# Patient Record
Sex: MALE | Race: BLACK OR AFRICAN AMERICAN | NOT HISPANIC OR LATINO | Employment: OTHER | URBAN - METROPOLITAN AREA
[De-identification: names, ages, dates, MRNs, and addresses within clinical notes are randomized per-mention and may not be internally consistent; named-entity substitution may affect disease eponyms.]

---

## 2017-02-03 ENCOUNTER — OFFICE VISIT (OUTPATIENT)
Dept: OPTOMETRY | Facility: CLINIC | Age: 82
End: 2017-02-03
Payer: MEDICARE

## 2017-02-03 DIAGNOSIS — H26.9 CORTICAL CATARACT OF BOTH EYES: ICD-10-CM

## 2017-02-03 DIAGNOSIS — H25.13 NS (NUCLEAR SCLEROSIS), BILATERAL: ICD-10-CM

## 2017-02-03 DIAGNOSIS — H52.4 PRESBYOPIA: ICD-10-CM

## 2017-02-03 DIAGNOSIS — H52.203 ASTIGMATISM, BILATERAL: ICD-10-CM

## 2017-02-03 DIAGNOSIS — I10 HTN (HYPERTENSION), BENIGN: Primary | ICD-10-CM

## 2017-02-03 DIAGNOSIS — H35.363 DRUSEN OF MACULA, BILATERAL: ICD-10-CM

## 2017-02-03 PROCEDURE — 99999 PR PBB SHADOW E&M-EST. PATIENT-LVL II: CPT | Mod: PBBFAC,,, | Performed by: OPTOMETRIST

## 2017-02-03 PROCEDURE — 92014 COMPRE OPH EXAM EST PT 1/>: CPT | Mod: S$GLB,,, | Performed by: OPTOMETRIST

## 2017-02-03 PROCEDURE — 99499 UNLISTED E&M SERVICE: CPT | Mod: S$GLB,,, | Performed by: OPTOMETRIST

## 2017-02-03 RX ORDER — SIMVASTATIN 40 MG/1
1 TABLET, FILM COATED ORAL DAILY
COMMUNITY
Start: 2016-12-01 | End: 2020-07-09 | Stop reason: SDUPTHER

## 2017-02-03 NOTE — PROGRESS NOTES
HPI     82 yr old here for a full eye exam; history of HTN.  Mr. Saravia reports   that his BP has been stable and controled with medication.  Glasses wear   full time, no noticed vision changes noticed.  RX is from last exam.    No headache  +diplopia every now and then, states that the images are up and down,   lasting a few seconds.    No itch tear or burn  No eye drops.        Last edited by Doris Maher on 2/3/2017 10:37 AM.         Assessment /Plan     For exam results, see Encounter Report.      HTN (hypertension), benign  No retinopathy, monitor      NS (nuclear sclerosis), bilateral  Cortical cataract of both eyes  Mild, monitor     Presbyopia  Astigmatism, bilateral  Rx specs     Drusen of macula, bilateral  Monitor     RTC 1 year, sooner PRN

## 2019-05-01 ENCOUNTER — OFFICE VISIT (OUTPATIENT)
Dept: OPTOMETRY | Facility: CLINIC | Age: 84
End: 2019-05-01
Payer: MEDICARE

## 2019-05-01 DIAGNOSIS — H25.13 NS (NUCLEAR SCLEROSIS), BILATERAL: Primary | ICD-10-CM

## 2019-05-01 DIAGNOSIS — H52.4 PRESBYOPIA: ICD-10-CM

## 2019-05-01 DIAGNOSIS — I10 HYPERTENSION, UNSPECIFIED TYPE: ICD-10-CM

## 2019-05-01 DIAGNOSIS — H35.363 DRUSEN (DEGENERATIVE) OF MACULA, BILATERAL: ICD-10-CM

## 2019-05-01 PROCEDURE — 92014 COMPRE OPH EXAM EST PT 1/>: CPT | Mod: HCWC,S$GLB,, | Performed by: OPTOMETRIST

## 2019-05-01 PROCEDURE — 92015 DETERMINE REFRACTIVE STATE: CPT | Mod: HCWC,S$GLB,, | Performed by: OPTOMETRIST

## 2019-05-01 PROCEDURE — 92015 PR REFRACTION: ICD-10-PCS | Mod: HCWC,S$GLB,, | Performed by: OPTOMETRIST

## 2019-05-01 PROCEDURE — 99999 PR PBB SHADOW E&M-EST. PATIENT-LVL II: CPT | Mod: PBBFAC,HCWC,, | Performed by: OPTOMETRIST

## 2019-05-01 PROCEDURE — 99999 PR PBB SHADOW E&M-EST. PATIENT-LVL II: ICD-10-PCS | Mod: PBBFAC,HCWC,, | Performed by: OPTOMETRIST

## 2019-05-01 PROCEDURE — 92014 PR EYE EXAM, EST PATIENT,COMPREHESV: ICD-10-PCS | Mod: HCWC,S$GLB,, | Performed by: OPTOMETRIST

## 2019-05-01 RX ORDER — CHOLECALCIFEROL (VITAMIN D3) 25 MCG
1000 TABLET ORAL
COMMUNITY

## 2019-05-01 RX ORDER — OMEGA-3-ACID ETHYL ESTERS 1 G/1
2 CAPSULE, LIQUID FILLED ORAL
COMMUNITY

## 2019-05-02 NOTE — PROGRESS NOTES
HPI     Last eye exam was 2/3/17 with Dr Alvarez.    Patient denies diplopia, headaches, flashes/floaters, pain, and   itching/burning/tearing.    No changes in vision. Says glasses work well.     Using any eye gtts? no        Last edited by Марина Hurt on 5/1/2019 10:54 AM. (History)            Assessment /Plan     For exam results, see Encounter Report.        HTN (hypertension), benign  No retinopathy, monitor      NS (nuclear sclerosis), bilateral  Cortical cataract of both eyes  Mild, monitor     Presbyopia  Astigmatism, bilateral  Rx specs     Drusen of macula, bilateral  Monitor     RTC 1 year, sooner PRN

## 2019-08-02 ENCOUNTER — PES CALL (OUTPATIENT)
Dept: ADMINISTRATIVE | Facility: CLINIC | Age: 84
End: 2019-08-02

## 2019-11-01 DIAGNOSIS — Z86.73 PERSONAL HISTORY OF TRANSIENT CEREBRAL ISCHEMIA: Primary | ICD-10-CM

## 2019-11-04 DIAGNOSIS — Z86.73 HISTORY OF STROKE: Primary | ICD-10-CM

## 2019-11-04 DIAGNOSIS — I63.9 CEREBELLAR INFARCTION: Primary | ICD-10-CM

## 2019-11-04 NOTE — PROGRESS NOTES
Outside order received for 2 D-echo w/ CFD.  Order placed into Epic.  Pt will be contacted to schedule appt.

## 2019-11-05 DIAGNOSIS — Z86.73 HISTORY OF CEREBROVASCULAR ACCIDENT: Primary | ICD-10-CM

## 2019-11-05 DIAGNOSIS — I77.6 VASCULITIS, CNS: ICD-10-CM

## 2019-11-21 ENCOUNTER — HOSPITAL ENCOUNTER (OUTPATIENT)
Dept: RADIOLOGY | Facility: HOSPITAL | Age: 84
Discharge: HOME | End: 2019-11-21
Attending: PSYCHIATRY & NEUROLOGY
Payer: MEDICARE

## 2019-11-21 DIAGNOSIS — I63.9 CEREBELLAR INFARCTION: ICD-10-CM

## 2019-11-21 PROCEDURE — 93886 US TRANSCRAN DOPPLER INTRACRAN ARTR COMP: ICD-10-PCS | Mod: 26,HCWC,, | Performed by: RADIOLOGY

## 2019-11-21 PROCEDURE — 93886 INTRACRANIAL COMPLETE STUDY: CPT | Mod: TC,HCWC

## 2019-11-21 PROCEDURE — 93886 INTRACRANIAL COMPLETE STUDY: CPT | Mod: 26,HCWC,, | Performed by: RADIOLOGY

## 2020-01-28 ENCOUNTER — OFFICE VISIT (OUTPATIENT)
Dept: INTERNAL MEDICINE | Facility: CLINIC | Age: 85
End: 2020-01-28
Payer: MEDICARE

## 2020-01-28 ENCOUNTER — CLINICAL SUPPORT (OUTPATIENT)
Dept: INTERNAL MEDICINE | Facility: CLINIC | Age: 85
End: 2020-01-28
Payer: MEDICARE

## 2020-01-28 VITALS
HEIGHT: 68 IN | HEART RATE: 65 BPM | SYSTOLIC BLOOD PRESSURE: 114 MMHG | BODY MASS INDEX: 23.76 KG/M2 | WEIGHT: 156.75 LBS | OXYGEN SATURATION: 90 % | DIASTOLIC BLOOD PRESSURE: 62 MMHG

## 2020-01-28 DIAGNOSIS — Z86.73 H/O: CVA (CEREBROVASCULAR ACCIDENT): ICD-10-CM

## 2020-01-28 DIAGNOSIS — I69.322 DYSARTHRIA FOLLOWING CEREBRAL INFARCTION: ICD-10-CM

## 2020-01-28 DIAGNOSIS — M81.0 AGE-RELATED OSTEOPOROSIS WITHOUT CURRENT PATHOLOGICAL FRACTURE: ICD-10-CM

## 2020-01-28 DIAGNOSIS — J43.8 OTHER EMPHYSEMA: ICD-10-CM

## 2020-01-28 DIAGNOSIS — N40.0 BENIGN PROSTATIC HYPERPLASIA WITHOUT LOWER URINARY TRACT SYMPTOMS: ICD-10-CM

## 2020-01-28 DIAGNOSIS — Z76.89 ENCOUNTER TO ESTABLISH CARE WITH NEW DOCTOR: Primary | ICD-10-CM

## 2020-01-28 DIAGNOSIS — Z87.891 FORMER TOBACCO USE: ICD-10-CM

## 2020-01-28 DIAGNOSIS — Z86.73 HISTORY OF CVA (CEREBROVASCULAR ACCIDENT): ICD-10-CM

## 2020-01-28 PROBLEM — N18.30 CHRONIC RENAL DISEASE, STAGE III: Status: ACTIVE | Noted: 2019-07-29

## 2020-01-28 PROBLEM — N28.1 ACQUIRED CYST OF KIDNEY: Status: ACTIVE | Noted: 2019-08-05

## 2020-01-28 PROBLEM — I69.391 DYSPHAGIA FOLLOWING CEREBROVASCULAR ACCIDENT: Status: RESOLVED | Noted: 2019-08-29 | Resolved: 2020-01-28

## 2020-01-28 PROBLEM — I12.9 RENAL HYPERTENSION: Status: ACTIVE | Noted: 2019-07-29

## 2020-01-28 PROBLEM — R26.89 GAIT, ANTALGIC: Status: ACTIVE | Noted: 2019-10-03

## 2020-01-28 PROBLEM — K21.9 GASTROESOPHAGEAL REFLUX DISEASE WITHOUT ESOPHAGITIS: Status: ACTIVE | Noted: 2019-08-05

## 2020-01-28 PROBLEM — R80.1 PERSISTENT PROTEINURIA: Status: ACTIVE | Noted: 2019-07-29

## 2020-01-28 PROBLEM — I69.391 DYSPHAGIA FOLLOWING CEREBROVASCULAR ACCIDENT: Status: ACTIVE | Noted: 2019-08-29

## 2020-01-28 PROCEDURE — 99499 UNLISTED E&M SERVICE: CPT | Mod: HCNC,S$GLB,, | Performed by: FAMILY MEDICINE

## 2020-01-28 PROCEDURE — 99387 INIT PM E/M NEW PAT 65+ YRS: CPT | Mod: 25,HCNC,S$GLB, | Performed by: FAMILY MEDICINE

## 2020-01-28 PROCEDURE — 99499 RISK ADDL DX/OHS AUDIT: ICD-10-PCS | Mod: HCNC,S$GLB,, | Performed by: FAMILY MEDICINE

## 2020-01-28 PROCEDURE — 3078F DIAST BP <80 MM HG: CPT | Mod: HCNC,CPTII,S$GLB, | Performed by: FAMILY MEDICINE

## 2020-01-28 PROCEDURE — G0008 FLU VACCINE - HIGH DOSE (65+) PRESERVATIVE FREE IM: ICD-10-PCS | Mod: HCNC,S$GLB,, | Performed by: FAMILY MEDICINE

## 2020-01-28 PROCEDURE — 99999 PR PBB SHADOW E&M-EST. PATIENT-LVL IV: CPT | Mod: PBBFAC,HCNC,, | Performed by: FAMILY MEDICINE

## 2020-01-28 PROCEDURE — 3074F SYST BP LT 130 MM HG: CPT | Mod: HCNC,CPTII,S$GLB, | Performed by: FAMILY MEDICINE

## 2020-01-28 PROCEDURE — G0008 ADMIN INFLUENZA VIRUS VAC: HCPCS | Mod: HCNC,S$GLB,, | Performed by: FAMILY MEDICINE

## 2020-01-28 PROCEDURE — 99387 PR PREVENTIVE VISIT,NEW,65 & OVER: ICD-10-PCS | Mod: 25,HCNC,S$GLB, | Performed by: FAMILY MEDICINE

## 2020-01-28 PROCEDURE — 3074F PR MOST RECENT SYSTOLIC BLOOD PRESSURE < 130 MM HG: ICD-10-PCS | Mod: HCNC,CPTII,S$GLB, | Performed by: FAMILY MEDICINE

## 2020-01-28 PROCEDURE — 99999 PR PBB SHADOW E&M-EST. PATIENT-LVL IV: ICD-10-PCS | Mod: PBBFAC,HCNC,, | Performed by: FAMILY MEDICINE

## 2020-01-28 PROCEDURE — 3078F PR MOST RECENT DIASTOLIC BLOOD PRESSURE < 80 MM HG: ICD-10-PCS | Mod: HCNC,CPTII,S$GLB, | Performed by: FAMILY MEDICINE

## 2020-01-28 PROCEDURE — 90662 IIV NO PRSV INCREASED AG IM: CPT | Mod: HCNC,S$GLB,, | Performed by: FAMILY MEDICINE

## 2020-01-28 PROCEDURE — 90662 FLU VACCINE - HIGH DOSE (65+) PRESERVATIVE FREE IM: ICD-10-PCS | Mod: HCNC,S$GLB,, | Performed by: FAMILY MEDICINE

## 2020-01-28 PROCEDURE — 99999 PR PBB SHADOW E&M-EST. PATIENT-LVL I: CPT | Mod: PBBFAC,HCNC,,

## 2020-01-28 PROCEDURE — 99999 PR PBB SHADOW E&M-EST. PATIENT-LVL I: ICD-10-PCS | Mod: PBBFAC,HCNC,,

## 2020-01-28 RX ORDER — ALENDRONATE SODIUM 70 MG/1
70 TABLET ORAL
COMMUNITY
Start: 2019-08-05 | End: 2021-05-10

## 2020-01-28 NOTE — PROGRESS NOTES
Subjective:       Patient ID: Dwayne Saravia is a 85 y.o. male.    Chief Complaint: Establish Care    HPI    Here with wife (wife is patient of Dr. Carrizales and wanted patient to establish with [facility] MDs)    85yoM with PMHx H/o CVA with residual left sided weakness, aphasia, HTN  to [facility] care. Prior PCP Dr. Go Guallpa    Today: Doing relatively well. No complaints. Quit smoking ~2yrs ago and states that breathing has improved significantly. Dealt with chronic bronchitis prior to quitting. Strength is decreased on left side ever since stroke as well as speech has been affected. Able to speak but just slower. No acute changes. No problems swallowing. Normal urine and bowel habits.    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 No issues with urine since. Requesting records.    #Social:  - Tobacco use--> quit smoking ~2018    Review of Systems   Constitutional: Negative for fatigue and fever.   HENT: Negative for ear pain, sinus pain and sore throat.    Respiratory: Negative for cough and shortness of breath.    Cardiovascular: Negative for chest pain and palpitations.   Gastrointestinal: Negative for abdominal pain, constipation, diarrhea, nausea and vomiting.   Genitourinary: Negative for dysuria and hematuria.   Musculoskeletal: Negative for back pain.   Skin: Negative for rash.   Neurological: Negative for weakness, numbness and headaches.         Past Medical History:   Diagnosis Date    BPH (benign prostatic hyperplasia)     CVA (cerebral vascular accident) 2007    Hypertension         Prior to Admission medications    Medication Sig Start  "Date End Date Taking? Authorizing Provider   ascorbic acid (VITAMIN C) 500 MG tablet Take 500 mg by mouth once daily.    Historical Provider, MD   aspirin 81 MG Chew Take 81 mg by mouth once daily.    Historical Provider, MD   benazepril (LOTENSIN) 20 MG tablet Take 20 mg by mouth once daily.    Historical Provider, MD   calcium-vitamin D 250-100 mg-unit per tablet Take 1 tablet by mouth.    Historical Provider, MD   losartan-hydrochlorothiazide 100-12.5 mg (HYZAAR) 100-12.5 mg Tab Take 1 tablet by mouth once daily.    Historical Provider, MD   metoprolol succinate (TOPROL-XL) 25 MG 24 hr tablet Take 25 mg by mouth once daily.    Historical Provider, MD   nifedipine (PROCARDIA-XL) 60 MG (OSM) 24 hr tablet Take 60 mg by mouth once daily.    Historical Provider, MD   omega-3 acid ethyl esters (LOVAZA) 1 gram capsule Take 2 g by mouth.    Historical Provider, MD   omeprazole (PRILOSEC) 20 MG capsule Take 20 mg by mouth once daily.    Historical Provider, MD   simvastatin (ZOCOR) 40 MG tablet Take 1 tablet by mouth once daily. 12/1/16   Historical Provider, MD   vitamin D (VITAMIN D3) 1000 units Tab Take 1,000 Units by mouth.    Historical Provider, MD        Past medical history, surgical history, and family medical history reviewed and updated as appropriate.    Medications and allergies reviewed.     Objective:          Vitals:    01/28/20 0929   BP: 114/62   BP Location: Right arm   Patient Position: Sitting   BP Method: Large (Manual)   Pulse: 65   SpO2: (!) 90%   Weight: 71.1 kg (156 lb 12 oz)   Height: 5' 8" (1.727 m)     Body mass index is 23.83 kg/m².  Physical Exam   Constitutional: He is oriented to person, place, and time. He appears well-developed and well-nourished. No distress.   Eyes: Pupils are equal, round, and reactive to light. EOM are normal.   Cardiovascular: Normal rate, regular rhythm and normal heart sounds.   No murmur heard.  Pulmonary/Chest: Effort normal and breath sounds normal. No " respiratory distress. He has no wheezes.   Abdominal: Soft. Bowel sounds are normal. He exhibits no distension. There is no tenderness.   Neurological: He is alert and oriented to person, place, and time.   Psychiatric: He has a normal mood and affect. His behavior is normal.       Lab Results   Component Value Date    WBC 5.61 02/02/2011    HGB 12.4 (L) 02/02/2011    HCT 37.0 (L) 02/02/2011     02/02/2011    ALT 10 02/02/2011    AST 14 02/02/2011     02/02/2011    K 4.5 02/02/2011     02/02/2011    CREATININE 1.0 02/02/2011    BUN 16 02/02/2011    CO2 23 02/02/2011    PSA 22 (H) 11/19/2008    INR 1.0 02/01/2011       Assessment:       1. Encounter to establish care with new doctor    2. History of CVA (cerebrovascular accident)    3. Other emphysema    4. Benign prostatic hyperplasia without lower urinary tract symptoms    5. Dysarthria following cerebral infarction    6. H/O: CVA (cerebrovascular accident)    7. Age-related osteoporosis without current pathological fracture        Plan:   Dwayne was seen today for establish care.    Diagnoses and all orders for this visit:    Requesting records today from previous PCP. meds reconciled. Neuro referral placed to get established with [facility] specialist.    Encounter to establish care with new doctor    History of CVA (cerebrovascular accident)  -     Ambulatory Referral to Neurology    Other emphysema  Breathing has improved significantly since quitting smoking ~2yrs ago. Encouraged to continue with cessation.     Benign prostatic hyperplasia without lower urinary tract symptoms  Stable today, no urine issues    Dysarthria following cerebral infarction  No acute changes    H/O: CVA (cerebrovascular accident)    Age-related osteoporosis without current pathological fracture  Cont current regimen.      Health maintenance reviewed with patient.     Follow up in about 4 months (around 5/28/2020).    William Mabry MD  Family Medicine  [facility]  1/28/2020

## 2020-04-27 ENCOUNTER — PATIENT OUTREACH (OUTPATIENT)
Dept: ADMINISTRATIVE | Facility: OTHER | Age: 85
End: 2020-04-27

## 2020-04-28 ENCOUNTER — TELEPHONE (OUTPATIENT)
Dept: NEUROLOGY | Facility: CLINIC | Age: 85
End: 2020-04-28

## 2020-04-28 NOTE — TELEPHONE ENCOUNTER
Moved appt to a later date, mailed appt slip.        ----- Message from Nasra Cummings sent at 4/27/2020 10:54 AM CDT -----  Contact: pt  Pt would like to be called back regarding  Getting his appt r/s    Pt can be reached at [phone]

## 2020-05-28 ENCOUNTER — OFFICE VISIT (OUTPATIENT)
Dept: INTERNAL MEDICINE | Facility: CLINIC | Age: 85
End: 2020-05-28
Payer: MEDICARE

## 2020-05-28 ENCOUNTER — IMMUNIZATION (OUTPATIENT)
Dept: PHARMACY | Facility: CLINIC | Age: 85
End: 2020-05-28
Payer: MEDICARE

## 2020-05-28 VITALS
BODY MASS INDEX: 23.76 KG/M2 | SYSTOLIC BLOOD PRESSURE: 126 MMHG | OXYGEN SATURATION: 94 % | HEART RATE: 72 BPM | WEIGHT: 156.75 LBS | HEIGHT: 68 IN | DIASTOLIC BLOOD PRESSURE: 64 MMHG

## 2020-05-28 DIAGNOSIS — Z86.73 H/O: CVA (CEREBROVASCULAR ACCIDENT): ICD-10-CM

## 2020-05-28 DIAGNOSIS — M81.0 AGE-RELATED OSTEOPOROSIS WITHOUT CURRENT PATHOLOGICAL FRACTURE: ICD-10-CM

## 2020-05-28 DIAGNOSIS — Z87.891 FORMER TOBACCO USE: ICD-10-CM

## 2020-05-28 DIAGNOSIS — J43.8 OTHER EMPHYSEMA: ICD-10-CM

## 2020-05-28 DIAGNOSIS — I12.9 RENAL HYPERTENSION: Primary | ICD-10-CM

## 2020-05-28 PROCEDURE — 3078F DIAST BP <80 MM HG: CPT | Mod: HCNC,CPTII,S$GLB, | Performed by: FAMILY MEDICINE

## 2020-05-28 PROCEDURE — 1159F PR MEDICATION LIST DOCUMENTED IN MEDICAL RECORD: ICD-10-PCS | Mod: HCNC,S$GLB,, | Performed by: FAMILY MEDICINE

## 2020-05-28 PROCEDURE — 3078F PR MOST RECENT DIASTOLIC BLOOD PRESSURE < 80 MM HG: ICD-10-PCS | Mod: HCNC,CPTII,S$GLB, | Performed by: FAMILY MEDICINE

## 2020-05-28 PROCEDURE — 99999 PR PBB SHADOW E&M-EST. PATIENT-LVL III: ICD-10-PCS | Mod: PBBFAC,HCNC,, | Performed by: FAMILY MEDICINE

## 2020-05-28 PROCEDURE — 99214 OFFICE O/P EST MOD 30 MIN: CPT | Mod: HCNC,S$GLB,, | Performed by: FAMILY MEDICINE

## 2020-05-28 PROCEDURE — 1159F MED LIST DOCD IN RCRD: CPT | Mod: HCNC,S$GLB,, | Performed by: FAMILY MEDICINE

## 2020-05-28 PROCEDURE — 1101F PR PT FALLS ASSESS DOC 0-1 FALLS W/OUT INJ PAST YR: ICD-10-PCS | Mod: HCNC,CPTII,S$GLB, | Performed by: FAMILY MEDICINE

## 2020-05-28 PROCEDURE — 1126F PR PAIN SEVERITY QUANTIFIED, NO PAIN PRESENT: ICD-10-PCS | Mod: HCNC,S$GLB,, | Performed by: FAMILY MEDICINE

## 2020-05-28 PROCEDURE — 99214 PR OFFICE/OUTPT VISIT, EST, LEVL IV, 30-39 MIN: ICD-10-PCS | Mod: HCNC,S$GLB,, | Performed by: FAMILY MEDICINE

## 2020-05-28 PROCEDURE — 99999 PR PBB SHADOW E&M-EST. PATIENT-LVL III: CPT | Mod: PBBFAC,HCNC,, | Performed by: FAMILY MEDICINE

## 2020-05-28 PROCEDURE — 1126F AMNT PAIN NOTED NONE PRSNT: CPT | Mod: HCNC,S$GLB,, | Performed by: FAMILY MEDICINE

## 2020-05-28 PROCEDURE — 1101F PT FALLS ASSESS-DOCD LE1/YR: CPT | Mod: HCNC,CPTII,S$GLB, | Performed by: FAMILY MEDICINE

## 2020-05-28 PROCEDURE — 3074F SYST BP LT 130 MM HG: CPT | Mod: HCNC,CPTII,S$GLB, | Performed by: FAMILY MEDICINE

## 2020-05-28 PROCEDURE — 99499 RISK ADDL DX/OHS AUDIT: ICD-10-PCS | Mod: HCNC,S$GLB,, | Performed by: FAMILY MEDICINE

## 2020-05-28 PROCEDURE — 3074F PR MOST RECENT SYSTOLIC BLOOD PRESSURE < 130 MM HG: ICD-10-PCS | Mod: HCNC,CPTII,S$GLB, | Performed by: FAMILY MEDICINE

## 2020-05-28 PROCEDURE — 99499 UNLISTED E&M SERVICE: CPT | Mod: HCNC,S$GLB,, | Performed by: FAMILY MEDICINE

## 2020-05-28 NOTE — PATIENT INSTRUCTIONS
I recommend getting the new shingles vaccine, Shingrix.  This is a 2-dose vaccine that is recommended even for patients who have had the Zostavax shingles vaccine in the past.

## 2020-05-28 NOTE — PROGRESS NOTES
Subjective:       Patient ID: Dwayne Saravia is a 85 y.o. male.    Chief Complaint: Follow-up    HPI    Here with wife (wife is patient of Dr. Carrizales and wanted patient to establish with [facility])     85yoM PMHx H/o CVA with residual left sided weakness, aphasia, HTN for follow up. LOV 1/2020.     Today: Doing relatively well. No complaints. Denies needing refills today. Strength is decreased on left side ever since stroke as well as speech has been affected. Able to speak but just slower. No acute changes. No problems swallowing. Normal urine and bowel habits.     #Cards: HTN, HLD  - current reg: Losartan-HCTZ 100/12.5 qd; Nifedipine 60mg bid; Simvastatin 40mg qd; ASA 81 qd     #Endo: Osteoporosis (dx ?2019)  - current reg: Alendronate 70mg qwk; Vitamin D and Calcium     #Neuro: H/o Mult prior strokes (2007, 2012) with residual lt sided wkness, aphasia  - appt w/ neuro 7/2020 to [facility].      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 No issues with urine since. Requesting records.     #Social:  - Tobacco use--> quit smoking ~2018  - States that breathing has improved significantly. Dealt with chronic bronchitis prior to quitting.     #HM:  - utd pnemococcal vacc per patient report.    Review of Systems   Constitutional: Negative for fatigue and fever.   HENT: Negative for ear pain, sinus pain and sore throat.    Respiratory: Negative for cough and shortness of breath.    Cardiovascular: Negative for chest pain and palpitations.   Gastrointestinal: Negative for abdominal pain, constipation, diarrhea, nausea and vomiting.   Genitourinary: Negative for dysuria and hematuria.   Musculoskeletal: Negative for back pain.   Skin: Negative for rash.   Neurological: Negative for weakness, numbness and headaches.         Past Medical History:   Diagnosis Date    BPH (benign prostatic hyperplasia)     CVA (cerebral vascular accident) 2007    Hypertension         Prior to Admission  "medications    Medication Sig Start Date End Date Taking? Authorizing Provider   alendronate (FOSAMAX) 70 MG tablet Take 70 mg by mouth. 8/5/19 8/4/20  Historical Provider, MD   ascorbic acid (VITAMIN C) 500 MG tablet Take 500 mg by mouth once daily.    Historical Provider, MD   aspirin 81 MG Chew Take 81 mg by mouth once daily.    Historical Provider, MD   calcium-vitamin D 250-100 mg-unit per tablet Take 1 tablet by mouth.    Historical Provider, MD   losartan-hydrochlorothiazide 100-12.5 mg (HYZAAR) 100-12.5 mg Tab Take 1 tablet by mouth once daily.    Historical Provider, MD   nifedipine (PROCARDIA-XL) 60 MG (OSM) 24 hr tablet Take 60 mg by mouth once daily.    Historical Provider, MD   omega-3 acid ethyl esters (LOVAZA) 1 gram capsule Take 2 g by mouth.    Historical Provider, MD   simvastatin (ZOCOR) 40 MG tablet Take 1 tablet by mouth once daily. 12/1/16   Historical Provider, MD   vitamin D (VITAMIN D3) 1000 units Tab Take 1,000 Units by mouth.    Historical Provider, MD        Past medical history, surgical history, and family medical history reviewed and updated as appropriate.    Medications and allergies reviewed.     Objective:          Vitals:    05/28/20 0928   BP: 126/64   BP Location: Right arm   Patient Position: Sitting   BP Method: Medium (Manual)   Pulse: 72   SpO2: (!) 94%   Weight: 71.1 kg (156 lb 12 oz)   Height: 5' 8" (1.727 m)     Body mass index is 23.83 kg/m².  Physical Exam   Constitutional: He is oriented to person, place, and time. He appears well-developed and well-nourished. No distress.   Eyes: EOM are normal.   Cardiovascular: Normal rate, regular rhythm and normal heart sounds.   No murmur heard.  Pulmonary/Chest: Effort normal and breath sounds normal. No respiratory distress. He has no wheezes.   Abdominal: Soft. Bowel sounds are normal. He exhibits no distension. There is no tenderness.   Neurological: He is alert and oriented to person, place, and time.   Psychiatric: He has a " normal mood and affect. His behavior is normal.       Lab Results   Component Value Date    WBC 5.61 02/02/2011    HGB 12.4 (L) 02/02/2011    HCT 37.0 (L) 02/02/2011     02/02/2011    ALT 10 02/02/2011    AST 14 02/02/2011     02/02/2011    K 4.5 02/02/2011     02/02/2011    CREATININE 1.0 02/02/2011    BUN 16 02/02/2011    CO2 23 02/02/2011    PSA 22 (H) 11/19/2008    INR 1.0 02/01/2011       Assessment:       1. Renal hypertension    2. Other emphysema    3. H/O: CVA (cerebrovascular accident)    4. Age-related osteoporosis without current pathological fracture    5. Former tobacco use        Plan:   Dwayne was seen today for follow-up.    Diagnoses and all orders for this visit:    Doing well today, still awaiting records from previous providers, will try requesting again. Will hold off on labs for now but plan to address with labwork next visit regardless of records coming in. Cont to abstain from smoking, cont medications as prescribed. F/u neuro to est care in 1 month    Renal hypertension  at goal.  Continue current medications.    Other emphysema  No acute issues, doing well  H/O: CVA (cerebrovascular accident)  Neuro next month  Age-related osteoporosis without current pathological fracture    Former tobacco use        Health maintenance reviewed with patient. shingrix today    Follow up in about 3 months (around 8/28/2020).    William Mabry MD  Family Medicine  [facility]  5/28/2020

## 2020-07-07 ENCOUNTER — HOSPITAL ENCOUNTER (OUTPATIENT)
Dept: RADIOLOGY | Facility: HOSPITAL | Age: 85
Discharge: HOME | End: 2020-07-07
Attending: NEUROLOGICAL SURGERY
Payer: MEDICARE

## 2020-07-07 ENCOUNTER — OFFICE VISIT (OUTPATIENT)
Dept: NEUROLOGY | Facility: CLINIC | Age: 85
End: 2020-07-07
Payer: MEDICARE

## 2020-07-07 VITALS
BODY MASS INDEX: 23.52 KG/M2 | WEIGHT: 155.19 LBS | DIASTOLIC BLOOD PRESSURE: 78 MMHG | HEART RATE: 73 BPM | SYSTOLIC BLOOD PRESSURE: 127 MMHG | HEIGHT: 68 IN

## 2020-07-07 DIAGNOSIS — Z86.73 H/O: CVA (CEREBROVASCULAR ACCIDENT): Primary | ICD-10-CM

## 2020-07-07 DIAGNOSIS — I69.351 HEMIPARESIS AFFECTING RIGHT SIDE AS LATE EFFECT OF STROKE: ICD-10-CM

## 2020-07-07 DIAGNOSIS — M79.89 FINGER SWELLING: ICD-10-CM

## 2020-07-07 DIAGNOSIS — E78.5 HYPERLIPIDEMIA, UNSPECIFIED HYPERLIPIDEMIA TYPE: ICD-10-CM

## 2020-07-07 DIAGNOSIS — I69.322 DYSARTHRIA FOLLOWING CEREBRAL INFARCTION: ICD-10-CM

## 2020-07-07 PROCEDURE — 3078F DIAST BP <80 MM HG: CPT | Mod: HCNC,CPTII,S$GLB, | Performed by: NEUROLOGICAL SURGERY

## 2020-07-07 PROCEDURE — 99999 PR PBB SHADOW E&M-EST. PATIENT-LVL III: CPT | Mod: PBBFAC,HCNC,, | Performed by: NEUROLOGICAL SURGERY

## 2020-07-07 PROCEDURE — 1125F AMNT PAIN NOTED PAIN PRSNT: CPT | Mod: HCNC,S$GLB,, | Performed by: NEUROLOGICAL SURGERY

## 2020-07-07 PROCEDURE — 1101F PR PT FALLS ASSESS DOC 0-1 FALLS W/OUT INJ PAST YR: ICD-10-PCS | Mod: HCNC,CPTII,S$GLB, | Performed by: NEUROLOGICAL SURGERY

## 2020-07-07 PROCEDURE — 73130 X-RAY EXAM OF HAND: CPT | Mod: TC,HCNC,FY,RT

## 2020-07-07 PROCEDURE — 99204 PR OFFICE/OUTPT VISIT, NEW, LEVL IV, 45-59 MIN: ICD-10-PCS | Mod: HCNC,S$GLB,, | Performed by: NEUROLOGICAL SURGERY

## 2020-07-07 PROCEDURE — 3078F PR MOST RECENT DIASTOLIC BLOOD PRESSURE < 80 MM HG: ICD-10-PCS | Mod: HCNC,CPTII,S$GLB, | Performed by: NEUROLOGICAL SURGERY

## 2020-07-07 PROCEDURE — 3074F SYST BP LT 130 MM HG: CPT | Mod: HCNC,CPTII,S$GLB, | Performed by: NEUROLOGICAL SURGERY

## 2020-07-07 PROCEDURE — 1159F PR MEDICATION LIST DOCUMENTED IN MEDICAL RECORD: ICD-10-PCS | Mod: HCNC,S$GLB,, | Performed by: NEUROLOGICAL SURGERY

## 2020-07-07 PROCEDURE — 3074F PR MOST RECENT SYSTOLIC BLOOD PRESSURE < 130 MM HG: ICD-10-PCS | Mod: HCNC,CPTII,S$GLB, | Performed by: NEUROLOGICAL SURGERY

## 2020-07-07 PROCEDURE — 1159F MED LIST DOCD IN RCRD: CPT | Mod: HCNC,S$GLB,, | Performed by: NEUROLOGICAL SURGERY

## 2020-07-07 PROCEDURE — 99499 RISK ADDL DX/OHS AUDIT: ICD-10-PCS | Mod: S$GLB,,, | Performed by: NEUROLOGICAL SURGERY

## 2020-07-07 PROCEDURE — 99204 OFFICE O/P NEW MOD 45 MIN: CPT | Mod: HCNC,S$GLB,, | Performed by: NEUROLOGICAL SURGERY

## 2020-07-07 PROCEDURE — 99999 PR PBB SHADOW E&M-EST. PATIENT-LVL III: ICD-10-PCS | Mod: PBBFAC,HCNC,, | Performed by: NEUROLOGICAL SURGERY

## 2020-07-07 PROCEDURE — 73130 X-RAY EXAM OF HAND: CPT | Mod: 26,HCNC,RT, | Performed by: RADIOLOGY

## 2020-07-07 PROCEDURE — 1101F PT FALLS ASSESS-DOCD LE1/YR: CPT | Mod: HCNC,CPTII,S$GLB, | Performed by: NEUROLOGICAL SURGERY

## 2020-07-07 PROCEDURE — 99499 UNLISTED E&M SERVICE: CPT | Mod: S$GLB,,, | Performed by: NEUROLOGICAL SURGERY

## 2020-07-07 PROCEDURE — 73130 XR HAND COMPLETE 3 VIEW RIGHT: ICD-10-PCS | Mod: 26,HCNC,RT, | Performed by: RADIOLOGY

## 2020-07-07 PROCEDURE — 1125F PR PAIN SEVERITY QUANTIFIED, PAIN PRESENT: ICD-10-PCS | Mod: HCNC,S$GLB,, | Performed by: NEUROLOGICAL SURGERY

## 2020-07-07 NOTE — LETTER
July 7, 2020      William Mabry MD  [address] [facility] [address]           [facility] [address]  [facility] [address]  Phone: [phone]  Fax: [phone]          Patient: Dwayne Saravia   MR Number: [id number]   YOB: 1934   Date of Visit: 7/7/2020       Dear Dr. William Mabry:    Thank you for referring Dwayne Saravia to me for evaluation. Attached you will find relevant portions of my assessment and plan of care.    If you have questions, please do not hesitate to call me. I look forward to following Dwayne Saravia along with you.    Sincerely,    Manuel Alvarez MD    Enclosure  CC:  No Recipients    If you would like to receive this communication electronically, please contact [email or web address] or [phone] to request more information on [employer] access.    For providers and/or their staff who would like to refer a patient to [facility], please contact us through our one-stop-shop provider referral line, [facility], at [phone].    If you feel you have received this communication in error or would no longer like to receive these types of communications, please e-mail [email or web address]

## 2020-07-07 NOTE — PROGRESS NOTES
Chief Complaint   Patient presents with    Stroke        Dwayne Saravia is a 85 y.o. male with a history of multiple medical diagnoses as listed below that presents for evaluation after having stroke in the past.  The patient is accompanied by his wife who helps to provide the history.  He had stroke in the early s and then had subsequent stroke a few years later.  Since that time he has been having residual left-sided hemiparesis.  He has been having difficulty with his walking in despite being provided assistive devices including a single-point cane, walker, and Rollator the patient says that he has not been using these devices as recommended.  He has had several falls in the last year.  His wife says that she has been encouraging him the use of devices to help with his walking but he has been reluctant to do so.  He has been taking the medication as directed.  Blood pressures been well controlled on his current regimen.  He has no complaints of side effects with his current medication regimen.    PAST MEDICAL HISTORY:  Past Medical History:   Diagnosis Date    BPH (benign prostatic hyperplasia)     CVA (cerebral vascular accident)     Hypertension        PAST SURGICAL HISTORY:  History reviewed. No pertinent surgical history.    SOCIAL HISTORY:  Social History     Socioeconomic History    Marital status:      Spouse name: Not on file    Number of children: Not on file    Years of education: Not on file    Highest education level: Not on file   Occupational History    Not on file   Social Needs    Financial resource strain: Not on file    Food insecurity     Worry: Not on file     Inability: Not on file    Transportation needs     Medical: Not on file     Non-medical: Not on file   Tobacco Use    Smoking status: Former Smoker     Packs/day: 0.25     Start date: 1950     Quit date: 2018     Years since quittin.4    Smokeless tobacco: Never Used   Substance and Sexual  Activity    Alcohol use: No    Drug use: No    Sexual activity: Not on file   Lifestyle    Physical activity     Days per week: Not on file     Minutes per session: Not on file    Stress: Not on file   Relationships    Social connections     Talks on phone: Not on file     Gets together: Not on file     Attends Taoism service: Not on file     Active member of club or organization: Not on file     Attends meetings of clubs or organizations: Not on file     Relationship status: Not on file   Other Topics Concern    Not on file   Social History Narrative    Not on file       FAMILY HISTORY:  Family History   Problem Relation Age of Onset    Hypertension Brother     Cancer Brother        ALLERGIES AND MEDICATIONS: updated and reviewed.  Review of patient's allergies indicates:   Allergen Reactions    Keflex [cephalexin]      Current Outpatient Medications   Medication Sig Dispense Refill    aspirin 81 MG Chew Take 81 mg by mouth once daily.      losartan-hydrochlorothiazide 100-12.5 mg (HYZAAR) 100-12.5 mg Tab Take 1 tablet by mouth once daily.      nifedipine (PROCARDIA-XL) 60 MG (OSM) 24 hr tablet Take 60 mg by mouth once daily.      simvastatin (ZOCOR) 40 MG tablet Take 1 tablet by mouth once daily.      vitamin D (VITAMIN D3) 1000 units Tab Take 1,000 Units by mouth.      alendronate (FOSAMAX) 70 MG tablet Take 70 mg by mouth.      ascorbic acid (VITAMIN C) 500 MG tablet Take 500 mg by mouth once daily.      calcium-vitamin D 250-100 mg-unit per tablet Take 1 tablet by mouth.      omega-3 acid ethyl esters (LOVAZA) 1 gram capsule Take 2 g by mouth.       No current facility-administered medications for this visit.        Review of Systems   Constitutional: Negative for activity change, fatigue and unexpected weight change.   HENT: Negative for trouble swallowing and voice change.    Eyes: Negative for photophobia, pain and visual disturbance.   Respiratory: Negative for apnea and shortness of  breath.    Cardiovascular: Negative for chest pain and palpitations.   Gastrointestinal: Negative for constipation, nausea and vomiting.   Genitourinary: Negative for difficulty urinating.   Musculoskeletal: Negative for arthralgias, back pain, gait problem, myalgias and neck pain.   Skin: Negative for color change and rash.   Neurological: Positive for weakness. Negative for dizziness, seizures, syncope, speech difficulty, light-headedness, numbness and headaches.   Psychiatric/Behavioral: Negative for agitation, behavioral problems and confusion.       Neurologic Exam     Mental Status   Oriented to person, place, and time.   Registration: recalls 3 of 3 objects.   Attention: normal. Concentration: normal.   Speech: speech is normal   Level of consciousness: alert  Knowledge: good.     Cranial Nerves     CN II   Visual fields full to confrontation.   Right visual field deficit: none  Left visual field deficit: none     CN III, IV, VI   Pupils are equal, round, and reactive to light.  Extraocular motions are normal.   Right pupil: Size: 3 mm. Shape: regular. Accommodation: intact.   Left pupil: Size: 3 mm. Shape: regular. Accommodation: intact.   CN III: no CN III palsy  CN VI: no CN VI palsy  Nystagmus: none   Diplopia: none  Ophthalmoparesis: none  Upgaze: normal  Downgaze: normal  Conjugate gaze: present    CN V   Facial sensation intact.   Right facial sensation deficit: none  Left facial sensation deficit: none    CN VII   Facial expression full, symmetric.   Right facial weakness: none  Left facial weakness: none    CN VIII   CN VIII normal.     CN IX, X   CN IX normal.   CN X normal.   Palate: symmetric    CN XI   CN XI normal.   Right sternocleidomastoid strength: normal  Left sternocleidomastoid strength: normal  Right trapezius strength: normal  Left trapezius strength: normal    CN XII   CN XII normal.   Tongue deviation: none    Motor Exam   Muscle bulk: normal  Overall muscle tone: normal  Right arm  tone: normal  Left arm tone: normal  Right leg tone: normal  Left leg tone: normal    Strength   Strength 5/5 except as noted.   Left deltoid: 4/5  Left biceps: 4/5  Left triceps: 4/5  Left quadriceps: 4/5    Sensory Exam   Right arm light touch: normal  Left arm light touch: normal  Right leg light touch: normal  Left leg light touch: normal  Right arm vibration: normal  Left arm vibration: normal  Right leg vibration: normal  Left leg vibration: normal  Right arm proprioception: normal  Left arm proprioception: normal  Right leg proprioception: normal  Left leg proprioception: normal  Right arm pinprick: normal  Left arm pinprick: normal  Right leg pinprick: normal  Left leg pinprick: normal    Gait, Coordination, and Reflexes     Gait  Gait: wide-based    Coordination   Finger to nose coordination: normal  Heel to shin coordination: normal  Tandem walking coordination: normal    Tremor   Resting tremor: absent    Reflexes   Right brachioradialis: 2+  Left brachioradialis: 3+  Right biceps: 2+  Left biceps: 3+  Right triceps: 2+  Left triceps: 3+  Right patellar: 2+  Left patellar: 3+  Right achilles: 2+  Left achilles: 2+  Right plantar: equivocal  Left plantar: equivocal      Physical Exam  Constitutional:       Appearance: He is well-developed.   HENT:      Head: Normocephalic and atraumatic.   Eyes:      Extraocular Movements: EOM normal.      Pupils: Pupils are equal, round, and reactive to light.   Pulmonary:      Effort: Pulmonary effort is normal. No respiratory distress.   Musculoskeletal: Normal range of motion.   Skin:     General: Skin is warm and dry.   Neurological:      Mental Status: He is alert and oriented to person, place, and time.      Coordination: Finger-Nose-Finger Test and Heel to Shin Test normal.      Gait: Tandem walk normal.      Deep Tendon Reflexes:      Reflex Scores:       Tricep reflexes are 2+ on the right side and 3+ on the left side.       Bicep reflexes are 2+ on the right  "side and 3+ on the left side.       Brachioradialis reflexes are 2+ on the right side and 3+ on the left side.       Patellar reflexes are 2+ on the right side and 3+ on the left side.       Achilles reflexes are 2+ on the right side and 2+ on the left side.  Psychiatric:         Speech: Speech normal.         Behavior: Behavior normal.         Vitals:    07/07/20 1041   BP: 127/78   BP Location: Right arm   Patient Position: Sitting   BP Method: Large (Automatic)   Pulse: 73   Weight: 70.4 kg (155 lb 3.3 oz)   Height: 5' 8" (1.727 m)       Assessment & Plan:    Problem List Items Addressed This Visit     H/O: CVA (cerebrovascular accident) - Primary    Overview     3/2008         Relevant Orders    Lipid Panel    Dysarthria following cerebral infarction      Other Visit Diagnoses     Finger swelling        Relevant Orders    X-Ray Hand 3 View Right (Completed)    Hyperlipidemia, unspecified hyperlipidemia type        Relevant Orders    Lipid Panel    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  Follow-up: Follow up in about 3 months (around 10/7/2020).    This note was done with the assistance of voice recognition software. Some errors may be present after proofreading.        "

## 2020-07-09 RX ORDER — SIMVASTATIN 40 MG/1
40 TABLET, FILM COATED ORAL DAILY
Qty: 90 TABLET | Refills: 1 | Status: SHIPPED | OUTPATIENT
Start: 2020-07-09 | End: 2020-11-16

## 2020-07-09 RX ORDER — LOSARTAN POTASSIUM AND HYDROCHLOROTHIAZIDE 12.5; 1 MG/1; MG/1
1 TABLET ORAL DAILY
Qty: 90 TABLET | Refills: 1 | Status: SHIPPED | OUTPATIENT
Start: 2020-07-09 | End: 2020-11-16

## 2020-08-20 ENCOUNTER — OFFICE VISIT (OUTPATIENT)
Dept: INTERNAL MEDICINE | Facility: CLINIC | Age: 85
End: 2020-08-20
Payer: MEDICARE

## 2020-08-20 VITALS
OXYGEN SATURATION: 96 % | HEIGHT: 68 IN | WEIGHT: 156.94 LBS | HEART RATE: 71 BPM | BODY MASS INDEX: 23.79 KG/M2 | SYSTOLIC BLOOD PRESSURE: 110 MMHG | DIASTOLIC BLOOD PRESSURE: 80 MMHG

## 2020-08-20 DIAGNOSIS — N40.0 BENIGN PROSTATIC HYPERPLASIA WITHOUT LOWER URINARY TRACT SYMPTOMS: ICD-10-CM

## 2020-08-20 DIAGNOSIS — Z00.00 ENCOUNTER FOR PREVENTIVE HEALTH EXAMINATION: Primary | ICD-10-CM

## 2020-08-20 DIAGNOSIS — I12.9 RENAL HYPERTENSION: ICD-10-CM

## 2020-08-20 DIAGNOSIS — J43.8 OTHER EMPHYSEMA: ICD-10-CM

## 2020-08-20 DIAGNOSIS — K21.9 GASTROESOPHAGEAL REFLUX DISEASE WITHOUT ESOPHAGITIS: ICD-10-CM

## 2020-08-20 DIAGNOSIS — Z23 NEED FOR SHINGLES VACCINE: ICD-10-CM

## 2020-08-20 DIAGNOSIS — Z23 NEED FOR VACCINATION WITH 13-POLYVALENT PNEUMOCOCCAL CONJUGATE VACCINE: ICD-10-CM

## 2020-08-20 DIAGNOSIS — N18.30 CHRONIC RENAL DISEASE, STAGE III: ICD-10-CM

## 2020-08-20 DIAGNOSIS — I69.322 DYSARTHRIA FOLLOWING CEREBRAL INFARCTION: ICD-10-CM

## 2020-08-20 DIAGNOSIS — Z86.73 H/O: CVA (CEREBROVASCULAR ACCIDENT): ICD-10-CM

## 2020-08-20 DIAGNOSIS — M81.0 AGE-RELATED OSTEOPOROSIS WITHOUT CURRENT PATHOLOGICAL FRACTURE: ICD-10-CM

## 2020-08-20 PROCEDURE — G0009 ADMIN PNEUMOCOCCAL VACCINE: HCPCS | Mod: S$GLB,,, | Performed by: NURSE PRACTITIONER

## 2020-08-20 PROCEDURE — 3074F PR MOST RECENT SYSTOLIC BLOOD PRESSURE < 130 MM HG: ICD-10-PCS | Mod: CPTII,S$GLB,, | Performed by: NURSE PRACTITIONER

## 2020-08-20 PROCEDURE — G0439 PPPS, SUBSEQ VISIT: HCPCS | Mod: S$GLB,,, | Performed by: NURSE PRACTITIONER

## 2020-08-20 PROCEDURE — G0439 PR MEDICARE ANNUAL WELLNESS SUBSEQUENT VISIT: ICD-10-PCS | Mod: S$GLB,,, | Performed by: NURSE PRACTITIONER

## 2020-08-20 PROCEDURE — 90670 PCV13 VACCINE IM: CPT | Mod: S$GLB,,, | Performed by: NURSE PRACTITIONER

## 2020-08-20 PROCEDURE — 3079F DIAST BP 80-89 MM HG: CPT | Mod: CPTII,S$GLB,, | Performed by: NURSE PRACTITIONER

## 2020-08-20 PROCEDURE — 3074F SYST BP LT 130 MM HG: CPT | Mod: CPTII,S$GLB,, | Performed by: NURSE PRACTITIONER

## 2020-08-20 PROCEDURE — G0009 PNEUMOCOCCAL CONJUGATE VACCINE 13-VALENT LESS THAN 5YO & GREATER THAN: ICD-10-PCS | Mod: S$GLB,,, | Performed by: NURSE PRACTITIONER

## 2020-08-20 PROCEDURE — 99999 PR PBB SHADOW E&M-EST. PATIENT-LVL III: ICD-10-PCS | Mod: PBBFAC,,, | Performed by: NURSE PRACTITIONER

## 2020-08-20 PROCEDURE — 90670 PNEUMOCOCCAL CONJUGATE VACCINE 13-VALENT LESS THAN 5YO & GREATER THAN: ICD-10-PCS | Mod: S$GLB,,, | Performed by: NURSE PRACTITIONER

## 2020-08-20 PROCEDURE — 99999 PR PBB SHADOW E&M-EST. PATIENT-LVL III: CPT | Mod: PBBFAC,,, | Performed by: NURSE PRACTITIONER

## 2020-08-20 PROCEDURE — 3079F PR MOST RECENT DIASTOLIC BLOOD PRESSURE 80-89 MM HG: ICD-10-PCS | Mod: CPTII,S$GLB,, | Performed by: NURSE PRACTITIONER

## 2020-08-20 NOTE — PROGRESS NOTES
"  Dwayne Saravia presented for a  Medicare AWV and comprehensive Health Risk Assessment today. The following components were reviewed and updated:    · Medical history  · Family History  · Social history  · Allergies and Current Medications  · Health Risk Assessment  · Health Maintenance  · Care Team     ** See Completed Assessments for Annual Wellness Visit within the encounter summary.**       The following assessments were completed:  · Living Situation  · CAGE  · Depression Screening  · Timed Get Up and Go--not done, walks with a cane  · Whisper Test  · Cognitive Function Screening        · Nutrition Screening  · ADL Screening  · PAQ Screening    Vitals:    08/20/20 0907   BP: 110/80   BP Location: Left arm   Patient Position: Sitting   BP Method: Medium (Manual)   Pulse: 71   SpO2: 96%   Weight: 71.2 kg (156 lb 15.5 oz)   Height: 5' 8" (1.727 m)     Body mass index is 23.87 kg/m².  Physical Exam  Vitals signs and nursing note reviewed.   Constitutional:       Appearance: Normal appearance. He is well-developed.   HENT:      Head: Normocephalic.      Nose: Nose normal.      Mouth/Throat:      Mouth: Mucous membranes are moist.      Pharynx: Oropharynx is clear.   Eyes:      General: Lids are normal. Lids are everted, no foreign bodies appreciated.      Extraocular Movements: Extraocular movements intact.      Conjunctiva/sclera: Conjunctivae normal.      Pupils: Pupils are equal, round, and reactive to light.   Neck:      Musculoskeletal: Full passive range of motion without pain, normal range of motion and neck supple.      Vascular: No carotid bruit or JVD.      Trachea: Trachea normal.   Cardiovascular:      Rate and Rhythm: Normal rate and regular rhythm.      Pulses: Normal pulses.      Heart sounds: Normal heart sounds.   Pulmonary:      Effort: Pulmonary effort is normal.      Breath sounds: Normal breath sounds.   Abdominal:      General: Abdomen is flat. Bowel sounds are normal.      Palpations: " Abdomen is soft.   Musculoskeletal:      Comments: Walks with a cane   Skin:     General: Skin is warm and dry.      Capillary Refill: Capillary refill takes less than 2 seconds.   Neurological:      General: No focal deficit present.      Mental Status: He is alert and oriented to person, place, and time.   Psychiatric:         Mood and Affect: Mood normal.         Speech: Speech normal.         Behavior: Behavior normal.         Thought Content: Thought content normal.         Judgment: Judgment normal.         Diagnoses and health risks identified today and associated recommendations/orders:    1. Encounter for preventive health examination  Exam done    Health Maintenance updated    Records reviewed    2. Other emphysema  Stable, followed by PCP    3. Dysarthria following cerebral infarction  Chronic, followed by PCP    4. H/O: CVA (cerebrovascular accident)  Resolved, stable    5. Renal hypertension  Chronic, followed by PCP    6. Chronic renal disease, stage III  Chronic, followed by PCP    Goal BP < 140/80, LDL goal < 100, low salt diet, avoid otc NSAIDs.    7. Benign prostatic hyperplasia without lower urinary tract symptoms  Stable, followed by PCP    8. Gastroesophageal reflux disease without esophagitis  Stable, followed by PCP    GERD stable on meds, education on dietary triggers done    9. Age-related osteoporosis without current pathological fracture  Chronic, followed by PCP    10. Need for shingles vaccine  - zoster vaccine live, PF, (ZOSTAVAX, PF,) 19,400 unit/0.65 mL injection; Inject 19,400 Units into the skin once. for 1 dose  Dispense: 1 vial; Refill: 0    11. Need for vaccination with 13-polyvalent pneumococcal conjugate vaccine  - (In Office Administered) Pneumococcal Conjugate Vaccine (13 Valent) (IM)    12. BMI 23.0-23.9, adult  BMI reviewed    Provided Dwayne with a 5-10 year written screening schedule and personal prevention plan. Recommendations were developed using the USPSTF age  appropriate recommendations. Education, counseling, and referrals were provided as needed. After Visit Summary printed and given to patient which includes a list of additional screenings\tests needed.    Follow up in about 8 days (around 8/28/2020) for with PCP Dr. Mabry as scheduled.    Janet Arellano, [place]  I offered to discuss end of life issues, including information on how to make advance directives that the patient could use to name someone who would make medical decisions on their behalf if they became too ill to make themselves.    ___Patient declined  _X_Patient is interested, I provided paper work and offered to discuss.

## 2020-08-20 NOTE — PATIENT INSTRUCTIONS
Counseling and Referral of Other Preventative  (Italic type indicates deductible and co-insurance are waived)    Patient Name: Dwayne Saravia  Today's Date: 8/20/2020    Health Maintenance       Date Due Completion Date    Pneumococcal Vaccine (65+ Low/Medium Risk) (2 of 2 - PCV13) 08/29/2015--ordered today 8/29/2014    Shingles Vaccine (3 of 3) 07/23/2020--sent to your pharmacy 5/28/2020    TETANUS VACCINE 08/20/2021 (Originally 11/22/1952) ---Insurance does not cover Tetanus vaccine if given through our clinic, however you may check with your local pharmacy to run vaccine and it may be covered if given there      Influenza Vaccine (1) 09/01/2020 1/28/2020    Aspirin/Antiplatelet Therapy 07/07/2021 7/7/2020    Lipid Panel 07/22/2024 7/22/2019        No orders of the defined types were placed in this encounter.    The following information is provided to all patients.  This information is to help you find resources for any of the problems found today that may be affecting your health:                Living healthy guide: www.[facility].[place].gov      Understanding Diabetes: www.diabetes.org      Eating healthy: www.cdc.gov/healthyweight      CDC home safety checklist: www.cdc.gov/steadi/patient.html      Agency on Aging: www.goea.[place].gov      Alcoholics anonymous (AA): www.aa.org      Physical Activity: www.melissa.nih.gov/fx2zsqx      Tobacco use: www.quitwithusla.org

## 2020-08-28 ENCOUNTER — LAB VISIT (OUTPATIENT)
Dept: LAB | Facility: HOSPITAL | Age: 85
End: 2020-08-28
Attending: FAMILY MEDICINE
Payer: MEDICARE

## 2020-08-28 ENCOUNTER — OFFICE VISIT (OUTPATIENT)
Dept: INTERNAL MEDICINE | Facility: CLINIC | Age: 85
End: 2020-08-28
Payer: MEDICARE

## 2020-08-28 ENCOUNTER — IMMUNIZATION (OUTPATIENT)
Dept: PHARMACY | Facility: CLINIC | Age: 85
End: 2020-08-28
Payer: MEDICARE

## 2020-08-28 VITALS
SYSTOLIC BLOOD PRESSURE: 130 MMHG | OXYGEN SATURATION: 98 % | HEIGHT: 68 IN | BODY MASS INDEX: 23.43 KG/M2 | DIASTOLIC BLOOD PRESSURE: 70 MMHG | HEART RATE: 63 BPM | WEIGHT: 154.56 LBS

## 2020-08-28 DIAGNOSIS — Z86.73 H/O: CVA (CEREBROVASCULAR ACCIDENT): ICD-10-CM

## 2020-08-28 DIAGNOSIS — I12.9 RENAL HYPERTENSION: ICD-10-CM

## 2020-08-28 DIAGNOSIS — N18.30 CHRONIC RENAL DISEASE, STAGE III: ICD-10-CM

## 2020-08-28 DIAGNOSIS — Z87.891 FORMER TOBACCO USE: ICD-10-CM

## 2020-08-28 DIAGNOSIS — M81.0 AGE-RELATED OSTEOPOROSIS WITHOUT CURRENT PATHOLOGICAL FRACTURE: ICD-10-CM

## 2020-08-28 DIAGNOSIS — Z79.899 ENCOUNTER FOR LONG-TERM (CURRENT) USE OF OTHER MEDICATIONS: ICD-10-CM

## 2020-08-28 DIAGNOSIS — G89.29 CHRONIC PAIN OF RIGHT KNEE: ICD-10-CM

## 2020-08-28 DIAGNOSIS — M25.561 CHRONIC PAIN OF RIGHT KNEE: ICD-10-CM

## 2020-08-28 DIAGNOSIS — Z79.899 ENCOUNTER FOR LONG-TERM (CURRENT) USE OF OTHER MEDICATIONS: Primary | ICD-10-CM

## 2020-08-28 LAB
ALBUMIN SERPL BCP-MCNC: 3.8 G/DL (ref 3.5–5.2)
ALP SERPL-CCNC: 90 U/L (ref 55–135)
ALT SERPL W/O P-5'-P-CCNC: 12 U/L (ref 10–44)
ANION GAP SERPL CALC-SCNC: 10 MMOL/L (ref 8–16)
AST SERPL-CCNC: 19 U/L (ref 10–40)
BASOPHILS # BLD AUTO: 0.02 K/UL (ref 0–0.2)
BASOPHILS NFR BLD: 0.4 % (ref 0–1.9)
BILIRUB SERPL-MCNC: 0.4 MG/DL (ref 0.1–1)
BUN SERPL-MCNC: 28 MG/DL (ref 8–23)
BURR CELLS BLD QL SMEAR: ABNORMAL
CALCIUM SERPL-MCNC: 9.4 MG/DL (ref 8.7–10.5)
CHLORIDE SERPL-SCNC: 108 MMOL/L (ref 95–110)
CHOLEST SERPL-MCNC: 116 MG/DL (ref 120–199)
CHOLEST/HDLC SERPL: 2.8 {RATIO} (ref 2–5)
CO2 SERPL-SCNC: 24 MMOL/L (ref 23–29)
CREAT SERPL-MCNC: 1.5 MG/DL (ref 0.5–1.4)
DIFFERENTIAL METHOD: ABNORMAL
EOSINOPHIL # BLD AUTO: 0 K/UL (ref 0–0.5)
EOSINOPHIL NFR BLD: 0.4 % (ref 0–8)
ERYTHROCYTE [DISTWIDTH] IN BLOOD BY AUTOMATED COUNT: 14.2 % (ref 11.5–14.5)
EST. GFR  (AFRICAN AMERICAN): 48.4 ML/MIN/1.73 M^2
EST. GFR  (NON AFRICAN AMERICAN): 41.8 ML/MIN/1.73 M^2
ESTIMATED AVG GLUCOSE: 128 MG/DL (ref 68–131)
GLUCOSE SERPL-MCNC: 86 MG/DL (ref 70–110)
HBA1C MFR BLD HPLC: 6.1 % (ref 4–5.6)
HCT VFR BLD AUTO: 42.8 % (ref 40–54)
HDLC SERPL-MCNC: 41 MG/DL (ref 40–75)
HDLC SERPL: 35.3 % (ref 20–50)
HGB BLD-MCNC: 14.1 G/DL (ref 14–18)
IMM GRANULOCYTES # BLD AUTO: 0.03 K/UL (ref 0–0.04)
IMM GRANULOCYTES NFR BLD AUTO: 0.6 % (ref 0–0.5)
LDLC SERPL CALC-MCNC: 63.2 MG/DL (ref 63–159)
LYMPHOCYTES # BLD AUTO: 1.5 K/UL (ref 1–4.8)
LYMPHOCYTES NFR BLD: 29.8 % (ref 18–48)
MCH RBC QN AUTO: 31.9 PG (ref 27–31)
MCHC RBC AUTO-ENTMCNC: 32.9 G/DL (ref 32–36)
MCV RBC AUTO: 97 FL (ref 82–98)
MONOCYTES # BLD AUTO: 0.6 K/UL (ref 0.3–1)
MONOCYTES NFR BLD: 12.8 % (ref 4–15)
NEUTROPHILS # BLD AUTO: 2.7 K/UL (ref 1.8–7.7)
NEUTROPHILS NFR BLD: 56 % (ref 38–73)
NONHDLC SERPL-MCNC: 75 MG/DL
NRBC BLD-RTO: 0 /100 WBC
OVALOCYTES BLD QL SMEAR: ABNORMAL
PLATELET # BLD AUTO: 264 K/UL (ref 150–350)
PLATELET BLD QL SMEAR: ABNORMAL
PMV BLD AUTO: 10.8 FL (ref 9.2–12.9)
POIKILOCYTOSIS BLD QL SMEAR: SLIGHT
POTASSIUM SERPL-SCNC: 4.4 MMOL/L (ref 3.5–5.1)
PROT SERPL-MCNC: 8 G/DL (ref 6–8.4)
RBC # BLD AUTO: 4.42 M/UL (ref 4.6–6.2)
SODIUM SERPL-SCNC: 142 MMOL/L (ref 136–145)
TRIGL SERPL-MCNC: 59 MG/DL (ref 30–150)
TSH SERPL DL<=0.005 MIU/L-ACNC: 1.35 UIU/ML (ref 0.4–4)
WBC # BLD AUTO: 4.86 K/UL (ref 3.9–12.7)

## 2020-08-28 PROCEDURE — 1159F PR MEDICATION LIST DOCUMENTED IN MEDICAL RECORD: ICD-10-PCS | Mod: S$GLB,,, | Performed by: FAMILY MEDICINE

## 2020-08-28 PROCEDURE — 99999 PR PBB SHADOW E&M-EST. PATIENT-LVL IV: ICD-10-PCS | Mod: PBBFAC,,, | Performed by: FAMILY MEDICINE

## 2020-08-28 PROCEDURE — 3075F PR MOST RECENT SYSTOLIC BLOOD PRESS GE 130-139MM HG: ICD-10-PCS | Mod: CPTII,S$GLB,, | Performed by: FAMILY MEDICINE

## 2020-08-28 PROCEDURE — 85025 COMPLETE CBC W/AUTO DIFF WBC: CPT

## 2020-08-28 PROCEDURE — 1125F PR PAIN SEVERITY QUANTIFIED, PAIN PRESENT: ICD-10-PCS | Mod: S$GLB,,, | Performed by: FAMILY MEDICINE

## 2020-08-28 PROCEDURE — 80053 COMPREHEN METABOLIC PANEL: CPT

## 2020-08-28 PROCEDURE — 83036 HEMOGLOBIN GLYCOSYLATED A1C: CPT

## 2020-08-28 PROCEDURE — 99499 RISK ADDL DX/OHS AUDIT: ICD-10-PCS | Mod: S$GLB,,, | Performed by: FAMILY MEDICINE

## 2020-08-28 PROCEDURE — 1101F PR PT FALLS ASSESS DOC 0-1 FALLS W/OUT INJ PAST YR: ICD-10-PCS | Mod: CPTII,S$GLB,, | Performed by: FAMILY MEDICINE

## 2020-08-28 PROCEDURE — 36415 COLL VENOUS BLD VENIPUNCTURE: CPT

## 2020-08-28 PROCEDURE — 99214 OFFICE O/P EST MOD 30 MIN: CPT | Mod: S$GLB,,, | Performed by: FAMILY MEDICINE

## 2020-08-28 PROCEDURE — 1101F PT FALLS ASSESS-DOCD LE1/YR: CPT | Mod: CPTII,S$GLB,, | Performed by: FAMILY MEDICINE

## 2020-08-28 PROCEDURE — 99999 PR PBB SHADOW E&M-EST. PATIENT-LVL IV: CPT | Mod: PBBFAC,,, | Performed by: FAMILY MEDICINE

## 2020-08-28 PROCEDURE — 3078F DIAST BP <80 MM HG: CPT | Mod: CPTII,S$GLB,, | Performed by: FAMILY MEDICINE

## 2020-08-28 PROCEDURE — 80061 LIPID PANEL: CPT

## 2020-08-28 PROCEDURE — 99499 UNLISTED E&M SERVICE: CPT | Mod: S$GLB,,, | Performed by: FAMILY MEDICINE

## 2020-08-28 PROCEDURE — 1125F AMNT PAIN NOTED PAIN PRSNT: CPT | Mod: S$GLB,,, | Performed by: FAMILY MEDICINE

## 2020-08-28 PROCEDURE — 99214 PR OFFICE/OUTPT VISIT, EST, LEVL IV, 30-39 MIN: ICD-10-PCS | Mod: S$GLB,,, | Performed by: FAMILY MEDICINE

## 2020-08-28 PROCEDURE — 3075F SYST BP GE 130 - 139MM HG: CPT | Mod: CPTII,S$GLB,, | Performed by: FAMILY MEDICINE

## 2020-08-28 PROCEDURE — 3078F PR MOST RECENT DIASTOLIC BLOOD PRESSURE < 80 MM HG: ICD-10-PCS | Mod: CPTII,S$GLB,, | Performed by: FAMILY MEDICINE

## 2020-08-28 PROCEDURE — 1159F MED LIST DOCD IN RCRD: CPT | Mod: S$GLB,,, | Performed by: FAMILY MEDICINE

## 2020-08-28 PROCEDURE — 84443 ASSAY THYROID STIM HORMONE: CPT

## 2020-08-28 RX ORDER — DICLOFENAC SODIUM 10 MG/G
2 GEL TOPICAL DAILY
Qty: 150 G | Refills: 2 | Status: ON HOLD | OUTPATIENT
Start: 2020-08-28 | End: 2023-10-29 | Stop reason: HOSPADM

## 2020-08-28 NOTE — PROGRESS NOTES
Subjective:       Patient ID: Dwayne Saravia is a 85 y.o. male.    Chief Complaint: Follow-up    HPI    Here with wife.     85yoM PMHx H/o CVA with residual left sided weakness, aphasia, HTN for follow up. LOV 5/2020.    HRA visit with QIANA Arellano on 8/20/20     Today: Right knee pain x 4-5 months. Pt reports no swelling. Will feel pain with walking in knee. Non-radiating.     #Cards: HTN  - current reg: Losartan-HCTZ 100/12.5 qd; Nifedipine 60 bid; Simvastatin 40 qd; ASA 81 qd  - well controlled bp today     #Endo: Osteoporosis (dx ?2019)  - current reg: Alendronate 70 qwk; Vitamin D and Calcium     #Neuro: H/o Mult prior strokes (2007, 2012) with residual lt sided wkness, aphasia  - est w/ Dr. Alvarze, lov 7/2020 --> f/u 3m     #Uro: BPH  - pt reports h/o prostate surgery related to BPH with obstruction. No issues with urine since. Requesting records.     #Former tobacco use: quit ~2018  - states that breathing has improved significantly. Dealt with chronic bronchitis prior to quitting.     Review of Systems   Constitutional: Negative for fatigue and fever.   HENT: Negative for ear pain, sinus pain and sore throat.    Respiratory: Negative for cough and shortness of breath.    Cardiovascular: Negative for chest pain and palpitations.   Gastrointestinal: Negative for abdominal pain, blood in stool, constipation, diarrhea, nausea and vomiting.   Genitourinary: Negative for difficulty urinating, dysuria and hematuria.   Musculoskeletal: Positive for arthralgias. Negative for back pain.   Skin: Negative for rash.   Neurological: Positive for weakness (left sided chronic). Negative for numbness and headaches.         Past Medical History:   Diagnosis Date    BPH (benign prostatic hyperplasia)     CVA (cerebral vascular accident) 2007    Hypertension         Prior to Admission medications    Medication Sig Start Date End Date Taking? Authorizing Provider   alendronate (FOSAMAX) 70 MG tablet Take 70 mg by mouth. 8/5/19  "8/4/20  Historical Provider, MD   ascorbic acid (VITAMIN C) 500 MG tablet Take 500 mg by mouth once daily.    Historical Provider, MD   aspirin 81 MG Chew Take 81 mg by mouth once daily.    Historical Provider, MD   calcium-vitamin D 250-100 mg-unit per tablet Take 1 tablet by mouth.    Historical Provider, MD   losartan-hydrochlorothiazide 100-12.5 mg (HYZAAR) 100-12.5 mg Tab Take 1 tablet by mouth once daily. 7/9/20   William Mabry MD   nifedipine (PROCARDIA-XL) 60 MG (OSM) 24 hr tablet Take 60 mg by mouth once daily.    Historical Provider, MD   omega-3 acid ethyl esters (LOVAZA) 1 gram capsule Take 2 g by mouth.    Historical Provider, MD   simvastatin (ZOCOR) 40 MG tablet Take 1 tablet (40 mg total) by mouth once daily. 7/9/20   Wililam Mabry MD   vitamin D (VITAMIN D3) 1000 units Tab Take 1,000 Units by mouth.    Historical Provider, MD        Past medical history, surgical history, and family medical history reviewed and updated as appropriate.    Medications and allergies reviewed.     Objective:          Vitals:    08/28/20 1038   BP: 130/70   Pulse: 63   SpO2: 98%   Weight: 70.1 kg (154 lb 8.7 oz)   Height: 5' 8" (1.727 m)     Body mass index is 23.5 kg/m².  Physical Exam  Vitals signs and nursing note reviewed.   Constitutional:       General: He is not in acute distress.     Appearance: Normal appearance. He is well-developed.      Comments: Walks with cane   Cardiovascular:      Rate and Rhythm: Normal rate and regular rhythm.      Pulses: Normal pulses.      Heart sounds: Normal heart sounds. No murmur.   Pulmonary:      Effort: Pulmonary effort is normal. No respiratory distress.      Breath sounds: Normal breath sounds. No wheezing.   Abdominal:      General: Bowel sounds are normal. There is no distension.      Palpations: Abdomen is soft.      Tenderness: There is no abdominal tenderness.   Musculoskeletal:      Right knee: He exhibits normal range of motion and no swelling.      Comments: " Crepitus right knee   Neurological:      Mental Status: He is alert and oriented to person, place, and time.   Psychiatric:         Mood and Affect: Mood normal.         Behavior: Behavior normal.         Lab Results   Component Value Date    WBC 5.61 02/02/2011    HGB 12.4 (L) 02/02/2011    HCT 37.0 (L) 02/02/2011     02/02/2011    ALT 10 02/02/2011    AST 14 02/02/2011     02/02/2011    K 4.5 02/02/2011     02/02/2011    CREATININE 1.0 02/02/2011    BUN 16 02/02/2011    CO2 23 02/02/2011    PSA 22 (H) 11/19/2008    INR 1.0 02/01/2011       Assessment:       1. Encounter for long-term (current) use of other medications    2. Former tobacco use    3. Age-related osteoporosis without current pathological fracture    4. H/O: CVA (cerebrovascular accident)    5. Renal hypertension    6. Chronic renal disease, stage III    7. Chronic pain of right knee        Plan:   Dwayne was seen today for follow-up.    Diagnoses and all orders for this visit:    Chronic stable conditions addressed today, reviewed prior records from HRA and Neuro appts. Labs today and f/u results.     Pt wants to refrain from xray at this time for knee. Will start topical antiinflammatory for knee.    Encounter for long-term (current) use of other medications  -     Comprehensive metabolic panel; Future  -     CBC auto differential; Future  -     Lipid Panel; Future  -     Hemoglobin A1C; Future  -     TSH; Future    Former tobacco use  Cont to abstain, pt doing well with cessation. Symptoms have improved  Age-related osteoporosis without current pathological fracture    H/O: CVA (cerebrovascular accident)  -     Lipid Panel; Future    Renal hypertension  -     Comprehensive metabolic panel; Future  -     Hemoglobin A1C; Future  -     TSH; Future    Chronic renal disease, stage III  -     Comprehensive metabolic panel; Future  -     CBC auto differential; Future  -     Urinalysis; Future    Chronic pain of right knee  -      diclofenac sodium (VOLTAREN) 1 % Gel; Apply 2 g topically once daily.        Health maintenance reviewed with patient. shingrix today.    Follow up in about 3 months (around 11/28/2020) for Checkup.    William Mabry MD  Family Medicine  [facility]  8/28/2020

## 2021-01-04 ENCOUNTER — TELEPHONE (OUTPATIENT)
Dept: INTERNAL MEDICINE | Facility: CLINIC | Age: 86
End: 2021-01-04

## 2021-01-04 ENCOUNTER — LAB VISIT (OUTPATIENT)
Dept: INTERNAL MEDICINE | Facility: CLINIC | Age: 86
End: 2021-01-04
Payer: MEDICARE

## 2021-01-04 DIAGNOSIS — Z03.818 ENCOUNTER FOR OBSERVATION FOR SUSPECTED EXPOSURE TO OTHER BIOLOGICAL AGENTS RULED OUT: ICD-10-CM

## 2021-01-04 PROCEDURE — U0003 INFECTIOUS AGENT DETECTION BY NUCLEIC ACID (DNA OR RNA); SEVERE ACUTE RESPIRATORY SYNDROME CORONAVIRUS 2 (SARS-COV-2) (CORONAVIRUS DISEASE [COVID-19]), AMPLIFIED PROBE TECHNIQUE, MAKING USE OF HIGH THROUGHPUT TECHNOLOGIES AS DESCRIBED BY CMS-2020-01-R: HCPCS | Mod: HCNC

## 2021-01-05 ENCOUNTER — TELEPHONE (OUTPATIENT)
Dept: INTERNAL MEDICINE | Facility: CLINIC | Age: 86
End: 2021-01-05

## 2021-01-05 LAB — SARS-COV-2 RNA RESP QL NAA+PROBE: NOT DETECTED

## 2021-05-03 ENCOUNTER — PES CALL (OUTPATIENT)
Dept: ADMINISTRATIVE | Facility: CLINIC | Age: 86
End: 2021-05-03

## 2021-05-10 ENCOUNTER — LAB VISIT (OUTPATIENT)
Dept: LAB | Facility: HOSPITAL | Age: 86
End: 2021-05-10
Attending: INTERNAL MEDICINE
Payer: MEDICARE

## 2021-05-10 ENCOUNTER — OFFICE VISIT (OUTPATIENT)
Dept: INTERNAL MEDICINE | Facility: CLINIC | Age: 86
End: 2021-05-10
Payer: MEDICARE

## 2021-05-10 VITALS
SYSTOLIC BLOOD PRESSURE: 138 MMHG | OXYGEN SATURATION: 99 % | DIASTOLIC BLOOD PRESSURE: 70 MMHG | HEART RATE: 50 BPM | WEIGHT: 147.94 LBS | BODY MASS INDEX: 22.42 KG/M2 | HEIGHT: 68 IN

## 2021-05-10 DIAGNOSIS — J43.8 OTHER EMPHYSEMA: ICD-10-CM

## 2021-05-10 DIAGNOSIS — R26.89 GAIT, ANTALGIC: ICD-10-CM

## 2021-05-10 DIAGNOSIS — N18.30 STAGE 3 CHRONIC KIDNEY DISEASE, UNSPECIFIED WHETHER STAGE 3A OR 3B CKD: ICD-10-CM

## 2021-05-10 DIAGNOSIS — Z87.891 FORMER TOBACCO USE: ICD-10-CM

## 2021-05-10 DIAGNOSIS — I10 ESSENTIAL HYPERTENSION: Primary | ICD-10-CM

## 2021-05-10 DIAGNOSIS — I69.351 HEMIPARESIS AFFECTING RIGHT SIDE AS LATE EFFECT OF STROKE: ICD-10-CM

## 2021-05-10 DIAGNOSIS — I10 ESSENTIAL HYPERTENSION: ICD-10-CM

## 2021-05-10 LAB
ALBUMIN SERPL BCP-MCNC: 3.4 G/DL (ref 3.5–5.2)
ALP SERPL-CCNC: 85 U/L (ref 55–135)
ALT SERPL W/O P-5'-P-CCNC: 10 U/L (ref 10–44)
ANION GAP SERPL CALC-SCNC: 6 MMOL/L (ref 8–16)
AST SERPL-CCNC: 19 U/L (ref 10–40)
BILIRUB SERPL-MCNC: 0.6 MG/DL (ref 0.1–1)
BUN SERPL-MCNC: 19 MG/DL (ref 8–23)
CALCIUM SERPL-MCNC: 9.2 MG/DL (ref 8.7–10.5)
CHLORIDE SERPL-SCNC: 105 MMOL/L (ref 95–110)
CHOLEST SERPL-MCNC: 122 MG/DL (ref 120–199)
CHOLEST/HDLC SERPL: 3 {RATIO} (ref 2–5)
CO2 SERPL-SCNC: 27 MMOL/L (ref 23–29)
CREAT SERPL-MCNC: 1.3 MG/DL (ref 0.5–1.4)
EST. GFR  (AFRICAN AMERICAN): 57.1 ML/MIN/1.73 M^2
EST. GFR  (NON AFRICAN AMERICAN): 49.4 ML/MIN/1.73 M^2
GLUCOSE SERPL-MCNC: 95 MG/DL (ref 70–110)
HDLC SERPL-MCNC: 41 MG/DL (ref 40–75)
HDLC SERPL: 33.6 % (ref 20–50)
LDLC SERPL CALC-MCNC: 61.6 MG/DL (ref 63–159)
NONHDLC SERPL-MCNC: 81 MG/DL
POTASSIUM SERPL-SCNC: 4.5 MMOL/L (ref 3.5–5.1)
PROT SERPL-MCNC: 7.4 G/DL (ref 6–8.4)
SODIUM SERPL-SCNC: 138 MMOL/L (ref 136–145)
TRIGL SERPL-MCNC: 97 MG/DL (ref 30–150)

## 2021-05-10 PROCEDURE — 99214 PR OFFICE/OUTPT VISIT, EST, LEVL IV, 30-39 MIN: ICD-10-PCS | Mod: S$GLB,,, | Performed by: INTERNAL MEDICINE

## 2021-05-10 PROCEDURE — 1125F PR PAIN SEVERITY QUANTIFIED, PAIN PRESENT: ICD-10-PCS | Mod: S$GLB,,, | Performed by: INTERNAL MEDICINE

## 2021-05-10 PROCEDURE — 1101F PR PT FALLS ASSESS DOC 0-1 FALLS W/OUT INJ PAST YR: ICD-10-PCS | Mod: CPTII,S$GLB,, | Performed by: INTERNAL MEDICINE

## 2021-05-10 PROCEDURE — 99214 OFFICE O/P EST MOD 30 MIN: CPT | Mod: S$GLB,,, | Performed by: INTERNAL MEDICINE

## 2021-05-10 PROCEDURE — 99999 PR PBB SHADOW E&M-EST. PATIENT-LVL III: CPT | Mod: PBBFAC,,, | Performed by: INTERNAL MEDICINE

## 2021-05-10 PROCEDURE — 36415 COLL VENOUS BLD VENIPUNCTURE: CPT | Performed by: INTERNAL MEDICINE

## 2021-05-10 PROCEDURE — 1159F PR MEDICATION LIST DOCUMENTED IN MEDICAL RECORD: ICD-10-PCS | Mod: S$GLB,,, | Performed by: INTERNAL MEDICINE

## 2021-05-10 PROCEDURE — 99499 RISK ADDL DX/OHS AUDIT: ICD-10-PCS | Mod: S$GLB,,, | Performed by: INTERNAL MEDICINE

## 2021-05-10 PROCEDURE — 80053 COMPREHEN METABOLIC PANEL: CPT | Performed by: INTERNAL MEDICINE

## 2021-05-10 PROCEDURE — 80061 LIPID PANEL: CPT | Performed by: INTERNAL MEDICINE

## 2021-05-10 PROCEDURE — 99499 UNLISTED E&M SERVICE: CPT | Mod: S$GLB,,, | Performed by: INTERNAL MEDICINE

## 2021-05-10 PROCEDURE — 1159F MED LIST DOCD IN RCRD: CPT | Mod: S$GLB,,, | Performed by: INTERNAL MEDICINE

## 2021-05-10 PROCEDURE — 1101F PT FALLS ASSESS-DOCD LE1/YR: CPT | Mod: CPTII,S$GLB,, | Performed by: INTERNAL MEDICINE

## 2021-05-10 PROCEDURE — 3288F PR FALLS RISK ASSESSMENT DOCUMENTED: ICD-10-PCS | Mod: CPTII,S$GLB,, | Performed by: INTERNAL MEDICINE

## 2021-05-10 PROCEDURE — 3288F FALL RISK ASSESSMENT DOCD: CPT | Mod: CPTII,S$GLB,, | Performed by: INTERNAL MEDICINE

## 2021-05-10 PROCEDURE — 99999 PR PBB SHADOW E&M-EST. PATIENT-LVL III: ICD-10-PCS | Mod: PBBFAC,,, | Performed by: INTERNAL MEDICINE

## 2021-05-10 PROCEDURE — 1125F AMNT PAIN NOTED PAIN PRSNT: CPT | Mod: S$GLB,,, | Performed by: INTERNAL MEDICINE

## 2021-05-27 ENCOUNTER — TELEPHONE (OUTPATIENT)
Dept: INTERNAL MEDICINE | Facility: CLINIC | Age: 86
End: 2021-05-27

## 2021-05-28 ENCOUNTER — TELEPHONE (OUTPATIENT)
Dept: INTERNAL MEDICINE | Facility: CLINIC | Age: 86
End: 2021-05-28

## 2021-05-28 ENCOUNTER — OFFICE VISIT (OUTPATIENT)
Dept: INTERNAL MEDICINE | Facility: CLINIC | Age: 86
End: 2021-05-28
Payer: MEDICARE

## 2021-05-28 VITALS
HEIGHT: 68 IN | WEIGHT: 154.56 LBS | BODY MASS INDEX: 23.43 KG/M2 | DIASTOLIC BLOOD PRESSURE: 78 MMHG | HEART RATE: 56 BPM | SYSTOLIC BLOOD PRESSURE: 124 MMHG | OXYGEN SATURATION: 95 %

## 2021-05-28 DIAGNOSIS — M81.0 AGE-RELATED OSTEOPOROSIS WITHOUT CURRENT PATHOLOGICAL FRACTURE: ICD-10-CM

## 2021-05-28 DIAGNOSIS — Z00.00 ENCOUNTER FOR PREVENTIVE HEALTH EXAMINATION: Primary | ICD-10-CM

## 2021-05-28 DIAGNOSIS — K21.9 GASTROESOPHAGEAL REFLUX DISEASE WITHOUT ESOPHAGITIS: ICD-10-CM

## 2021-05-28 DIAGNOSIS — Z87.891 FORMER TOBACCO USE: ICD-10-CM

## 2021-05-28 DIAGNOSIS — I69.322 DYSARTHRIA FOLLOWING CEREBRAL INFARCTION: ICD-10-CM

## 2021-05-28 DIAGNOSIS — Z86.73 H/O: CVA (CEREBROVASCULAR ACCIDENT): ICD-10-CM

## 2021-05-28 DIAGNOSIS — N18.30 STAGE 3 CHRONIC KIDNEY DISEASE, UNSPECIFIED WHETHER STAGE 3A OR 3B CKD: ICD-10-CM

## 2021-05-28 DIAGNOSIS — I69.351 HEMIPARESIS AFFECTING RIGHT SIDE AS LATE EFFECT OF STROKE: ICD-10-CM

## 2021-05-28 DIAGNOSIS — N40.0 BENIGN PROSTATIC HYPERPLASIA WITHOUT LOWER URINARY TRACT SYMPTOMS: ICD-10-CM

## 2021-05-28 DIAGNOSIS — R26.9 ABNORMALITY OF GAIT AND MOBILITY: ICD-10-CM

## 2021-05-28 DIAGNOSIS — Z99.89 DEPENDENCE ON OTHER ENABLING MACHINES AND DEVICES: ICD-10-CM

## 2021-05-28 DIAGNOSIS — R26.89 GAIT, ANTALGIC: ICD-10-CM

## 2021-05-28 PROCEDURE — 1101F PT FALLS ASSESS-DOCD LE1/YR: CPT | Mod: CPTII,S$GLB,, | Performed by: NURSE PRACTITIONER

## 2021-05-28 PROCEDURE — 3288F FALL RISK ASSESSMENT DOCD: CPT | Mod: CPTII,S$GLB,, | Performed by: NURSE PRACTITIONER

## 2021-05-28 PROCEDURE — 3288F PR FALLS RISK ASSESSMENT DOCUMENTED: ICD-10-PCS | Mod: CPTII,S$GLB,, | Performed by: NURSE PRACTITIONER

## 2021-05-28 PROCEDURE — 99999 PR PBB SHADOW E&M-EST. PATIENT-LVL IV: CPT | Mod: PBBFAC,,, | Performed by: NURSE PRACTITIONER

## 2021-05-28 PROCEDURE — 1158F PR ADVANCE CARE PLANNING DISCUSS DOCUMENTED IN MEDICAL RECORD: ICD-10-PCS | Mod: S$GLB,,, | Performed by: NURSE PRACTITIONER

## 2021-05-28 PROCEDURE — G0439 PR MEDICARE ANNUAL WELLNESS SUBSEQUENT VISIT: ICD-10-PCS | Mod: S$GLB,,, | Performed by: NURSE PRACTITIONER

## 2021-05-28 PROCEDURE — 99999 PR PBB SHADOW E&M-EST. PATIENT-LVL IV: ICD-10-PCS | Mod: PBBFAC,,, | Performed by: NURSE PRACTITIONER

## 2021-05-28 PROCEDURE — 1125F AMNT PAIN NOTED PAIN PRSNT: CPT | Mod: S$GLB,,, | Performed by: NURSE PRACTITIONER

## 2021-05-28 PROCEDURE — 1125F PR PAIN SEVERITY QUANTIFIED, PAIN PRESENT: ICD-10-PCS | Mod: S$GLB,,, | Performed by: NURSE PRACTITIONER

## 2021-05-28 PROCEDURE — G0439 PPPS, SUBSEQ VISIT: HCPCS | Mod: S$GLB,,, | Performed by: NURSE PRACTITIONER

## 2021-05-28 PROCEDURE — 1158F ADVNC CARE PLAN TLK DOCD: CPT | Mod: S$GLB,,, | Performed by: NURSE PRACTITIONER

## 2021-05-28 PROCEDURE — 1101F PR PT FALLS ASSESS DOC 0-1 FALLS W/OUT INJ PAST YR: ICD-10-PCS | Mod: CPTII,S$GLB,, | Performed by: NURSE PRACTITIONER

## 2021-06-02 ENCOUNTER — OUTPATIENT CASE MANAGEMENT (OUTPATIENT)
Dept: ADMINISTRATIVE | Facility: OTHER | Age: 86
End: 2021-06-02

## 2021-09-23 ENCOUNTER — LAB VISIT (OUTPATIENT)
Dept: PRIMARY CARE CLINIC | Facility: OTHER | Age: 86
End: 2021-09-23
Attending: INTERNAL MEDICINE
Payer: MEDICARE

## 2021-09-23 DIAGNOSIS — Z20.822 ENCOUNTER FOR LABORATORY TESTING FOR COVID-19 VIRUS: ICD-10-CM

## 2021-09-23 LAB
SARS-COV-2 RNA RESP QL NAA+PROBE: NOT DETECTED
SARS-COV-2- CYCLE NUMBER: NORMAL

## 2021-09-23 PROCEDURE — U0003 INFECTIOUS AGENT DETECTION BY NUCLEIC ACID (DNA OR RNA); SEVERE ACUTE RESPIRATORY SYNDROME CORONAVIRUS 2 (SARS-COV-2) (CORONAVIRUS DISEASE [COVID-19]), AMPLIFIED PROBE TECHNIQUE, MAKING USE OF HIGH THROUGHPUT TECHNOLOGIES AS DESCRIBED BY CMS-2020-01-R: HCPCS | Mod: HCNC | Performed by: INTERNAL MEDICINE

## 2022-09-30 ENCOUNTER — HOSPITAL ENCOUNTER (OUTPATIENT)
Dept: RADIOLOGY | Facility: HOSPITAL | Age: 87
Discharge: HOME | End: 2022-09-30
Attending: FAMILY MEDICINE
Payer: MEDICARE

## 2022-09-30 DIAGNOSIS — J44.9 COPD, MODERATE: ICD-10-CM

## 2022-09-30 PROCEDURE — 71046 X-RAY EXAM CHEST 2 VIEWS: CPT | Mod: 26,,, | Performed by: RADIOLOGY

## 2022-09-30 PROCEDURE — 71046 X-RAY EXAM CHEST 2 VIEWS: CPT | Mod: TC,PN

## 2022-09-30 PROCEDURE — 71046 XR CHEST PA AND LATERAL: ICD-10-PCS | Mod: 26,,, | Performed by: RADIOLOGY

## 2022-10-29 ENCOUNTER — OFFICE VISIT (OUTPATIENT)
Dept: URGENT CARE | Facility: CLINIC | Age: 87
End: 2022-10-29
Payer: MEDICARE

## 2022-10-29 VITALS
TEMPERATURE: 99 F | OXYGEN SATURATION: 95 % | HEART RATE: 65 BPM | SYSTOLIC BLOOD PRESSURE: 137 MMHG | HEIGHT: 68 IN | DIASTOLIC BLOOD PRESSURE: 86 MMHG | RESPIRATION RATE: 18 BRPM | WEIGHT: 156 LBS | BODY MASS INDEX: 23.64 KG/M2
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
## 2022-10-29 DIAGNOSIS — I10 PRIMARY HYPERTENSION: Primary | ICD-10-CM

## 2022-10-29 DIAGNOSIS — M25.559 HIP PAIN: ICD-10-CM

## 2022-10-29 DIAGNOSIS — M54.50 RIGHT-SIDED LOW BACK PAIN WITHOUT SCIATICA, UNSPECIFIED CHRONICITY: ICD-10-CM

## 2022-10-29 PROCEDURE — 72100 XR LUMBAR SPINE AP AND LATERAL: ICD-10-PCS | Mod: S$GLB,,, | Performed by: RADIOLOGY

## 2022-10-29 PROCEDURE — 72100 X-RAY EXAM L-S SPINE 2/3 VWS: CPT | Mod: S$GLB,,, | Performed by: RADIOLOGY

## 2022-10-29 PROCEDURE — 73502 X-RAY EXAM HIP UNI 2-3 VIEWS: CPT | Mod: RT,S$GLB,, | Performed by: RADIOLOGY

## 2022-10-29 PROCEDURE — 1125F PR PAIN SEVERITY QUANTIFIED, PAIN PRESENT: ICD-10-PCS | Mod: CPTII,S$GLB,, | Performed by: FAMILY MEDICINE

## 2022-10-29 PROCEDURE — 1159F PR MEDICATION LIST DOCUMENTED IN MEDICAL RECORD: ICD-10-PCS | Mod: CPTII,S$GLB,, | Performed by: FAMILY MEDICINE

## 2022-10-29 PROCEDURE — 73502 XR HIP WITH PELVIS WHEN PERFORMED, 2 OR 3  VIEWS RIGHT: ICD-10-PCS | Mod: RT,S$GLB,, | Performed by: RADIOLOGY

## 2022-10-29 PROCEDURE — 99214 OFFICE O/P EST MOD 30 MIN: CPT | Mod: S$GLB,,, | Performed by: FAMILY MEDICINE

## 2022-10-29 PROCEDURE — 1160F RVW MEDS BY RX/DR IN RCRD: CPT | Mod: CPTII,S$GLB,, | Performed by: FAMILY MEDICINE

## 2022-10-29 PROCEDURE — 99214 PR OFFICE/OUTPT VISIT, EST, LEVL IV, 30-39 MIN: ICD-10-PCS | Mod: S$GLB,,, | Performed by: FAMILY MEDICINE

## 2022-10-29 PROCEDURE — 1160F PR REVIEW ALL MEDS BY PRESCRIBER/CLIN PHARMACIST DOCUMENTED: ICD-10-PCS | Mod: CPTII,S$GLB,, | Performed by: FAMILY MEDICINE

## 2022-10-29 PROCEDURE — 1159F MED LIST DOCD IN RCRD: CPT | Mod: CPTII,S$GLB,, | Performed by: FAMILY MEDICINE

## 2022-10-29 PROCEDURE — 1125F AMNT PAIN NOTED PAIN PRSNT: CPT | Mod: CPTII,S$GLB,, | Performed by: FAMILY MEDICINE

## 2022-10-29 RX ORDER — NAPROXEN 375 MG/1
375 TABLET ORAL 2 TIMES DAILY WITH MEALS
Qty: 20 TABLET | Refills: 0 | Status: ON HOLD | OUTPATIENT
Start: 2022-10-29 | End: 2023-10-29 | Stop reason: HOSPADM

## 2022-10-29 RX ORDER — CLONIDINE HYDROCHLORIDE 0.1 MG/1
0.2 TABLET ORAL
Status: COMPLETED | OUTPATIENT
Start: 2022-10-29 | End: 2022-10-29

## 2022-10-29 RX ORDER — METHOCARBAMOL 500 MG/1
500 TABLET, FILM COATED ORAL 2 TIMES DAILY
Qty: 14 TABLET | Refills: 0 | Status: SHIPPED | OUTPATIENT
Start: 2022-10-29 | End: 2022-11-05

## 2022-10-29 RX ADMIN — CLONIDINE HYDROCHLORIDE 0.2 MG: 0.1 TABLET ORAL at 04:10

## 2022-10-29 NOTE — PROGRESS NOTES
"Subjective:       Patient ID: Dwayne Saravia is a 87 y.o. male.    Vitals:  height is 5' 8" (1.727 m) and weight is 70.8 kg (156 lb). His temperature is 98.6 °F (37 °C). His blood pressure is 137/86 and his pulse is 65. His respiration is 18 and oxygen saturation is 95%.     Chief Complaint: Back Pain    Pt states right sided lower back and hip pain after a fall 3 weeks ago.    Back Pain  This is a new problem. The current episode started 1 to 4 weeks ago. The problem occurs constantly. The problem is unchanged. The pain is present in the lumbar spine. The pain is at a severity of 8/10. Pertinent negatives include no abdominal pain, bladder incontinence, bowel incontinence, chest pain, dysuria, fever, headaches, leg pain, numbness, paresis, paresthesias, pelvic pain, perianal numbness, tingling, weakness or weight loss. Treatments tried: extra strength tylenol. The treatment provided mild relief.     Constitution: Negative for fever.   Cardiovascular:  Negative for chest pain.   Gastrointestinal:  Negative for abdominal pain and bowel incontinence.   Genitourinary:  Negative for dysuria, bladder incontinence and pelvic pain.   Musculoskeletal:  Positive for back pain.   Neurological:  Negative for headaches and numbness.     Objective:      Physical Exam   Constitutional: He is oriented to person, place, and time. He appears well-developed. He is cooperative.  Non-toxic appearance. He does not appear ill. No distress.   HENT:   Head: Normocephalic and atraumatic. Head is without abrasion, without contusion and without laceration.   Ears:   Right Ear: Hearing, tympanic membrane, external ear and ear canal normal. No hemotympanum. impacted cerumen  Left Ear: Hearing, tympanic membrane, external ear and ear canal normal. No hemotympanum. impacted cerumen  Nose: Nose normal. No mucosal edema, rhinorrhea or nasal deformity. No epistaxis. Right sinus exhibits no maxillary sinus tenderness and no frontal sinus tenderness. " Left sinus exhibits no maxillary sinus tenderness and no frontal sinus tenderness.   Mouth/Throat: Uvula is midline, oropharynx is clear and moist and mucous membranes are normal. No trismus in the jaw. Normal dentition. No uvula swelling. No posterior oropharyngeal erythema.   Eyes: Conjunctivae, EOM and lids are normal. Pupils are equal, round, and reactive to light. Right eye exhibits no discharge. Left eye exhibits no discharge. No scleral icterus.   Neck: Trachea normal and phonation normal. Neck supple. No tracheal deviation present. No neck rigidity present. No spinous process tenderness present. No muscular tenderness present.   Cardiovascular: Normal rate, regular rhythm, normal heart sounds and normal pulses.   No murmur heard.  Pulmonary/Chest: Effort normal and breath sounds normal. No stridor. No respiratory distress. He has no wheezes. He has no rhonchi. He has no rales.   Abdominal: Normal appearance and bowel sounds are normal. He exhibits no distension and no mass. Soft. There is no abdominal tenderness.   Musculoskeletal: Normal range of motion.         General: Signs of injury present. No deformity. Normal range of motion.      Comments:   Back exam:    No TTP   No redness, swelling, bruise   ROM:   Lateral flexion mildly limited secondary to muscle spasm,   Forward flexion mildly limited secondary to muscle spasm.  No sensory/motor deficit  SLR: WNL    Right Hip;  Painful and restricted ROM  No tenderness  No bruise, abrasion      Neurological: no focal deficit. He is alert, oriented to person, place, and time and at baseline. He has normal strength. He displays no weakness. No cranial nerve deficit or sensory deficit. He exhibits normal muscle tone. He displays no seizure activity. Coordination normal. GCS eye subscore is 4. GCS verbal subscore is 5. GCS motor subscore is 6.   Skin: Skin is warm, dry, intact, not diaphoretic and not pale. Capillary refill takes less than 2 seconds. No abrasion,  No burn, No bruising and No ecchymosis   Psychiatric: His speech is normal and behavior is normal. Mood, judgment and thought content normal.   Nursing note and vitals reviewed.      Assessment:       1. Primary hypertension    2. Fall, sequela    3. Right-sided low back pain without sciatica, unspecified chronicity    4. Hip pain          Plan:     Xrays are negative for acute injury. Discussed results/diagnosis/plan with patient in clinic. Advised to f/u with PCP within 2-5 days. ER precautions given if symptoms get any worse. All questions answered. Patient verbally understood and agreed with treatment plan.     Primary hypertension    Fall, sequela  -     X-Ray Lumbar Spine Ap And Lateral; Future; Expected date: 10/29/2022  -     X-Ray Hip 2 or 3 views Right (with Pelvis when performed); Future; Expected date: 10/29/2022    Right-sided low back pain without sciatica, unspecified chronicity  -     X-Ray Lumbar Spine Ap And Lateral; Future; Expected date: 10/29/2022  -     X-Ray Hip 2 or 3 views Right (with Pelvis when performed); Future; Expected date: 10/29/2022    Hip pain  -     X-Ray Hip 2 or 3 views Right (with Pelvis when performed); Future; Expected date: 10/29/2022    Other orders  -     cloNIDine tablet 0.2 mg  -     naproxen (EC-NAPROSYN) 375 MG TbEC EC tablet; Take 1 tablet (375 mg total) by mouth 2 (two) times daily with meals.  Dispense: 20 tablet; Refill: 0  -     methocarbamoL (ROBAXIN) 500 MG Tab; Take 1 tablet (500 mg total) by mouth 2 (two) times a day. As needed for muscle spasm. May cause drowsiness for 7 days  Dispense: 14 tablet; Refill: 0

## 2022-11-22 ENCOUNTER — PES CALL (OUTPATIENT)
Dept: ADMINISTRATIVE | Facility: CLINIC | Age: 87
End: 2022-11-22
Payer: MEDICARE

## 2022-11-28 ENCOUNTER — PES CALL (OUTPATIENT)
Dept: ADMINISTRATIVE | Facility: CLINIC | Age: 87
End: 2022-11-28
Payer: MEDICARE

## 2023-01-04 ENCOUNTER — PES CALL (OUTPATIENT)
Dept: ADMINISTRATIVE | Facility: OTHER | Age: 88
End: 2023-01-04
Payer: MEDICARE

## 2023-05-23 ENCOUNTER — TELEPHONE (OUTPATIENT)
Dept: PAIN MEDICINE | Facility: CLINIC | Age: 88
End: 2023-05-23
Payer: MEDICARE

## 2023-05-23 NOTE — TELEPHONE ENCOUNTER
Staff left pt a voicemail in regards to a sooner New Patient appointment (pt is originally schedule for 6/23) with Pain Provider Dr. Marina Hernandez MD.

## 2023-10-23 ENCOUNTER — HOSPITAL ENCOUNTER (INPATIENT)
Facility: OTHER | Age: 88
LOS: 6 days | Discharge: HOME HEALTH | DRG: 189 | End: 2023-10-29
Attending: EMERGENCY MEDICINE | Admitting: EMERGENCY MEDICINE
Payer: MEDICARE

## 2023-10-23 DIAGNOSIS — R94.31 ABNORMAL EKG: ICD-10-CM

## 2023-10-23 DIAGNOSIS — R06.82 TACHYPNEA: ICD-10-CM

## 2023-10-23 DIAGNOSIS — J43.8 OTHER EMPHYSEMA: ICD-10-CM

## 2023-10-23 DIAGNOSIS — R79.89 ELEVATED BRAIN NATRIURETIC PEPTIDE (BNP) LEVEL: Primary | ICD-10-CM

## 2023-10-23 DIAGNOSIS — R09.02 HYPOXIA: ICD-10-CM

## 2023-10-23 DIAGNOSIS — J96.01 ACUTE HYPOXIC RESPIRATORY FAILURE: ICD-10-CM

## 2023-10-23 DIAGNOSIS — I50.9 HEART FAILURE: ICD-10-CM

## 2023-10-23 LAB
ALBUMIN SERPL BCP-MCNC: 3.7 G/DL (ref 3.5–5.2)
ALP SERPL-CCNC: 107 U/L (ref 55–135)
ALT SERPL W/O P-5'-P-CCNC: 14 U/L (ref 10–44)
ANION GAP SERPL CALC-SCNC: 12 MMOL/L (ref 8–16)
APTT PPP: 27.5 SEC (ref 21–32)
AST SERPL-CCNC: 23 U/L (ref 10–40)
BACTERIA #/AREA URNS HPF: NORMAL /HPF
BASOPHILS # BLD AUTO: 0.01 K/UL (ref 0–0.2)
BASOPHILS NFR BLD: 0.2 % (ref 0–1.9)
BILIRUB SERPL-MCNC: 0.6 MG/DL (ref 0.1–1)
BILIRUB UR QL STRIP: NEGATIVE
BNP SERPL-MCNC: 198 PG/ML (ref 0–99)
BUN SERPL-MCNC: 19 MG/DL (ref 8–23)
CALCIUM SERPL-MCNC: 9.1 MG/DL (ref 8.7–10.5)
CHLORIDE SERPL-SCNC: 110 MMOL/L (ref 95–110)
CLARITY UR: CLEAR
CO2 SERPL-SCNC: 17 MMOL/L (ref 23–29)
COLOR UR: YELLOW
CREAT SERPL-MCNC: 1.5 MG/DL (ref 0.5–1.4)
CREAT SERPL-MCNC: 1.6 MG/DL (ref 0.5–1.4)
CTP QC/QA: YES
CTP QC/QA: YES
D DIMER PPP IA.FEU-MCNC: 2.53 MG/L FEU
DIFFERENTIAL METHOD: ABNORMAL
EOSINOPHIL # BLD AUTO: 0 K/UL (ref 0–0.5)
EOSINOPHIL NFR BLD: 0.2 % (ref 0–8)
ERYTHROCYTE [DISTWIDTH] IN BLOOD BY AUTOMATED COUNT: 14.6 % (ref 11.5–14.5)
EST. GFR  (NO RACE VARIABLE): 45 ML/MIN/1.73 M^2
GLUCOSE SERPL-MCNC: 83 MG/DL (ref 70–110)
GLUCOSE UR QL STRIP: NEGATIVE
HCO3 UR-SCNC: 25.7 MMOL/L (ref 24–28)
HCT VFR BLD AUTO: 44.4 % (ref 40–54)
HCV AB SERPL QL IA: NEGATIVE
HGB BLD-MCNC: 14.2 G/DL (ref 14–18)
HGB UR QL STRIP: NEGATIVE
HIV 1+2 AB+HIV1 P24 AG SERPL QL IA: NEGATIVE
HYALINE CASTS #/AREA URNS LPF: 0 /LPF
IMM GRANULOCYTES # BLD AUTO: 0.01 K/UL (ref 0–0.04)
IMM GRANULOCYTES NFR BLD AUTO: 0.2 % (ref 0–0.5)
INR PPP: 1 (ref 0.8–1.2)
KETONES UR QL STRIP: NEGATIVE
LACTATE SERPL-SCNC: 1.7 MMOL/L (ref 0.5–2.2)
LEUKOCYTE ESTERASE UR QL STRIP: NEGATIVE
LIPASE SERPL-CCNC: 21 U/L (ref 4–60)
LYMPHOCYTES # BLD AUTO: 0.8 K/UL (ref 1–4.8)
LYMPHOCYTES NFR BLD: 15.4 % (ref 18–48)
MCH RBC QN AUTO: 31.1 PG (ref 27–31)
MCHC RBC AUTO-ENTMCNC: 32 G/DL (ref 32–36)
MCV RBC AUTO: 97 FL (ref 82–98)
MICROSCOPIC COMMENT: NORMAL
MONOCYTES # BLD AUTO: 0.4 K/UL (ref 0.3–1)
MONOCYTES NFR BLD: 7.3 % (ref 4–15)
NEUTROPHILS # BLD AUTO: 3.8 K/UL (ref 1.8–7.7)
NEUTROPHILS NFR BLD: 76.7 % (ref 38–73)
NITRITE UR QL STRIP: NEGATIVE
NRBC BLD-RTO: 0 /100 WBC
PCO2 BLDA: 46.8 MMHG (ref 35–45)
PH SMN: 7.35 [PH] (ref 7.35–7.45)
PH UR STRIP: 8 [PH] (ref 5–8)
PLATELET # BLD AUTO: 167 K/UL (ref 150–450)
PMV BLD AUTO: 12.3 FL (ref 9.2–12.9)
PO2 BLDA: 44 MMHG (ref 40–60)
POC BE: 0 MMOL/L
POC MOLECULAR INFLUENZA A AGN: NEGATIVE
POC MOLECULAR INFLUENZA B AGN: NEGATIVE
POC SATURATED O2: 77 % (ref 95–100)
POC TCO2: 27 MMOL/L (ref 24–29)
POTASSIUM SERPL-SCNC: 4.5 MMOL/L (ref 3.5–5.1)
PROCALCITONIN SERPL IA-MCNC: 0.04 NG/ML
PROT SERPL-MCNC: 7.8 G/DL (ref 6–8.4)
PROT UR QL STRIP: ABNORMAL
PROTHROMBIN TIME: 10.7 SEC (ref 9–12.5)
RBC # BLD AUTO: 4.57 M/UL (ref 4.6–6.2)
RBC #/AREA URNS HPF: 1 /HPF (ref 0–4)
SAMPLE: ABNORMAL
SAMPLE: ABNORMAL
SARS-COV-2 RDRP RESP QL NAA+PROBE: NEGATIVE
SODIUM SERPL-SCNC: 139 MMOL/L (ref 136–145)
SP GR UR STRIP: >1.03 (ref 1–1.03)
TROPONIN I SERPL DL<=0.01 NG/ML-MCNC: 0.01 NG/ML (ref 0–0.03)
TROPONIN I SERPL DL<=0.01 NG/ML-MCNC: 0.01 NG/ML (ref 0–0.03)
TROPONIN I SERPL DL<=0.01 NG/ML-MCNC: <0.006 NG/ML (ref 0–0.03)
URN SPEC COLLECT METH UR: ABNORMAL
UROBILINOGEN UR STRIP-ACNC: NEGATIVE EU/DL
WBC # BLD AUTO: 4.95 K/UL (ref 3.9–12.7)
WBC #/AREA URNS HPF: 1 /HPF (ref 0–5)

## 2023-10-23 PROCEDURE — 25000003 PHARM REV CODE 250: Performed by: HOSPITALIST

## 2023-10-23 PROCEDURE — 63700000 PHARM REV CODE 250 ALT 637 W/O HCPCS

## 2023-10-23 PROCEDURE — 81000 URINALYSIS NONAUTO W/SCOPE: CPT

## 2023-10-23 PROCEDURE — 63600175 PHARM REV CODE 636 W HCPCS

## 2023-10-23 PROCEDURE — 63600175 PHARM REV CODE 636 W HCPCS: Performed by: HOSPITALIST

## 2023-10-23 PROCEDURE — 85610 PROTHROMBIN TIME: CPT | Performed by: HOSPITALIST

## 2023-10-23 PROCEDURE — 80053 COMPREHEN METABOLIC PANEL: CPT

## 2023-10-23 PROCEDURE — 93010 EKG 12-LEAD: ICD-10-PCS | Mod: ,,, | Performed by: INTERNAL MEDICINE

## 2023-10-23 PROCEDURE — 83690 ASSAY OF LIPASE: CPT

## 2023-10-23 PROCEDURE — 84484 ASSAY OF TROPONIN QUANT: CPT | Mod: 91 | Performed by: HOSPITALIST

## 2023-10-23 PROCEDURE — 82803 BLOOD GASES ANY COMBINATION: CPT

## 2023-10-23 PROCEDURE — 96375 TX/PRO/DX INJ NEW DRUG ADDON: CPT

## 2023-10-23 PROCEDURE — 87154 CUL TYP ID BLD PTHGN 6+ TRGT: CPT

## 2023-10-23 PROCEDURE — 85730 THROMBOPLASTIN TIME PARTIAL: CPT | Performed by: HOSPITALIST

## 2023-10-23 PROCEDURE — 86803 HEPATITIS C AB TEST: CPT | Performed by: EMERGENCY MEDICINE

## 2023-10-23 PROCEDURE — 25500020 PHARM REV CODE 255: Performed by: EMERGENCY MEDICINE

## 2023-10-23 PROCEDURE — 36415 COLL VENOUS BLD VENIPUNCTURE: CPT | Performed by: HOSPITALIST

## 2023-10-23 PROCEDURE — 83880 ASSAY OF NATRIURETIC PEPTIDE: CPT

## 2023-10-23 PROCEDURE — 99285 EMERGENCY DEPT VISIT HI MDM: CPT | Mod: 25

## 2023-10-23 PROCEDURE — 87040 BLOOD CULTURE FOR BACTERIA: CPT | Mod: 59

## 2023-10-23 PROCEDURE — 87389 HIV-1 AG W/HIV-1&-2 AB AG IA: CPT | Performed by: EMERGENCY MEDICINE

## 2023-10-23 PROCEDURE — 84484 ASSAY OF TROPONIN QUANT: CPT

## 2023-10-23 PROCEDURE — 11000001 HC ACUTE MED/SURG PRIVATE ROOM

## 2023-10-23 PROCEDURE — 99900035 HC TECH TIME PER 15 MIN (STAT)

## 2023-10-23 PROCEDURE — 96374 THER/PROPH/DIAG INJ IV PUSH: CPT

## 2023-10-23 PROCEDURE — 87635 SARS-COV-2 COVID-19 AMP PRB: CPT | Performed by: HOSPITALIST

## 2023-10-23 PROCEDURE — 85025 COMPLETE CBC W/AUTO DIFF WBC: CPT

## 2023-10-23 PROCEDURE — 85379 FIBRIN DEGRADATION QUANT: CPT | Performed by: HOSPITALIST

## 2023-10-23 PROCEDURE — 93005 ELECTROCARDIOGRAM TRACING: CPT

## 2023-10-23 PROCEDURE — 25000003 PHARM REV CODE 250: Performed by: EMERGENCY MEDICINE

## 2023-10-23 PROCEDURE — 84145 PROCALCITONIN (PCT): CPT

## 2023-10-23 PROCEDURE — 87077 CULTURE AEROBIC IDENTIFY: CPT

## 2023-10-23 PROCEDURE — 83605 ASSAY OF LACTIC ACID: CPT

## 2023-10-23 PROCEDURE — C9113 INJ PANTOPRAZOLE SODIUM, VIA: HCPCS | Performed by: HOSPITALIST

## 2023-10-23 PROCEDURE — 93010 ELECTROCARDIOGRAM REPORT: CPT | Mod: ,,, | Performed by: INTERNAL MEDICINE

## 2023-10-23 RX ORDER — MORPHINE SULFATE 4 MG/ML
4 INJECTION, SOLUTION INTRAMUSCULAR; INTRAVENOUS
Status: COMPLETED | OUTPATIENT
Start: 2023-10-23 | End: 2023-10-23

## 2023-10-23 RX ORDER — FUROSEMIDE 10 MG/ML
40 INJECTION INTRAMUSCULAR; INTRAVENOUS
Status: COMPLETED | OUTPATIENT
Start: 2023-10-23 | End: 2023-10-23

## 2023-10-23 RX ORDER — NIFEDIPINE 60 MG/1
60 TABLET, EXTENDED RELEASE ORAL DAILY
Status: ON HOLD | COMMUNITY
End: 2023-10-29 | Stop reason: HOSPADM

## 2023-10-23 RX ORDER — LOSARTAN POTASSIUM 50 MG/1
50 TABLET ORAL 2 TIMES DAILY
Status: ON HOLD | COMMUNITY
Start: 2023-09-18 | End: 2023-10-29 | Stop reason: SDUPTHER

## 2023-10-23 RX ORDER — HEPARIN SODIUM,PORCINE/D5W 25000/250
0-40 INTRAVENOUS SOLUTION INTRAVENOUS CONTINUOUS
Status: DISCONTINUED | OUTPATIENT
Start: 2023-10-23 | End: 2023-10-25

## 2023-10-23 RX ORDER — HEPARIN SODIUM 5000 [USP'U]/ML
5000 INJECTION, SOLUTION INTRAVENOUS; SUBCUTANEOUS EVERY 8 HOURS
Status: DISCONTINUED | OUTPATIENT
Start: 2023-10-23 | End: 2023-10-23

## 2023-10-23 RX ORDER — ACETAMINOPHEN 500 MG
1000 TABLET ORAL EVERY 8 HOURS PRN
Status: DISCONTINUED | OUTPATIENT
Start: 2023-10-23 | End: 2023-10-29 | Stop reason: HOSPADM

## 2023-10-23 RX ORDER — ONDANSETRON 2 MG/ML
4 INJECTION INTRAMUSCULAR; INTRAVENOUS
Status: COMPLETED | OUTPATIENT
Start: 2023-10-23 | End: 2023-10-23

## 2023-10-23 RX ORDER — CARVEDILOL 6.25 MG/1
6.25 TABLET ORAL 2 TIMES DAILY
Status: ON HOLD | COMMUNITY
Start: 2023-07-25 | End: 2023-10-29 | Stop reason: HOSPADM

## 2023-10-23 RX ORDER — LEVOCETIRIZINE DIHYDROCHLORIDE 5 MG/1
5 TABLET, FILM COATED ORAL NIGHTLY
COMMUNITY

## 2023-10-23 RX ORDER — ATORVASTATIN CALCIUM 40 MG/1
40 TABLET, FILM COATED ORAL DAILY
COMMUNITY
Start: 2023-08-11

## 2023-10-23 RX ORDER — HYDROCHLOROTHIAZIDE 25 MG/1
25 TABLET ORAL DAILY
Status: ON HOLD | COMMUNITY
Start: 2023-05-28 | End: 2023-10-29 | Stop reason: HOSPADM

## 2023-10-23 RX ORDER — AZITHROMYCIN 250 MG/1
500 TABLET, FILM COATED ORAL
Status: COMPLETED | OUTPATIENT
Start: 2023-10-23 | End: 2023-10-23

## 2023-10-23 RX ORDER — AMLODIPINE BESYLATE 5 MG/1
10 TABLET ORAL ONCE
Status: COMPLETED | OUTPATIENT
Start: 2023-10-23 | End: 2023-10-23

## 2023-10-23 RX ORDER — PANTOPRAZOLE SODIUM 40 MG/10ML
40 INJECTION, POWDER, LYOPHILIZED, FOR SOLUTION INTRAVENOUS DAILY
Status: DISCONTINUED | OUTPATIENT
Start: 2023-10-23 | End: 2023-10-29 | Stop reason: HOSPADM

## 2023-10-23 RX ORDER — SODIUM CHLORIDE 0.9 % (FLUSH) 0.9 %
10 SYRINGE (ML) INJECTION
Status: DISCONTINUED | OUTPATIENT
Start: 2023-10-23 | End: 2023-10-29 | Stop reason: HOSPADM

## 2023-10-23 RX ORDER — FUROSEMIDE 40 MG/1
40 TABLET ORAL DAILY
Status: DISCONTINUED | OUTPATIENT
Start: 2023-10-24 | End: 2023-10-24

## 2023-10-23 RX ORDER — NAPROXEN SODIUM 220 MG/1
81 TABLET, FILM COATED ORAL DAILY
Status: DISCONTINUED | OUTPATIENT
Start: 2023-10-24 | End: 2023-10-23

## 2023-10-23 RX ORDER — NAPROXEN SODIUM 220 MG/1
81 TABLET, FILM COATED ORAL DAILY
Status: DISCONTINUED | OUTPATIENT
Start: 2023-10-23 | End: 2023-10-29 | Stop reason: HOSPADM

## 2023-10-23 RX ORDER — ATORVASTATIN CALCIUM 20 MG/1
40 TABLET, FILM COATED ORAL DAILY
Status: DISCONTINUED | OUTPATIENT
Start: 2023-10-24 | End: 2023-10-23

## 2023-10-23 RX ORDER — MORPHINE SULFATE 2 MG/ML
2 INJECTION, SOLUTION INTRAMUSCULAR; INTRAVENOUS EVERY 4 HOURS PRN
Status: DISCONTINUED | OUTPATIENT
Start: 2023-10-23 | End: 2023-10-29 | Stop reason: HOSPADM

## 2023-10-23 RX ORDER — ATORVASTATIN CALCIUM 20 MG/1
40 TABLET, FILM COATED ORAL DAILY
Status: DISCONTINUED | OUTPATIENT
Start: 2023-10-23 | End: 2023-10-29 | Stop reason: HOSPADM

## 2023-10-23 RX ORDER — CARVEDILOL 6.25 MG/1
6.25 TABLET ORAL 2 TIMES DAILY
Status: DISCONTINUED | OUTPATIENT
Start: 2023-10-23 | End: 2023-10-27

## 2023-10-23 RX ORDER — SIMVASTATIN 40 MG/1
40 TABLET, FILM COATED ORAL DAILY
Status: ON HOLD | COMMUNITY
End: 2023-10-29 | Stop reason: HOSPADM

## 2023-10-23 RX ORDER — NIFEDIPINE 30 MG/1
30 TABLET, EXTENDED RELEASE ORAL 2 TIMES DAILY
Status: DISCONTINUED | OUTPATIENT
Start: 2023-10-23 | End: 2023-10-26

## 2023-10-23 RX ADMIN — NIFEDIPINE 30 MG: 30 TABLET, FILM COATED, EXTENDED RELEASE ORAL at 10:10

## 2023-10-23 RX ADMIN — MORPHINE SULFATE 4 MG: 4 INJECTION, SOLUTION INTRAMUSCULAR; INTRAVENOUS at 11:10

## 2023-10-23 RX ADMIN — IOHEXOL 100 ML: 350 INJECTION, SOLUTION INTRAVENOUS at 12:10

## 2023-10-23 RX ADMIN — ACETAMINOPHEN 1000 MG: 500 TABLET ORAL at 10:10

## 2023-10-23 RX ADMIN — ONDANSETRON 4 MG: 2 INJECTION INTRAMUSCULAR; INTRAVENOUS at 11:10

## 2023-10-23 RX ADMIN — ATORVASTATIN CALCIUM 40 MG: 20 TABLET, FILM COATED ORAL at 05:10

## 2023-10-23 RX ADMIN — PANTOPRAZOLE SODIUM 40 MG: 40 INJECTION, POWDER, FOR SOLUTION INTRAVENOUS at 05:10

## 2023-10-23 RX ADMIN — FUROSEMIDE 40 MG: 10 INJECTION, SOLUTION INTRAMUSCULAR; INTRAVENOUS at 03:10

## 2023-10-23 RX ADMIN — AZITHROMYCIN DIHYDRATE 500 MG: 250 TABLET, FILM COATED ORAL at 03:10

## 2023-10-23 RX ADMIN — CARVEDILOL 6.25 MG: 6.25 TABLET, FILM COATED ORAL at 05:10

## 2023-10-23 RX ADMIN — ASPIRIN 81 MG CHEWABLE TABLET 81 MG: 81 TABLET CHEWABLE at 05:10

## 2023-10-23 RX ADMIN — HEPARIN SODIUM 18 UNITS/KG/HR: 10000 INJECTION, SOLUTION INTRAVENOUS at 06:10

## 2023-10-23 RX ADMIN — AMLODIPINE BESYLATE 10 MG: 5 TABLET ORAL at 01:10

## 2023-10-23 NOTE — HPI
Patient is 88-year-old man with history of hypertension, chronic kidney disease stage III, dyslipidemia, chronic obstructive pulmonary disease, prior tobacco smoker (2 packs per week for 15 years), prior stroke with residual right-sided weakness who walks with walker at baseline who presents to the emergency department with respiratory distress with tachypnea new onset hypoxia.  Patient reports feeling well yesterday.  This morning he felt sudden onset of left-sided flank pain while in bed. He reports associated nausea and vomiting.  No dysuria or frequency.  No constipation or diarrhea.  Last bowel movement yesterday which was normal.  No fevers, chills.  No cough or sputum.  No recent fall.  No recent travel or sick contact.      He lives at home with his wife.  No alcohol or illicit drugs.  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  He reports family history of hypertension.

## 2023-10-23 NOTE — H&P
[facility]  History & Physical    Patient Name: Dwayne Saravia  MRN: [id number]  Patient Class: IP- Inpatient  Admission Date: 10/23/2023  Attending Physician: Tee Kern MD   Primary Care Provider: William Mabry MD         Patient information was obtained from patient, past medical records and ER records.     Subjective:     Principal Problem:Acute hypoxic respiratory failure    Chief Complaint:   Chief Complaint   Patient presents with    Flank Pain     Pt reporting L sided flank pain x 1 day. Pt reporting N/V this morning. 96.5 oral temp per EMS. Pt tachypneic in the 30's with diminished lung sounds in the bases. Pt given 500 of LR with EMS.         HPI: Patient is 88-year-old man with history of hypertension, chronic kidney disease stage III, dyslipidemia, chronic obstructive pulmonary disease, prior tobacco smoker (2 packs per week for 15 years), prior stroke with residual right-sided weakness who walks with walker at baseline who presents to the emergency department with respiratory distress with tachypnea new onset hypoxia.  Patient reports feeling well yesterday.  This morning he felt sudden onset of left-sided flank pain while in bed. He reports associated nausea and vomiting.  No dysuria or frequency.  No constipation or diarrhea.  Last bowel movement yesterday which was normal.  No fevers, chills.  No cough or sputum.  No recent fall.  No recent travel or sick contact.      He lives at home with his wife.  No alcohol or illicit drugs.  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  He reports family history of hypertension.        Past Medical History:   Diagnosis Date    Arthritis     BPH (benign prostatic hyperplasia)     CVA (cerebral vascular accident) 2007    Hypertension        Past Surgical History:   Procedure Laterality Date    PROSTATE SURGERY      2016       Review of patient's allergies indicates:    Allergen Reactions    Keflex [cephalexin]        No current facility-administered medications on file prior to encounter.     Current Outpatient Medications on File Prior to Encounter   Medication Sig    aspirin 81 MG Chew Take 81 mg by mouth once daily.    diclofenac sodium (VOLTAREN) 1 % Gel Apply 2 g topically once daily.    losartan-hydrochlorothiazide 100-12.5 mg (HYZAAR) 100-12.5 mg Tab TAKE 1 TABLET EVERY DAY    naproxen (EC-NAPROSYN) 375 MG TbEC EC tablet Take 1 tablet (375 mg total) by mouth 2 (two) times daily with meals.    simvastatin (ZOCOR) 40 MG tablet TAKE 1 TABLET EVERY DAY    vitamin D (VITAMIN D3) 1000 units Tab Take 1,000 Units by mouth.    amLODIPine (NORVASC) 10 MG tablet Take 1 tablet by mouth once daily.    amLODIPine (NORVASC) 5 MG tablet 1 tablet by mouth once a day.    atorvastatin (LIPITOR) 40 MG tablet Take 40 mg by mouth once daily.    carvediloL (COREG) 6.25 MG tablet Take 6.25 mg by mouth 2 (two) times daily.    hydroCHLOROthiazide (HYDRODIURIL) 25 MG tablet Take 25 mg by mouth once daily.    levocetirizine (XYZAL) 5 MG tablet Take 5 mg by mouth every evening.    losartan (COZAAR) 50 MG tablet Take 50 mg by mouth 2 (two) times daily.    NIFEdipine (ADALAT CC) 60 MG TbSR Take 60 mg by mouth once daily.    nifedipine (PROCARDIA-XL) 60 MG (OSM) 24 hr tablet Take 60 mg by mouth once daily.    omega-3 acid ethyl esters (LOVAZA) 1 gram capsule Take 2 g by mouth.    simvastatin (ZOCOR) 40 MG tablet Take 40 mg by mouth once daily.     Family History       Problem Relation (Age of Onset)    Cancer Brother    Hypertension Brother          Tobacco Use    Smoking status: Former     Current packs/day: 0.00     Types: Cigarettes     Start date: 1950     Quit date: 2018     Years since quittin.7    Smokeless tobacco: Never   Substance and Sexual Activity    Alcohol use: No    Drug use: No    Sexual activity: Not Currently     Review of Systems   Constitutional:   Negative for chills, diaphoresis, fatigue and fever.   HENT:  Negative for congestion, ear pain, hearing loss and tinnitus.    Eyes:  Negative for photophobia, pain, discharge and visual disturbance.   Respiratory:  Positive for shortness of breath. Negative for chest tightness and wheezing.    Cardiovascular:  Negative for chest pain, palpitations and leg swelling.   Gastrointestinal:  Negative for abdominal distention, abdominal pain, blood in stool, constipation, diarrhea, nausea and vomiting.   Endocrine: Negative for cold intolerance, heat intolerance, polydipsia, polyphagia and polyuria.   Genitourinary:  Positive for flank pain. Negative for dysuria, frequency, hematuria and urgency.   Musculoskeletal:  Negative for arthralgias, back pain, gait problem, myalgias and neck pain.   Skin:  Negative for color change, pallor, rash and wound.   Allergic/Immunologic: Negative for environmental allergies, food allergies and immunocompromised state.   Neurological:  Negative for seizures, syncope, facial asymmetry, weakness and headaches.   Psychiatric/Behavioral:  Negative for agitation, behavioral problems, confusion and hallucinations.      Objective:     Vital Signs (Most Recent):  Temp: 98 °F (36.7 °C) (10/23/23 1117)  Pulse: 62 (10/23/23 1611)  Resp: (!) 29 (10/23/23 1611)  BP: (!) 176/103 (10/23/23 1611)  SpO2: 97 % (10/23/23 1611) Vital Signs (24h Range):  Temp:  [98 °F (36.7 °C)] 98 °F (36.7 °C)  Pulse:  [54-62] 62  Resp:  [13-38] 29  SpO2:  [96 %-97 %] 97 %  BP: (176-227)/() 176/103        There is no height or weight on file to calculate BMI.     Physical Exam  Constitutional:       General: He is in acute distress.      Appearance: He is not diaphoretic.   HENT:      Head: Normocephalic and atraumatic.      Nose: Nose normal.      Mouth/Throat:      Pharynx: No oropharyngeal exudate.   Eyes:      General: No scleral icterus.        Right eye: No discharge.         Left eye: No discharge.       Conjunctiva/sclera: Conjunctivae normal.      Pupils: Pupils are equal, round, and reactive to light.   Neck:      Thyroid: No thyromegaly.      Vascular: No JVD.      Trachea: No tracheal deviation.   Cardiovascular:      Rate and Rhythm: Normal rate and regular rhythm.      Heart sounds: Normal heart sounds. No murmur heard.     No friction rub. No gallop.   Pulmonary:      Effort: Respiratory distress present.      Breath sounds: No stridor. Rales present. No wheezing.      Comments: Crackles more so on the right base.  Prolonged end expiratory phase.  Chest:      Chest wall: No tenderness.   Abdominal:      General: Bowel sounds are normal. There is no distension.      Palpations: Abdomen is soft. There is no mass.      Tenderness: There is no abdominal tenderness. There is no guarding or rebound.   Musculoskeletal:         General: Swelling present. No tenderness. Normal range of motion.      Cervical back: Normal range of motion and neck supple.      Comments: Mild bilateral lower extremity edema   Lymphadenopathy:      Cervical: No cervical adenopathy.   Skin:     General: Skin is warm and dry.      Coloration: Skin is not pale.      Findings: No erythema or rash.   Neurological:      Mental Status: He is alert and oriented to person, place, and time.      Cranial Nerves: No cranial nerve deficit.      Motor: No abnormal muscle tone.      Coordination: Coordination normal.      Deep Tendon Reflexes: Reflexes are normal and symmetric. Reflexes normal.      Comments: Patient with residual left-sided weakness, mild dysarthria.  At baseline per family at bedside.   Psychiatric:         Behavior: Behavior normal.         Thought Content: Thought content normal.         Judgment: Judgment normal.              CRANIAL NERVES     CN III, IV, VI   Pupils are equal, round, and reactive to light.       Significant Labs: All pertinent labs within the past 24 hours have been reviewed.    Significant Imaging: I have  reviewed all pertinent imaging results/findings within the past 24 hours.    Assessment/Plan:     * Acute hypoxic respiratory failure  Suspect baseline emphysema from prior tobacco smoking use however without prior history of hypoxia.  Oxygen saturation in the mid 80s on room air now.  Suspect mostly due to decompensated heart failure from uncontrolled hypertension with some evidence of crackles on exam and mild elevated BNP.  Very acute onset of symptoms also concerning for possible pulmonary embolism.  Patient just received intravenous contrast for CT of the chest, abdomen, pelvis which did not have acute findings.  Will avoid further contrast at this time. Reviewing CT study with radiologist (Dr. Kimberly Harper).  Study is not a good study to rule out pulmonary embolus.  Denies any prior bleeding history.  Will check D-dimer and bilateral lower extremity ultrasound and empirically treat with heparin infusion for now to cover for possible pulmonary embolism.  Clinical history are not very suggestive of pneumonia.  Mild atelectasis on lung base.  Checking procalcitonin.  Check for flu and COVID.  Initial troponin negative.  Electrocardiogram reviewed and shows new T-wave inversions in lateral leads concerning for myocardial ischemia.  Treat with aspirin, statin, and along with heparin infusion to cover possible acute coronary syndrome and acute pulmonary embolism.  Trend troponins.  Continue supplemental oxygen.    Acute heart failure  Suspect some degree of acute heart failure due to poorly controlled hypertensive heart disease.  Checking echocardiogram.  Some crackles on exam.  Elevated BNP.  Monitor response to diuretic therapy given emergency department.  Continue with diuretics to maintain negative fluid balance.  Consult Cardiology.    Chronic renal disease, stage III  Serum creatinine appears to be at baseline.  Renally dose medications and monitor kidney function.  Avoid additional intravenous contrast  today.    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  Denies any changes in neurological symptoms.  Continue medical therapy.    Gastroesophageal reflux disease without esophagitis  Will treat with proton pump inhibitor therapy.      VTE Risk Mitigation (From admission, onward)         Ordered     heparin 25,000 units in dextrose 5% (100 units/ml) IV bolus from bag - ADDITIONAL PRN BOLUS - 60 units/kg  As needed (PRN)        Question:  Heparin Infusion Adjustment (DO NOT MODIFY ANSWER)  Answer:  [email or web address]\[email or web address]\Images\Pharmacy\HeparinInfusions\heparin HIGH INTENSITY nomogram for [facility] [id number]    10/23/23 1716     heparin 25,000 units in dextrose 5% (100 units/ml) IV bolus from bag - ADDITIONAL PRN BOLUS - 30 units/kg  As needed (PRN)        Question:  Heparin Infusion Adjustment (DO NOT MODIFY ANSWER)  Answer:  \\[email or web address]\epic\Images\Pharmacy\HeparinInfusions\heparin HIGH INTENSITY nomogram for [facility] [id number]    10/23/23 1716     heparin 25,000 units in dextrose 5% (100 units/ml) IV bolus from bag INITIAL BOLUS  Once        Question:  Heparin Infusion Adjustment (DO NOT MODIFY ANSWER)  Answer:  \\[email or web address]\epic\Images\Pharmacy\HeparinInfusions\heparin HIGH INTENSITY nomogram for [facility] [id number]    10/23/23 1716     heparin 25,000 units in dextrose 5% 250 mL (100 units/mL) infusion HIGH INTENSITY nomogram - [facility]  Continuous        Question:  Begin at (units/kg/hr)  Answer:  18    10/23/23 1716     IP VTE HIGH RISK PATIENT  Once         10/23/23 1644              Critical care time spend on evaluation and treatment to stabilize severe organ dysfunction including reviewing pertinent labs and imaging studies, discussing care with consulting physicians and nursing staff.  40 minutes critical care time.    Tee Kern MD  Department of Hospital Medicine  [facility]

## 2023-10-23 NOTE — ED PROVIDER NOTES
Encounter Date: 10/23/2023       History     Chief Complaint   Patient presents with    Flank Pain     Pt reporting L sided flank pain x 1 day. Pt reporting N/V this morning. 96.5 oral temp per EMS. Pt tachypneic in the 30's with diminished lung sounds in the bases. Pt given 500 of LR with EMS.      Dwayne Saravia is a 88 y.o. male with PMH of BPH, stroke with residual right-sided weakness, hypertension, presenting to [facility] [place] ED for flank pain.  Patient states that he has been in his normal health until this morning.  States that he had sudden onset left-sided flank pain.  Reports a distant history of kidney stones but none recently denies any fevers, chills, dysuria, hematuria, chest pain, shortness of breath.  Patient was brought in by EMS who states that patient was tachypneic with decreased breath sounds in the left lower lung zones.  Patient was placed on supplemental oxygen to maintain sats greater than 92%.  Patient currently on 3 L nasal cannula.  Patient does not use oxygen at home.  Patient is significantly hypertensive upon presentation to the ED, states that he did not take his blood pressure medication yesterday but he took 1 of his medications today prior to EMS arriving at his home.        Review of patient's allergies indicates:   Allergen Reactions    Keflex [cephalexin]      Past Medical History:   Diagnosis Date    Arthritis     BPH (benign prostatic hyperplasia)     CVA (cerebral vascular accident)     Hypertension      Past Surgical History:   Procedure Laterality Date    PROSTATE SURGERY      2016     Family History   Problem Relation Age of Onset    Hypertension Brother     Cancer Brother      Social History     Tobacco Use    Smoking status: Former     Current packs/day: 0.00     Types: Cigarettes     Start date: 1950     Quit date: 2018     Years since quittin.7    Smokeless tobacco: Never   Substance Use Topics    Alcohol use: No    Drug use: No     Review of Systems    Constitutional:  Negative for fever.   HENT:  Negative for congestion, rhinorrhea and sore throat.    Eyes:  Negative for visual disturbance.   Respiratory:  Negative for cough and shortness of breath.    Cardiovascular:  Negative for chest pain and leg swelling.   Gastrointestinal:  Negative for abdominal pain, diarrhea, nausea and vomiting.   Genitourinary:  Positive for flank pain. Negative for dysuria and hematuria.   Neurological:  Negative for weakness.       Physical Exam     Initial Vitals [10/23/23 1117]   BP Pulse Resp Temp SpO2   (!) 227/105 (!) 59 (!) 38 98 °F (36.7 °C) 96 %      MAP       --         Physical Exam    Nursing note and vitals reviewed.  Constitutional: He appears well-developed and well-nourished. He is cooperative.  Non-toxic appearance. He appears ill.   HENT:   Head: Normocephalic and atraumatic.   Mouth/Throat: Mucous membranes are normal. Mucous membranes are not dry.   Eyes: Conjunctivae are normal. Pupils are equal, round, and reactive to light.   Neck: Trachea normal and phonation normal.   Cardiovascular:  Normal rate, regular rhythm, normal heart sounds, intact distal pulses and normal pulses.     Exam reveals no gallop, no S3, no S4 and no friction rub.       No murmur heard.  Pulmonary/Chest: Breath sounds normal. Tachypnea noted. No respiratory distress. He has no wheezes. He has no rhonchi. He has no rales.   Abdominal: Abdomen is soft. He exhibits no distension. There is no abdominal tenderness.   No right CVA tenderness.  No left CVA tenderness. There is no rebound.   Musculoskeletal:      Right lower leg: No edema.      Left lower leg: No edema.     Neurological: He is alert.   Skin: Skin is warm, dry and intact. Capillary refill takes less than 2 seconds.   Psychiatric: He has a normal mood and affect. His speech is normal.         ED Course   Procedures  Labs Reviewed   CBC W/ AUTO DIFFERENTIAL - Abnormal; Notable for the following components:       Result Value    RBC  4.57 (*)     MCH 31.1 (*)     RDW 14.6 (*)     Lymph # 0.8 (*)     Gran % 76.7 (*)     Lymph % 15.4 (*)     All other components within normal limits    Narrative:     Release to patient->Immediate   COMPREHENSIVE METABOLIC PANEL - Abnormal; Notable for the following components:    CO2 17 (*)     Creatinine 1.5 (*)     eGFR 45 (*)     All other components within normal limits    Narrative:     Release to patient->Immediate   B-TYPE NATRIURETIC PEPTIDE - Abnormal; Notable for the following components:     (*)     All other components within normal limits    Narrative:     Release to patient->Immediate   ISTAT PROCEDURE - Abnormal; Notable for the following components:    POC PH 7.347 (*)     POC PCO2 46.8 (*)     All other components within normal limits   ISTAT CREATININE - Abnormal; Notable for the following components:    POC Creatinine 1.6 (*)     All other components within normal limits   CULTURE, BLOOD   CULTURE, BLOOD   HIV 1 / 2 ANTIBODY    Narrative:     Release to patient->Immediate   HEPATITIS C ANTIBODY    Narrative:     Release to patient->Immediate   LIPASE    Narrative:     Release to patient->Immediate   LACTIC ACID, PLASMA    Narrative:     Release to patient->Immediate   TROPONIN I    Narrative:     Release to patient->Immediate   URINALYSIS, REFLEX TO URINE CULTURE   TROPONIN I     EKG Readings: (Independently Interpreted)   Initial Reading: No STEMI. Previous EKG: Compared with most recent EKG Rhythm: Normal Sinus Rhythm. Heart Rate: 61. Ectopy: No Ectopy. Conduction: Normal. ST Segment Depression: V4. T Waves Flipped: V6, V5, V4 and I.       Imaging Results              CTA Chest Abdomen Pelvis (Final result)  Result time 10/23/23 13:46:17      Final result by Kimberly Harper MD (10/23/23 13:46:17)                   Impression:      No evidence of thoracic or abdominal aorta dissection.    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    Bilateral kidney lesion, the largest 1 appear to  represent, the smaller ones are too small to be characterized.    Small hiatal hernia.      Electronically signed by: Kimberly Harper MD  Date:    10/23/2023  Time:    13:46               Narrative:    EXAMINATION:  CTA CHEST ABDOMEN PELVIS    CLINICAL HISTORY:  Aortic dissection suspected;flank pain with ekg changes;    TECHNIQUE:  1.25 mm axial images of the chest, thoracic and abdominal aorta were obtained after the administration of 100 cc of Omni 350 IV contrast, coronal and sagittal images reformatted.    COMPARISON:  None    FINDINGS:  Pre contrast images demonstrate no hyperattenuating mural thrombus to suggest an acute dissection.    The thoracic aorta is of normal caliber and demonstrates mild atherosclerotic plaque.  The ascending aorta measures approximately 3.8 cm, the descending thoracic aorta measures 3.5 cm.    The abdominal aorta is aneurysmal measuring 3.1 cm in its infrarenal region.    The celiac trunk, SMA, bilateral renal arteries and KAITLYN are patent.  The bilateral iliac and femoral vessels are patent.    The airways are patent.  The thyroid gland appears normal.  No mediastinal or hilar lymphadenopathy.  The cardiac silhouette is slightly prominent.  Coronary artery calcifications seen.  No pericardial effusion.  The esophagus course normally.    Emphysema noted.  There is mild atelectasis at the bilateral lung basis.  No pleural effusion.  The axillary regions appear normal.    No liver lesions.  The gallbladder is present, no radiopaque stones seen within.  Small hiatal hernia.  The remainder of the stomach appears normal.  The pancreas, spleen and bilateral adrenal glands appear normal.  Bilateral low attenuating kidney lesions, the larger ones are likely cysts, some lesions are too small to be characterized.  No hydronephrosis.  The bilateral ureters appear normal.  No para-aortic lymphadenopathy.  Mild stool retention. No inflammatory changes of the bowel seen, no bowel dilation. The  appendix is normal.  The bladder appears normal. The prostate is mildly enlarged. The inguinal regions appear normal. The osseous structures demonstrate no osseous lesions.                                       X-Ray Chest AP Portable (Final result)  Result time 10/23/23 12:38:43      Final result by Eddie Corbin MD (10/23/23 12:38:43)                   Impression:      Question patchy opacities in the medial lung bases, which could relate to atelectasis, aspiration or pneumonia.      Electronically signed by: Eddie Corbin  Date:    10/23/2023  Time:    12:38               Narrative:    EXAMINATION:  XR CHEST AP PORTABLE    CLINICAL HISTORY:  Tachypnea, not elsewhere classified    TECHNIQUE:  Single frontal view of the chest was performed.    COMPARISON:  Chest radiograph 09/30/2022.    FINDINGS:  Monitoring leads overlie the chest.    Grossly unchanged cardiomediastinal contours, noting enlargement of the cardiac silhouette.    Question patchy opacities in medial lung bases.    No definite pneumothorax or large volume pleural effusion.  Nonspecific increased attenuation in the right paramedian upper mediastinum stable dating back to at least 11/17/2008 radiograph.    No definite acute findings in the visualized abdomen.    Osseous and soft tissue structures without definite acute change.                                       Medications   furosemide injection 40 mg (has no administration in time range)   azithromycin tablet 500 mg (has no administration in time range)   ondansetron injection 4 mg (4 mg Intravenous Given 10/23/23 1128)   morphine injection 4 mg (4 mg Intravenous Given 10/23/23 1128)   iohexoL (OMNIPAQUE 350) injection 100 mL (100 mLs Intravenous Given 10/23/23 1234)   amLODIPine tablet 10 mg (10 mg Oral Given 10/23/23 1349)     Medical Decision Making  88-year-old male with history of hypertension and stroke presenting for left-sided flank pain.  Initially, patient is significantly  hypertensive, tachypneic with new oxygen requirement.  No emergent intervention indicated at this time but patient was placed on 3 L nasal cannula.      Based on patient's significant hypertension, severe left flank pain, new oxygen requirement, EKG changes, concern for aortic pathology.  Will obtain serial troponins, repeat EKG.  Will consider sepsis/bacteremia with possible kidney stones due to history of nephrolithiasis and presenting symptoms.  Patient's new oxygen is concerning mild respiratory failure.  Patient has a known history of COPD which component this.  Will also consider pneumonia which could be causing referred pain to his left flank.    Patient has a pH of 7.34 with a pCO2 of 46 consistent with known COPD.  No acute intervention indicated.  EKG initially showing new T-wave inversion in V3 through V6.  First troponin is within normal limits, will obtain serial tropes.  Patient has mild elevation in BNP.  Chest x-ray is concerning for aspiration versus pneumonia.  In the setting of new oxygen requirement, will treat for community-acquired pneumonia with azithromycin and diurese with Lasix..  Patient has mild elevation creatinine which is consistent with baseline.    Discussed patient's case with Hospital Medicine who agreed to admit patient to their service for further management of aspiration versus pneumonia versus fluid overload.    Amount and/or Complexity of Data Reviewed  External Data Reviewed: labs, radiology, ECG and notes.  Labs: ordered. Decision-making details documented in ED Course.  Radiology: ordered. Decision-making details documented in ED Course.  ECG/medicine tests: ordered and independent interpretation performed.    Risk  Prescription drug management.               ED Course as of 10/23/23 1442   Mon Oct 23, 2023   1200 CBC W/ AUTO DIFFERENTIAL(!)  Within normal limits [ES]   1213 POC PH(!): 7.347  Mild hypercapnic respiratory acidosis which is consistent with known COPD. [ES]    1214 POC PCO2(!): 46.8 [ES]   1214 Troponin I: 0.006 [ES]   1214 Lipase: 21 [ES]   1214 CO2(!): 17 [ES]   1214 Creatinine(!): 1.5 [ES]   1214 Comp. Metabolic Panel(!)  No significant electrolyte abnormalities. [ES]   1214 X-Ray Chest AP Portable  Independent interpretation:  Pulmonary edema. [ES]   1236 BNP(!): 198  Above baseline [ES]   1236 Lactate, Mukund: 1.7 [ES]   1251 CTA Chest Abdomen Pelvis [DM]   1316 X-Ray Chest AP Portable  Question patchy opacities in the medial lung bases, which could relate to atelectasis, aspiration or pneumonia.   [ES]      ED Course User Index  [DM] Vianey Saeed MD  [ES] Марина Ball MD                    Clinical Impression:   Final diagnoses:  [R06.82] Tachypnea  [R94.31] Abnormal EKG  [R79.89] Elevated brain natriuretic peptide (BNP) level (Primary)               Марина Ball MD  Resident  10/23/23 1048

## 2023-10-23 NOTE — ED NOTES
-presents to ED for evaluation of left lateral chest discomfort that started this am, denies fall,trauma,fever, chills.  LOC: The patient is awake, alert and aware of environment with an appropriate affect, the patient is oriented x 3 and speaking appropriately.  APPEARANCE: Patient resting comfortably and in no acute distress, patient is clean and well groomed, patient's clothing is properly fastened.  SKIN: The skin is warm and dry, patient has normal skin turgor and moist mucus membranes, skin intact, no breakdown or brusing noted.  MUSKULOSKELETAL: Patient moving all extremities well, no obvious swelling or deformities noted.  RESPIRATORY: Airway is open and patent, respirations are spontaneous, patient has a normal effort and rate. Breath sounds are clear and equal bilaterally, decreased slightly to LLL  CARDIAC: Normal heart sounds. No peripheral edema.  ABDOMEN: Soft and non tender to palpation, no distention noted. Bowel sounds present.

## 2023-10-23 NOTE — ASSESSMENT & PLAN NOTE
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  Denies any changes in neurological symptoms.  Continue medical therapy.

## 2023-10-23 NOTE — ACP (ADVANCE CARE PLANNING)
Advance Care Planning     Date: 10/23/2023    Today a voluntary meeting took place: bedside    Patient Participation: Patient is able to participate     Code Status: Full Code     Goals of care: The patient endorses that what is most important right now is to focus on extending life as long as possible, even it it means sacrificing quality    Accordingly, we have decided that the best plan to meet the patient's goals includes continuing with treatment    Length of ACP   conversation in minutes: 5 minutes

## 2023-10-23 NOTE — ASSESSMENT & PLAN NOTE
Suspect some degree of acute heart failure due to poorly controlled hypertensive heart disease.  Checking echocardiogram.  Some crackles on exam.  Elevated BNP.  Monitor response to diuretic therapy given emergency department.  Continue with diuretics to maintain negative fluid balance.  Consult Cardiology.

## 2023-10-23 NOTE — ED NOTES
Pt returned from CT via stretcher, incontinent of urine post CT, pt cleaned, linen changed, pad applied and large external male device placed and attached to  bag

## 2023-10-23 NOTE — ASSESSMENT & PLAN NOTE
Serum creatinine appears to be at baseline.  Renally dose medications and monitor kidney function.  Avoid additional intravenous contrast today.

## 2023-10-24 PROBLEM — N17.9 ACUTE KIDNEY INJURY SUPERIMPOSED ON CKD: Status: ACTIVE | Noted: 2019-07-29

## 2023-10-24 PROBLEM — N18.9 ACUTE KIDNEY INJURY SUPERIMPOSED ON CKD: Status: ACTIVE | Noted: 2019-07-29

## 2023-10-24 PROBLEM — I50.9 ACUTE HEART FAILURE: Status: RESOLVED | Noted: 2023-10-23 | Resolved: 2023-10-24

## 2023-10-24 LAB
ACINETOBACTER CALCOACETICUS/BAUMANNII COMPLEX: NOT DETECTED
ANION GAP SERPL CALC-SCNC: 11 MMOL/L (ref 8–16)
AORTIC ROOT ANNULUS: 2.94 CM
APTT PPP: 40.8 SEC (ref 21–32)
APTT PPP: 83.7 SEC (ref 21–32)
APTT PPP: >150 SEC (ref 21–32)
APTT PPP: >150 SEC (ref 21–32)
ASCENDING AORTA: 3.13 CM
AV INDEX (PROSTH): 0.87
AV MEAN GRADIENT: 6 MMHG
AV PEAK GRADIENT: 11 MMHG
AV VALVE AREA BY VELOCITY RATIO: 2.45 CM²
AV VALVE AREA: 2.49 CM²
AV VELOCITY RATIO: 0.86
BACTEROIDES FRAGILIS: NOT DETECTED
BASOPHILS # BLD AUTO: 0.01 K/UL (ref 0–0.2)
BASOPHILS NFR BLD: 0.2 % (ref 0–1.9)
BSA FOR ECHO PROCEDURE: 1.79 M2
BUN SERPL-MCNC: 24 MG/DL (ref 8–23)
CALCIUM SERPL-MCNC: 8.8 MG/DL (ref 8.7–10.5)
CANDIDA ALBICANS: NOT DETECTED
CANDIDA AURIS: NOT DETECTED
CANDIDA GLABRATA: NOT DETECTED
CANDIDA KRUSEI: NOT DETECTED
CANDIDA PARAPSILOSIS: NOT DETECTED
CANDIDA TROPICALIS: NOT DETECTED
CHLORIDE SERPL-SCNC: 107 MMOL/L (ref 95–110)
CO2 SERPL-SCNC: 20 MMOL/L (ref 23–29)
CREAT SERPL-MCNC: 1.9 MG/DL (ref 0.5–1.4)
CRYPTOCOCCUS NEOFORMANS/GATTII: NOT DETECTED
CTX-M GENE: NORMAL
CV ECHO LV RWT: 0.66 CM
DIFFERENTIAL METHOD: ABNORMAL
DOP CALC AO PEAK VEL: 1.67 M/S
DOP CALC AO VTI: 34 CM
DOP CALC LVOT AREA: 2.9 CM2
DOP CALC LVOT DIAMETER: 1.91 CM
DOP CALC LVOT PEAK VEL: 1.43 M/S
DOP CALC LVOT STROKE VOLUME: 84.77 CM3
DOP CALCLVOT PEAK VEL VTI: 29.6 CM
E WAVE DECELERATION TIME: 352.82 MSEC
E/A RATIO: 0.7
E/E' RATIO: 23.14 M/S
ECHO LV POSTERIOR WALL: 1.29 CM (ref 0.6–1.1)
ENTEROBACTER CLOACAE COMPLEX: NOT DETECTED
ENTEROBACTERALES: NOT DETECTED
ENTEROCOCCUS FAECALIS: NOT DETECTED
ENTEROCOCCUS FAECIUM: NOT DETECTED
EOSINOPHIL # BLD AUTO: 0 K/UL (ref 0–0.5)
EOSINOPHIL NFR BLD: 0.4 % (ref 0–8)
ERYTHROCYTE [DISTWIDTH] IN BLOOD BY AUTOMATED COUNT: 14.4 % (ref 11.5–14.5)
ESCHERICHIA COLI: NOT DETECTED
EST. GFR  (NO RACE VARIABLE): 34 ML/MIN/1.73 M^2
ESTIMATED AVG GLUCOSE: 117 MG/DL (ref 68–131)
FRACTIONAL SHORTENING: 43 % (ref 28–44)
GLUCOSE SERPL-MCNC: 90 MG/DL (ref 70–110)
HAEMOPHILUS INFLUENZAE: NOT DETECTED
HBA1C MFR BLD: 5.7 % (ref 4–5.6)
HCT VFR BLD AUTO: 38.5 % (ref 40–54)
HGB BLD-MCNC: 12.4 G/DL (ref 14–18)
IMM GRANULOCYTES # BLD AUTO: 0.02 K/UL (ref 0–0.04)
IMM GRANULOCYTES NFR BLD AUTO: 0.4 % (ref 0–0.5)
IMP GENE: NORMAL
INTERVENTRICULAR SEPTUM: 1.41 CM (ref 0.6–1.1)
IVC DIAMETER: 1.85 CM
IVRT: 188.39 MSEC
KLEBSIELLA AEROGENES: NOT DETECTED
KLEBSIELLA OXYTOCA: NOT DETECTED
KLEBSIELLA PNEUMONIAE GROUP: NOT DETECTED
KPC: NORMAL
LA MAJOR: 4.96 CM
LA MINOR: 5.35 CM
LA WIDTH: 3.4 CM
LAAS-AP2: 17 CM2
LAAS-AP4: 17 CM2
LEFT ATRIUM SIZE: 2.61 CM
LEFT ATRIUM VOLUME INDEX: 21.7 ML/M2
LEFT ATRIUM VOLUME: 38.83 CM3
LEFT INTERNAL DIMENSION IN SYSTOLE: 2.25 CM (ref 2.1–4)
LEFT VENTRICLE DIASTOLIC VOLUME INDEX: 37.31 ML/M2
LEFT VENTRICLE DIASTOLIC VOLUME: 66.79 ML
LEFT VENTRICLE MASS INDEX: 107 G/M2
LEFT VENTRICLE SYSTOLIC VOLUME INDEX: 9.6 ML/M2
LEFT VENTRICLE SYSTOLIC VOLUME: 17.17 ML
LEFT VENTRICULAR INTERNAL DIMENSION IN DIASTOLE: 3.92 CM (ref 3.5–6)
LEFT VENTRICULAR MASS: 191.86 G
LISTERIA MONOCYTOGENES: NOT DETECTED
LV LATERAL E/E' RATIO: 27 M/S
LV SEPTAL E/E' RATIO: 20.25 M/S
LVOT MG: 4 MMHG
LVOT MV: 0.92 CM/S
LYMPHOCYTES # BLD AUTO: 1.1 K/UL (ref 1–4.8)
LYMPHOCYTES NFR BLD: 20.5 % (ref 18–48)
MAGNESIUM SERPL-MCNC: 2.1 MG/DL (ref 1.6–2.6)
MCH RBC QN AUTO: 31 PG (ref 27–31)
MCHC RBC AUTO-ENTMCNC: 32.2 G/DL (ref 32–36)
MCR-1: NORMAL
MCV RBC AUTO: 96 FL (ref 82–98)
MEC A/C AND MREJ (MRSA): NORMAL
MEC A/C: NORMAL
MONOCYTES # BLD AUTO: 0.8 K/UL (ref 0.3–1)
MONOCYTES NFR BLD: 14.5 % (ref 4–15)
MV PEAK A VEL: 1.15 M/S
MV PEAK E VEL: 0.81 M/S
MV STENOSIS PRESSURE HALF TIME: 102.32 MS
MV VALVE AREA P 1/2 METHOD: 2.15 CM2
NDM GENE: NORMAL
NEISSERIA MENINGITIDIS: NOT DETECTED
NEUTROPHILS # BLD AUTO: 3.4 K/UL (ref 1.8–7.7)
NEUTROPHILS NFR BLD: 64 % (ref 38–73)
NRBC BLD-RTO: 0 /100 WBC
OXA-48-LIKE: NORMAL
PHOSPHATE SERPL-MCNC: 4 MG/DL (ref 2.7–4.5)
PISA MRMAX VEL: 0.03 M/S
PISA TR MAX VEL: 2.91 M/S
PLATELET # BLD AUTO: 218 K/UL (ref 150–450)
PMV BLD AUTO: 11.2 FL (ref 9.2–12.9)
POTASSIUM SERPL-SCNC: 4.7 MMOL/L (ref 3.5–5.1)
PROTEUS SPECIES: NOT DETECTED
PSEUDOMONAS AERUGINOSA: NOT DETECTED
PV MV: 0.65 M/S
PV PEAK GRADIENT: 4 MMHG
PV PEAK VELOCITY: 0.95 M/S
RA MAJOR: 3.84 CM
RA WIDTH: 3 CM
RBC # BLD AUTO: 4 M/UL (ref 4.6–6.2)
RIGHT VENTRICULAR END-DIASTOLIC DIMENSION: 2.2 CM
RV TISSUE DOPPLER FREE WALL SYSTOLIC VELOCITY 1 (APICAL 4 CHAMBER VIEW): 18.8 CM/S
SALMONELLA SP: NOT DETECTED
SERRATIA MARCESCENS: NOT DETECTED
SODIUM SERPL-SCNC: 138 MMOL/L (ref 136–145)
STAPHYLOCOCCUS AUREUS: NOT DETECTED
STAPHYLOCOCCUS EPIDERMIDIS: NOT DETECTED
STAPHYLOCOCCUS LUGDUNESIS: NOT DETECTED
STAPHYLOCOCCUS SPECIES: NOT DETECTED
STENOTROPHOMONAS MALTOPHILIA: NOT DETECTED
STJ: 3.02 CM
STREPTOCOCCUS AGALACTIAE: NOT DETECTED
STREPTOCOCCUS PNEUMONIAE: NOT DETECTED
STREPTOCOCCUS PYOGENES: NOT DETECTED
STREPTOCOCCUS SPECIES: NOT DETECTED
TDI LATERAL: 0.03 M/S
TDI SEPTAL: 0.04 M/S
TDI: 0.04 M/S
TR MAX PG: 34 MMHG
TRICUSPID ANNULAR PLANE SYSTOLIC EXCURSION: 1.8 CM
TROPONIN I SERPL DL<=0.01 NG/ML-MCNC: 0.02 NG/ML (ref 0–0.03)
URATE SERPL-MCNC: 7.8 MG/DL (ref 3.4–7)
VAN A/B: NORMAL
VIM GENE: NORMAL
WBC # BLD AUTO: 5.37 K/UL (ref 3.9–12.7)
Z-SCORE OF LEFT VENTRICULAR DIMENSION IN END DIASTOLE: -2.33
Z-SCORE OF LEFT VENTRICULAR DIMENSION IN END SYSTOLE: -2.43

## 2023-10-24 PROCEDURE — 93010 EKG 12-LEAD: ICD-10-PCS | Mod: ,,, | Performed by: INTERNAL MEDICINE

## 2023-10-24 PROCEDURE — 94761 N-INVAS EAR/PLS OXIMETRY MLT: CPT

## 2023-10-24 PROCEDURE — 99222 1ST HOSP IP/OBS MODERATE 55: CPT | Mod: 25,,, | Performed by: INTERNAL MEDICINE

## 2023-10-24 PROCEDURE — 99233 PR SUBSEQUENT HOSPITAL CARE,LEVL III: ICD-10-PCS | Mod: ,,, | Performed by: INTERNAL MEDICINE

## 2023-10-24 PROCEDURE — 85730 THROMBOPLASTIN TIME PARTIAL: CPT | Performed by: INTERNAL MEDICINE

## 2023-10-24 PROCEDURE — 80048 BASIC METABOLIC PNL TOTAL CA: CPT | Performed by: HOSPITALIST

## 2023-10-24 PROCEDURE — 36415 COLL VENOUS BLD VENIPUNCTURE: CPT | Performed by: HOSPITALIST

## 2023-10-24 PROCEDURE — 25000003 PHARM REV CODE 250: Performed by: HOSPITALIST

## 2023-10-24 PROCEDURE — 84550 ASSAY OF BLOOD/URIC ACID: CPT | Performed by: HOSPITALIST

## 2023-10-24 PROCEDURE — 63600175 PHARM REV CODE 636 W HCPCS: Performed by: HOSPITALIST

## 2023-10-24 PROCEDURE — 85730 THROMBOPLASTIN TIME PARTIAL: CPT | Mod: 91 | Performed by: HOSPITALIST

## 2023-10-24 PROCEDURE — 99222 PR INITIAL HOSPITAL CARE,LEVL II: ICD-10-PCS | Mod: 25,,, | Performed by: INTERNAL MEDICINE

## 2023-10-24 PROCEDURE — 84100 ASSAY OF PHOSPHORUS: CPT | Performed by: HOSPITALIST

## 2023-10-24 PROCEDURE — 93005 ELECTROCARDIOGRAM TRACING: CPT

## 2023-10-24 PROCEDURE — 83735 ASSAY OF MAGNESIUM: CPT | Performed by: HOSPITALIST

## 2023-10-24 PROCEDURE — 21400001 HC TELEMETRY ROOM

## 2023-10-24 PROCEDURE — 93010 ELECTROCARDIOGRAM REPORT: CPT | Mod: ,,, | Performed by: INTERNAL MEDICINE

## 2023-10-24 PROCEDURE — 83036 HEMOGLOBIN GLYCOSYLATED A1C: CPT | Performed by: HOSPITALIST

## 2023-10-24 PROCEDURE — C9113 INJ PANTOPRAZOLE SODIUM, VIA: HCPCS | Performed by: HOSPITALIST

## 2023-10-24 PROCEDURE — 85025 COMPLETE CBC W/AUTO DIFF WBC: CPT | Performed by: HOSPITALIST

## 2023-10-24 PROCEDURE — 36415 COLL VENOUS BLD VENIPUNCTURE: CPT | Performed by: INTERNAL MEDICINE

## 2023-10-24 PROCEDURE — 84484 ASSAY OF TROPONIN QUANT: CPT | Performed by: HOSPITALIST

## 2023-10-24 PROCEDURE — 99233 SBSQ HOSP IP/OBS HIGH 50: CPT | Mod: ,,, | Performed by: INTERNAL MEDICINE

## 2023-10-24 RX ADMIN — NIFEDIPINE 30 MG: 30 TABLET, FILM COATED, EXTENDED RELEASE ORAL at 10:10

## 2023-10-24 RX ADMIN — CARVEDILOL 6.25 MG: 6.25 TABLET, FILM COATED ORAL at 08:10

## 2023-10-24 RX ADMIN — ASPIRIN 81 MG CHEWABLE TABLET 81 MG: 81 TABLET CHEWABLE at 10:10

## 2023-10-24 RX ADMIN — ATORVASTATIN CALCIUM 40 MG: 20 TABLET, FILM COATED ORAL at 10:10

## 2023-10-24 RX ADMIN — NIFEDIPINE 30 MG: 30 TABLET, FILM COATED, EXTENDED RELEASE ORAL at 08:10

## 2023-10-24 RX ADMIN — ACETAMINOPHEN 1000 MG: 500 TABLET ORAL at 10:10

## 2023-10-24 RX ADMIN — CARVEDILOL 6.25 MG: 6.25 TABLET, FILM COATED ORAL at 10:10

## 2023-10-24 RX ADMIN — PANTOPRAZOLE SODIUM 40 MG: 40 INJECTION, POWDER, FOR SOLUTION INTRAVENOUS at 10:10

## 2023-10-24 RX ADMIN — FUROSEMIDE 40 MG: 40 TABLET ORAL at 10:10

## 2023-10-24 RX ADMIN — HEPARIN SODIUM 11 UNITS/KG/HR: 10000 INJECTION, SOLUTION INTRAVENOUS at 07:10

## 2023-10-24 NOTE — ASSESSMENT & PLAN NOTE
Per patient and wife, patient had a CVA in the early 2000s and then had subsequent stroke a few years later.  Since that time he has been having residual left-sided hemiparesis.    -Continue ASA/Statin

## 2023-10-24 NOTE — PROGRESS NOTES
[facility] [address] Medicine  Progress Note    Patient Name: Dwayne Saravia  MRN: [id number]  Patient Class: IP- Inpatient   Admission Date: 10/23/2023  Length of Stay: 1 days  Attending Physician: Sanchez Veras MD  Primary Care Provider: William Mabry MD        Subjective:     Principal Problem:Acute hypoxic respiratory failure      HPI:  Patient is 88-year-old man with history of hypertension, chronic kidney disease stage III, dyslipidemia, chronic obstructive pulmonary disease, prior tobacco smoker (2 packs per week for 15 years), prior stroke with residual right-sided weakness who walks with walker at baseline who presents to the emergency department with respiratory distress with tachypnea new onset hypoxia.  Patient reports feeling well yesterday.  This morning he felt sudden onset of left-sided flank pain while in bed. He reports associated nausea and vomiting.  No dysuria or frequency.  No constipation or diarrhea.  Last bowel movement yesterday which was normal.  No fevers, chills.  No cough or sputum.  No recent fall.  No recent travel or sick contact.      He lives at home with his wife.  No alcohol or illicit drugs.  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  He reports family history of hypertension.        Overview/Hospital Course:  See below      Interval History: Patient is awake and alert this morning.  No adverse events reported overnight.  No chest pain or SOB.  Pox stable on O2 2.5L NC.  No cough.  No Fevers.  Feeling better after diuresis.    Review of Systems   Constitutional:  Negative for chills, diaphoresis, fatigue and fever.   HENT:  Negative for congestion, ear pain, hearing loss and tinnitus.    Eyes:  Negative for photophobia, pain, discharge and visual disturbance.   Respiratory:  Positive for shortness of breath (improving). Negative for cough, chest tightness and wheezing.    Cardiovascular:  Negative for  chest pain, palpitations and leg swelling.   Gastrointestinal:  Negative for abdominal distention, blood in stool, constipation, diarrhea, nausea and vomiting.   Endocrine: Negative for cold intolerance, heat intolerance, polydipsia, polyphagia and polyuria.   Genitourinary:  Positive for flank pain (improving). Negative for dysuria, frequency, hematuria and urgency.   Musculoskeletal:  Negative for arthralgias, back pain, gait problem, myalgias and neck pain.   Skin:  Negative for rash and wound.   Allergic/Immunologic: Negative for environmental allergies, food allergies and immunocompromised state.   Neurological:  Negative for seizures, syncope, facial asymmetry, weakness and headaches.   Psychiatric/Behavioral:  Negative for agitation, behavioral problems, confusion and hallucinations.      Objective:     Vital Signs (Most Recent):  Temp: 98 °F (36.7 °C) (10/24/23 1149)  Pulse: 60 (10/24/23 1201)  Resp: 16 (10/24/23 1149)  BP: (!) 153/79 (10/24/23 1149)  SpO2: (!) 94 % (10/24/23 1149) Vital Signs (24h Range):  Temp:  [97.5 °F (36.4 °C)-98.5 °F (36.9 °C)] 98 °F (36.7 °C)  Pulse:  [49-79] 60  Resp:  [13-29] 16  SpO2:  [92 %-97 %] 94 %  BP: (140-210)/() 153/79     Weight: 66.7 kg (147 lb)  Body mass index is 22.35 kg/m².    Intake/Output Summary (Last 24 hours) at 10/24/2023 1305  Last data filed at 10/24/2023 0744  Gross per 24 hour   Intake --   Output 1400 ml   Net -1400 ml         Physical Exam  Constitutional:       General: He is not in acute distress.     Appearance: He is normal weight. He is not ill-appearing, toxic-appearing or diaphoretic.   HENT:      Head: Normocephalic and atraumatic.      Right Ear: External ear normal.      Left Ear: External ear normal.      Nose: Nose normal.      Mouth/Throat:      Mouth: Mucous membranes are moist.      Pharynx: Oropharynx is clear. No oropharyngeal exudate.   Eyes:      General: No scleral icterus.        Right eye: No discharge.         Left eye: No  discharge.      Conjunctiva/sclera: Conjunctivae normal.      Pupils: Pupils are equal, round, and reactive to light.   Neck:      Thyroid: No thyromegaly.      Vascular: No JVD.      Trachea: No tracheal deviation.   Cardiovascular:      Rate and Rhythm: Normal rate and regular rhythm.      Heart sounds: Normal heart sounds. No murmur heard.     No friction rub. No gallop.   Pulmonary:      Effort: Pulmonary effort is normal. No respiratory distress.      Breath sounds: Normal breath sounds. No stridor. No wheezing or rales.   Chest:      Chest wall: No tenderness.   Abdominal:      General: Bowel sounds are normal. There is no distension.      Palpations: Abdomen is soft. There is no mass.      Tenderness: There is no abdominal tenderness. There is no guarding or rebound.   Musculoskeletal:         General: No tenderness. Normal range of motion.      Cervical back: Normal range of motion and neck supple. No rigidity.      Right lower leg: No edema.      Left lower leg: No edema.   Lymphadenopathy:      Cervical: No cervical adenopathy.   Skin:     General: Skin is warm and dry.      Coloration: Skin is not pale.      Findings: No erythema or rash.   Neurological:      Mental Status: He is alert and oriented to person, place, and time.      Cranial Nerves: No cranial nerve deficit.      Motor: No abnormal muscle tone.      Coordination: Coordination normal.      Deep Tendon Reflexes: Reflexes are normal and symmetric. Reflexes normal.      Comments: Patient with residual left-sided weakness, mild dysarthria.  At baseline per family at bedside.   Psychiatric:         Mood and Affect: Mood normal.         Behavior: Behavior normal.         Thought Content: Thought content normal.             Significant Labs: All pertinent labs within the past 24 hours have been reviewed.    Significant Imaging: I have reviewed all pertinent imaging results/findings within the past 24 hours.      Assessment/Plan:      * Acute hypoxic  respiratory failure  Patient with history of Emphysema who presented with acute respiratory distress.  Patient reports sudden onset of left-sided flank pain while in bed and associated nausea and vomiting.  No dysuria or frequency.  Pox in 80s on RA.  CXR on admission with question patchy opacities in the medial lung bases, which could relate to atelectasis, aspiration or pneumonia.  CTA of C/A/P with No evidence of thoracic or abdominal aorta dissection.  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  No reports of PE, but study was not a good study to rule out pulmonary embolus per Radiology.  US of LE Veins with no evidence of lower extremity deep venous thrombosis.  TTE on admission with EF of 65-70% and Grade I DD.  Labs on admission with Trop negative x 3, , Lactate normal, Procal negative, VBG pH of 7.347 and pCO2 of 46.8, D-Dimers 2.53.  Patient started on IV Lasix 40mg and IV Heparin given concern for possible PE.  He was also given a dose of Azithromycin in the ED.  SOB improving with diuresis, but still on 2.5L O2NC with Pox in low 90s.  Creatinine increased from 1.5 to 1.9 this morning.      Plan:  Follow up with Cardiology recommendations  Hold further Lasix given bump in creatinine  Continue IV Heparin until imaging can be done to rule out PE given ongoing hypoxia.  Continue with O2NC to keep Pox >90  Consider Pulmonary consult if no improvement or worsens.    Acute kidney injury superimposed on CKD  Creatinine on admission was 1.5 from a baseline of around 1.5 (2019).  Creatinine bumped to 1.9 after starting IV Lasix and after CTA of C/A/P with contrast.    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  Since  that time he has been having residual left-sided hemiparesis.    -Continue ASA/Statin    Renal hypertension  SBP a little elevated.  Home Meds:  Nifedipine XL 60mg, Carvedilol 6.25mg bid, Losartan 100mg, HCTZ 25mg  Hospital Meds:  Nifedipine XL 30mg bid, Carvedilol 6.25mg bid  -Hold ARB given RICCI  -Continue Nifedipine and Carvedilol  -Monitor and adjust as needed      Gastroesophageal reflux disease without esophagitis  -Continue with PPI        VTE Risk Mitigation (From admission, onward)         Ordered     heparin 25,000 units in dextrose 5% (100 units/ml) IV bolus from bag - ADDITIONAL PRN BOLUS - 60 units/kg  As needed (PRN)        Question:  Heparin Infusion Adjustment (DO NOT MODIFY ANSWER)  Answer:  \\[email or web address]\epic\Images\Pharmacy\HeparinInfusions\heparin HIGH INTENSITY nomogram for [facility] [id number].pdf    10/23/23 1716     heparin 25,000 units in dextrose 5% (100 units/ml) IV bolus from bag - ADDITIONAL PRN BOLUS - 30 units/kg  As needed (PRN)        Question:  Heparin Infusion Adjustment (DO NOT MODIFY ANSWER)  Answer:  [email or web address]\epic\Images\Pharmacy\HeparinInfusions\heparin HIGH INTENSITY nomogram for [facility] [id number].pdf    10/23/23 1716     heparin 25,000 units in dextrose 5% 250 mL (100 units/mL) infusion HIGH INTENSITY nomogram - [facility]  Continuous        Question:  Begin at (units/kg/hr)  Answer:  18    10/23/23 1716     IP VTE HIGH RISK PATIENT  Once         10/23/23 1644                Discharge Planning   HAILY:      Code Status: Full Code   Is the patient medically ready for discharge?:     Reason for patient still in hospital (select all that apply): Patient trending condition, Treatment and Consult recommendations  Discharge Plan A: Home with family                  Sanchez Veras MD  Department of Hospital Medicine   [facility] ([address])

## 2023-10-24 NOTE — ASSESSMENT & PLAN NOTE
Creatinine on admission was 1.5 from a baseline of around 1.5 (2019).  Creatinine bumped to 1.9 after starting IV Lasix and after CTA of C/A/P with contrast.    -Check Renal US  -Hold further Lasix   -Hold ARB  -Check Urine Lytes, Eos  -Check Uric Acid  -Renal dose medications and avoid nephrotoxins  -Consider Nephrology consult

## 2023-10-24 NOTE — NURSING
Nurses Note -- 4 Eyes      10/24/2023   12:29 AM      Skin assessed during: Admit      [x] No Altered Skin Integrity Present    []Prevention Measures Documented      [] Yes- Altered Skin Integrity Present or Discovered   [] LDA Added if Not in Epic (Describe Wound)   [] New Altered Skin Integrity was Present on Admit and Documented in LDA   [] Wound Image Taken    Wound Care Consulted? No    Attending Nurse Smita PLASCENCIA    Second RN/Staff Member:  Karina PLASCENCIA

## 2023-10-24 NOTE — SUBJECTIVE & OBJECTIVE
Interval History: Patient is awake and alert this morning.  No adverse events reported overnight.  No chest pain or SOB.  Pox stable on O2 2.5L NC.  No cough.  No Fevers.  Feeling better after diuresis.    Review of Systems   Constitutional:  Negative for chills, diaphoresis, fatigue and fever.   HENT:  Negative for congestion, ear pain, hearing loss and tinnitus.    Eyes:  Negative for photophobia, pain, discharge and visual disturbance.   Respiratory:  Positive for shortness of breath (improving). Negative for cough, chest tightness and wheezing.    Cardiovascular:  Negative for chest pain, palpitations and leg swelling.   Gastrointestinal:  Negative for abdominal distention, blood in stool, constipation, diarrhea, nausea and vomiting.   Endocrine: Negative for cold intolerance, heat intolerance, polydipsia, polyphagia and polyuria.   Genitourinary:  Positive for flank pain (improving). Negative for dysuria, frequency, hematuria and urgency.   Musculoskeletal:  Negative for arthralgias, back pain, gait problem, myalgias and neck pain.   Skin:  Negative for rash and wound.   Allergic/Immunologic: Negative for environmental allergies, food allergies and immunocompromised state.   Neurological:  Negative for seizures, syncope, facial asymmetry, weakness and headaches.   Psychiatric/Behavioral:  Negative for agitation, behavioral problems, confusion and hallucinations.      Objective:     Vital Signs (Most Recent):  Temp: 98 °F (36.7 °C) (10/24/23 1149)  Pulse: 60 (10/24/23 1201)  Resp: 16 (10/24/23 1149)  BP: (!) 153/79 (10/24/23 1149)  SpO2: (!) 94 % (10/24/23 1149) Vital Signs (24h Range):  Temp:  [97.5 °F (36.4 °C)-98.5 °F (36.9 °C)] 98 °F (36.7 °C)  Pulse:  [49-79] 60  Resp:  [13-29] 16  SpO2:  [92 %-97 %] 94 %  BP: (140-210)/() 153/79     Weight: 66.7 kg (147 lb)  Body mass index is 22.35 kg/m².    Intake/Output Summary (Last 24 hours) at 10/24/2023 1305  Last data filed at 10/24/2023 0711  Gross per 24  hour   Intake --   Output 1400 ml   Net -1400 ml         Physical Exam  Constitutional:       General: He is not in acute distress.     Appearance: He is normal weight. He is not ill-appearing, toxic-appearing or diaphoretic.   HENT:      Head: Normocephalic and atraumatic.      Right Ear: External ear normal.      Left Ear: External ear normal.      Nose: Nose normal.      Mouth/Throat:      Mouth: Mucous membranes are moist.      Pharynx: Oropharynx is clear. No oropharyngeal exudate.   Eyes:      General: No scleral icterus.        Right eye: No discharge.         Left eye: No discharge.      Conjunctiva/sclera: Conjunctivae normal.      Pupils: Pupils are equal, round, and reactive to light.   Neck:      Thyroid: No thyromegaly.      Vascular: No JVD.      Trachea: No tracheal deviation.   Cardiovascular:      Rate and Rhythm: Normal rate and regular rhythm.      Heart sounds: Normal heart sounds. No murmur heard.     No friction rub. No gallop.   Pulmonary:      Effort: Pulmonary effort is normal. No respiratory distress.      Breath sounds: Normal breath sounds. No stridor. No wheezing or rales.   Chest:      Chest wall: No tenderness.   Abdominal:      General: Bowel sounds are normal. There is no distension.      Palpations: Abdomen is soft. There is no mass.      Tenderness: There is no abdominal tenderness. There is no guarding or rebound.   Musculoskeletal:         General: No tenderness. Normal range of motion.      Cervical back: Normal range of motion and neck supple. No rigidity.      Right lower leg: No edema.      Left lower leg: No edema.   Lymphadenopathy:      Cervical: No cervical adenopathy.   Skin:     General: Skin is warm and dry.      Coloration: Skin is not pale.      Findings: No erythema or rash.   Neurological:      Mental Status: He is alert and oriented to person, place, and time.      Cranial Nerves: No cranial nerve deficit.      Motor: No abnormal muscle tone.      Coordination:  Coordination normal.      Deep Tendon Reflexes: Reflexes are normal and symmetric. Reflexes normal.      Comments: Patient with residual left-sided weakness, mild dysarthria.  At baseline per family at bedside.   Psychiatric:         Mood and Affect: Mood normal.         Behavior: Behavior normal.         Thought Content: Thought content normal.             Significant Labs: All pertinent labs within the past 24 hours have been reviewed.    Significant Imaging: I have reviewed all pertinent imaging results/findings within the past 24 hours.

## 2023-10-24 NOTE — ASSESSMENT & PLAN NOTE
Patient with history of Emphysema who presented with acute respiratory distress.  Patient reports sudden onset of left-sided flank pain while in bed and associated nausea and vomiting.  No dysuria or frequency.  Pox in 80s on RA.  CXR on admission with question patchy opacities in the medial lung bases, which could relate to atelectasis, aspiration or pneumonia.  CTA of C/A/P with No evidence of thoracic or abdominal aorta dissection.  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  No reports of PE, but study was not a good study to rule out pulmonary embolus per Radiology.  US of LE Veins with no evidence of lower extremity deep venous thrombosis.  TTE on admission with EF of 65-70% and Grade I DD.  Labs on admission with Trop negative x 3, , Lactate normal, Procal negative, VBG pH of 7.347 and pCO2 of 46.8, D-Dimers 2.53.  Patient started on IV Lasix 40mg and IV Heparin given concern for possible PE.  He was also given a dose of Azithromycin in the ED.  SOB improving with diuresis, but still on 2.5L O2NC with Pox in low 90s.  Creatinine increased from 1.5 to 1.9 this morning.      Plan:  Follow up with Cardiology recommendations  Hold further Lasix given bump in creatinine  Continue IV Heparin until imaging can be done to rule out PE given ongoing hypoxia.  Continue with O2NC to keep Pox >90  Consider Pulmonary consult if no improvement or worsens.

## 2023-10-24 NOTE — CONSULTS
Cardiology Consult  10/24/2023  10:58 AM    Attending Cardiologist: Don Mitchell M.D.  Primary Care Provider: William Mabry MD  Chief Complaint/Reason For Consultation:  L flank pain      Problem list  Patient Active Problem List   Diagnosis    H/O: CVA (cerebrovascular accident)    Gastroesophageal reflux disease without esophagitis    Gait, antalgic    Dysarthria following cerebral infarction    Chronic renal disease, stage III    Benign prostatic hyperplasia without lower urinary tract symptoms    Age-related osteoporosis without current pathological fracture    Acquired cyst of kidney    Other emphysema    Persistent proteinuria    Renal hypertension    Former tobacco use    Encounter for long-term (current) use of other medications    Hemiparesis affecting right side as late effect of stroke    Acute hypoxic respiratory failure    Acute heart failure       CC:  L flank pain    HPI:  Dwayne Saravia is a 88 y.o.year-old male with history of hypertension, chronic kidney disease stage III, dyslipidemia, chronic obstructive pulmonary disease, prior tobacco smoker (2 packs per week for 15 years), prior stroke with residual right-sided weakness who walks with walker at baseline who presents to the emergency department with respiratory distress with tachypnea new onset hypoxia.  Patient reports feeling well yesterday.  This morning he felt sudden onset of left-sided flank pain while in bed. He reports associated nausea and vomiting.   No fever or chills.    No prior cardiac problems.  He has no complaints today.  Cardiology was consulted for left flank pain   And shortness of breath.  His care was discussed with Dr Kern yesterday who was  concern for pulmonary embolism and IV heparin was started empirically since CTA was inconclusive.    Medications  Current Facility-Administered Medications   Medication Dose Route Frequency Provider Last Rate Last Admin    acetaminophen tablet 1,000 mg  1,000 mg Oral  Q8H PRN Tee Kern MD   1,000 mg at 10/24/23 1049    aspirin chewable tablet 81 mg  81 mg Oral Daily Tee Kern MD   81 mg at 10/24/23 1000    atorvastatin tablet 40 mg  40 mg Oral Daily Tee Kern MD   40 mg at 10/24/23 1049    carvediloL tablet 6.25 mg  6.25 mg Oral BID Tee Kern MD   6.25 mg at 10/24/23 1000    furosemide tablet 40 mg  40 mg Oral Daily Tee Kern MD   40 mg at 10/24/23 1000    heparin 25,000 units in dextrose 5% (100 units/ml) IV bolus from bag - ADDITIONAL PRN BOLUS - 30 units/kg  30 Units/kg (Adjusted) Intravenous PRN Tee Kern MD        heparin 25,000 units in dextrose 5% (100 units/ml) IV bolus from bag - ADDITIONAL PRN BOLUS - 60 units/kg  60 Units/kg (Adjusted) Intravenous PRN Tee Kern MD        heparin 25,000 units in dextrose 5% 250 mL (100 units/mL) infusion HIGH INTENSITY nomogram - [facility]  0-40 Units/kg/hr (Adjusted) Intravenous Continuous Tee Kern MD 9.7 mL/hr at 10/24/23 0410 14 Units/kg/hr at 10/24/23 0410    morphine injection 2 mg  2 mg Intravenous Q4H PRN Tee Kern MD        NIFEdipine 24 hr tablet 30 mg  30 mg Oral BID Tee Kern MD   30 mg at 10/24/23 1000    pantoprazole injection 40 mg  40 mg Intravenous Daily Tee Kern MD   40 mg at 10/24/23 1052    sodium chloride 0.9% flush 10 mL  10 mL Intravenous PRN Tee Kern MD          Prior to Admission medications    Medication Sig Start Date End Date Taking? Authorizing Provider   aspirin 81 MG Chew Take 81 mg by mouth once daily.   Yes Provider, Historical   diclofenac sodium (VOLTAREN) 1 % Gel Apply 2 g topically once daily. 8/28/20  Yes William Mabry MD   losartan-hydrochlorothiazide 100-12.5 mg (HYZAAR) 100-12.5 mg Tab TAKE 1 TABLET EVERY DAY 10/18/21  Yes William Mabry MD   naproxen (EC-NAPROSYN) 375 MG TbEC EC tablet Take 1 tablet (375 mg total) by mouth 2 (two) times daily with meals. 10/29/22  Yes Puneet Telles,  MD   simvastatin (ZOCOR) 40 MG tablet TAKE 1 TABLET EVERY DAY 10/18/21  Yes William Mabry MD   vitamin D (VITAMIN D3) 1000 units Tab Take 1,000 Units by mouth.   Yes Provider, Historical   amLODIPine (NORVASC) 10 MG tablet Take 1 tablet by mouth once daily. 10/7/22      amLODIPine (NORVASC) 5 MG tablet 1 tablet by mouth once a day. 22      atorvastatin (LIPITOR) 40 MG tablet Take 40 mg by mouth once daily. 23   Provider, Historical   carvediloL (COREG) 6.25 MG tablet Take 6.25 mg by mouth 2 (two) times daily. 23   Provider, Historical   hydroCHLOROthiazide (HYDRODIURIL) 25 MG tablet Take 25 mg by mouth once daily. 23   Provider, Historical   levocetirizine (XYZAL) 5 MG tablet Take 5 mg by mouth every evening.    Provider, Historical   losartan (COZAAR) 50 MG tablet Take 50 mg by mouth 2 (two) times daily. 23   Provider, Historical   NIFEdipine (ADALAT CC) 60 MG TbSR Take 60 mg by mouth once daily.    Provider, Historical   nifedipine (PROCARDIA-XL) 60 MG (OSM) 24 hr tablet Take 60 mg by mouth once daily.    Provider, Historical   omega-3 acid ethyl esters (LOVAZA) 1 gram capsule Take 2 g by mouth.    Provider, Historical   simvastatin (ZOCOR) 40 MG tablet Take 40 mg by mouth once daily.    Provider, Historical         History  Past Medical History:   Diagnosis Date    Arthritis     BPH (benign prostatic hyperplasia)     CVA (cerebral vascular accident)     Hypertension      Past Surgical History:   Procedure Laterality Date    PROSTATE SURGERY      2016     Social History     Socioeconomic History    Marital status:    Occupational History    Occupation: [employer]     Comment: PerformLine Work   Tobacco Use    Smoking status: Former     Current packs/day: 0.00     Types: Cigarettes     Start date: 1950     Quit date: 2018     Years since quittin.7    Smokeless tobacco: Never   Substance and Sexual Activity    Alcohol use: No    Drug use: No    Sexual activity:  Not Currently     Social Determinants of Health     Financial Resource Strain: Low Risk  (8/20/2020)    Overall Financial Resource Strain (CARDIA)     Difficulty of Paying Living Expenses: Not hard at all   Food Insecurity: No Food Insecurity (8/20/2020)    Hunger Vital Sign     Worried About Running Out of Food in the Last Year: Never true     Ran Out of Food in the Last Year: Never true   Transportation Needs: No Transportation Needs (8/20/2020)    PRAPARE - Transportation     Lack of Transportation (Medical): No     Lack of Transportation (Non-Medical): No   Physical Activity: Insufficiently Active (8/20/2020)    Exercise Vital Sign     Days of Exercise per Week: 2 days     Minutes of Exercise per Session: 30 min   Stress: No Stress Concern Present (8/20/2020)    [place] [employer] - Occupational Stress Questionnaire     Feeling of Stress : Not at all   Social Connections: Moderately Isolated (8/20/2020)    Social Connection and Isolation Panel [NHANES]     Frequency of Communication with Friends and Family: Once a week     Frequency of Social Gatherings with Friends and Family: Once a week     Attends [employer] Services: 1 to 4 times per year     Active Member of Clubs or Organizations: No     Attends Club or Organization Meetings: Never     Marital Status:          Allergies  Review of patient's allergies indicates:   Allergen Reactions    Keflex [cephalexin]          Review of Systems   Review of Systems   Cardiovascular: Negative.    Respiratory:  Positive for shortness of breath.          Physical Exam  Wt Readings from Last 1 Encounters:   10/24/23 66.7 kg (147 lb)     BP Readings from Last 3 Encounters:   10/24/23 (!) 152/71   10/29/22 137/86   05/28/21 124/78     Pulse Readings from Last 1 Encounters:   10/24/23 (!) 59     Body mass index is 22.35 kg/m².    Physical Exam  Vitals reviewed.   Neck:      Vascular: No JVD.   Cardiovascular:      Rate and Rhythm: Normal rate and  regular rhythm.      Pulses:           Carotid pulses are 2+ on the right side and 2+ on the left side.       Radial pulses are 2+ on the right side and 2+ on the left side.      Heart sounds: S1 normal and S2 normal.   Pulmonary:      Breath sounds: Normal breath sounds and air entry.   Musculoskeletal:      Right lower leg: No edema.      Left lower leg: No edema.   Neurological:      Mental Status: He is alert.           Laboratory:  Trended Lab Data:  Recent Labs   Lab 10/23/23  1126 10/24/23  0608   WBC 4.95 5.37   HGB 14.2 12.4*   HCT 44.4 38.5*    218       Recent Labs   Lab 10/23/23  1126 10/24/23  0608    138   K 4.5 4.7    107   CO2 17* 20*   BUN 19 24*   GLU 83 90   CALCIUM 9.1 8.8   MG  --  2.1   PHOS  --  4.0       Recent Labs   Lab 10/23/23  1126   PROT 7.8   ALBUMIN 3.7   BILITOT 0.6   AST 23   ALT 14   ALKPHOS 107       Recent Labs   Lab 10/23/23  1724   INR 1.0       Cardiac:   Recent Labs   Lab 10/23/23  1154 10/23/23  1539 10/23/23  1722 10/24/23  0608   TROPONINI  --  0.007 <0.006 0.018   *  --   --   --        FLP:   Lab Results   Component Value Date    CHOL 122 05/10/2021    HDL 41 05/10/2021    LDLCALC 61.6 (L) 05/10/2021    TRIG 97 05/10/2021    CHOLHDL 33.6 05/10/2021     DM:   Lab Results   Component Value Date    HGBA1C 6.1 (H) 08/28/2020    LDLCALC 61.6 (L) 05/10/2021    CREATININE 1.9 (H) 10/24/2023     Thyroid:   Lab Results   Component Value Date    TSH 1.354 08/28/2020     Anemia:   Lab Results   Component Value Date    IRON 101 11/20/2008    TIBC 291 11/20/2008    FERRITIN 462 (H) 11/20/2008    URXOBVOL71 698 11/20/2008    FOLATE 16.2 11/20/2008     Urinalysis:   Lab Results   Component Value Date    LABURIN NO GROWTH AFTER 48 HOURS. 11/19/2008    COLORU Yellow 10/23/2023    SPECGRAV >1.030 (A) 10/23/2023    NITRITE Negative 10/23/2023    KETONESU Negative 10/23/2023    UROBILINOGEN Negative 10/23/2023     @    Other Results:  EKG (my  interpretation):    TELEMETRY:    Sinus    Echo:   Results for orders placed or performed during the hospital encounter of 10/23/23   Echo   Result Value Ref Range    BSA 1.79 m2    LA WIDTH 3.4 cm    Left Atrium Area-systolic Apical Two Chamber 17.0 cm2    Left Atrium Area-systolic Four Chamber 17.0 cm2    TAPSE 1.80 cm    RA Width 3.0 cm    LVOT stroke volume 84.77 cm3    LVIDd 3.92 3.5 - 6.0 cm    LV Systolic Volume 17.17 mL    LV Systolic Volume Index 9.6 mL/m2    LVIDs 2.25 2.1 - 4.0 cm    LV Diastolic Volume 66.79 mL    LV Diastolic Volume Index 37.31 mL/m2    IVS 1.41 (A) 0.6 - 1.1 cm    LVOT diameter 1.91 cm    LVOT area 2.9 cm2    FS 43 28 - 44 %    Left Ventricle Relative Wall Thickness 0.66 cm    Posterior Wall 1.29 (A) 0.6 - 1.1 cm    LV mass 191.86 g    LV Mass Index 107 g/m2    MV Peak E Brody 0.81 m/s    TDI LATERAL 0.03 m/s    TDI SEPTAL 0.04 m/s    E/E' ratio 23.14 m/s    MV Peak A Brody 1.15 m/s    TR Max Brody 2.91 m/s    E/A ratio 0.70     IVRT 188.39 msec    E wave deceleration time 352.82 msec    LV SEPTAL E/E' RATIO 20.25 m/s    LA Volume Index 21.7 mL/m2    LV LATERAL E/E' RATIO 27.00 m/s    LA volume 38.83 cm3    LVOT peak brody 1.43 m/s    Left Ventricular Outflow Tract Mean Velocity 0.92 cm/s    Left Ventricular Outflow Tract Mean Gradient 4.00 mmHg    LA size 2.61 cm    Left Atrium Minor Axis 5.35 cm    Left Atrium Major Axis 4.96 cm    RVDD 2.20 cm    RV S' 18.80 cm/s    RA Major Axis 3.84 cm    AV mean gradient 6 mmHg    AV peak gradient 11 mmHg    Ao peak brody 1.67 m/s    Ao VTI 34.00 cm    LVOT peak VTI 29.60 cm    AV valve area 2.49 cm²    AV Velocity Ratio 0.86     AV index (prosthetic) 0.87     BENNIE by Velocity Ratio 2.45 cm²    Mr max brody 0.03 m/s    MV stenosis pressure 1/2 time 102.32 ms    MV valve area p 1/2 method 2.15 cm2    Triscuspid Valve Regurgitation Peak Gradient 34 mmHg    PV PEAK VELOCITY 0.95 m/s    PV peak gradient 4 mmHg    Pulmonary Valve Mean Velocity 0.65 m/s    Ao root  annulus 2.94 cm    STJ 3.02 cm    Ascending aorta 3.13 cm    IVC diameter 1.85 cm    Mean e' 0.04 m/s    ZLVIDS -2.43     ZLVIDD -2.33     Narrative      Left Ventricle: The left ventricle is normal in size. Moderately   increased wall thickness. Normal wall motion. There is normal systolic   function with a visually estimated ejection fraction of 65 - 70%. Grade I   diastolic dysfunction.    Right Ventricle: Normal right ventricular cavity size. Systolic   function is normal.    Aortic Valve: There is mild aortic valve sclerosis.    Mitral Valve: There is no stenosis. There is no significant   regurgitation.    Tricuspid Valve: There is normal leaflet mobility. There is no   significant regurgitation.         Radiology:  Echo    Result Date: 10/24/2023    Left Ventricle: The left ventricle is normal in size. Moderately increased wall thickness. Normal wall motion. There is normal systolic function with a visually estimated ejection fraction of 65 - 70%. Grade I diastolic dysfunction.   Right Ventricle: Normal right ventricular cavity size. Systolic function is normal.   Aortic Valve: There is mild aortic valve sclerosis.   Mitral Valve: There is no stenosis. There is no significant regurgitation.   Tricuspid Valve: There is normal leaflet mobility. There is no significant regurgitation.     US Lower Extremity Veins Bilateral    Result Date: 10/23/2023  EXAMINATION: US LOWER EXTREMITY VEINS BILATERAL CLINICAL HISTORY: Rule out DVT; TECHNIQUE: Duplex and color flow Doppler evaluation of the bilateral lower extremity veins was performed. COMPARISON: None FINDINGS: No evidence of clot involving the bilateral common femoral, greater saphenous, femoral, popliteal, peroneal, anterior and posterior tibial veins.  All venous structures demonstrate normal respiratory phasicity and augment adequately.  No evidence of soft tissue mass or Baker's cyst.     No evidence of lower extremity deep venous thrombosis. Electronically  signed by: Dago Simms MD Date:    10/23/2023 Time:    17:53    CTA Chest Abdomen Pelvis    Result Date: 10/23/2023  EXAMINATION: CTA CHEST ABDOMEN PELVIS CLINICAL HISTORY: Aortic dissection suspected;flank pain with ekg changes; TECHNIQUE: 1.25 mm axial images of the chest, thoracic and abdominal aorta were obtained after the administration of 100 cc of Omni 350 IV contrast, coronal and sagittal images reformatted. COMPARISON: None FINDINGS: Pre contrast images demonstrate no hyperattenuating mural thrombus to suggest an acute dissection. The thoracic aorta is of normal caliber and demonstrates mild atherosclerotic plaque.  The ascending aorta measures approximately 3.8 cm, the descending thoracic aorta measures 3.5 cm. The abdominal aorta is aneurysmal measuring 3.1 cm in its infrarenal region. The celiac trunk, SMA, bilateral renal arteries and KAITLYN are patent.  The bilateral iliac and femoral vessels are patent. The airways are patent.  The thyroid gland appears normal.  No mediastinal or hilar lymphadenopathy.  The cardiac silhouette is slightly prominent.  Coronary artery calcifications seen.  No pericardial effusion.  The esophagus course normally. Emphysema noted.  There is mild atelectasis at the bilateral lung basis.  No pleural effusion.  The axillary regions appear normal. No liver lesions.  The gallbladder is present, no radiopaque stones seen within.  Small hiatal hernia.  The remainder of the stomach appears normal.  The pancreas, spleen and bilateral adrenal glands appear normal.  Bilateral low attenuating kidney lesions, the larger ones are likely cysts, some lesions are too small to be characterized.  No hydronephrosis.  The bilateral ureters appear normal.  No para-aortic lymphadenopathy.  Mild stool retention. No inflammatory changes of the bowel seen, no bowel dilation. The appendix is normal.  The bladder appears normal. The prostate is mildly enlarged. The inguinal regions appear normal. The  osseous structures demonstrate no osseous lesions.     No evidence of thoracic or abdominal aorta dissection. 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 Bilateral kidney lesion, the largest 1 appear to represent, the smaller ones are too small to be characterized. Small hiatal hernia. Electronically signed by: Kimberly Harper MD Date:    10/23/2023 Time:    13:46    X-Ray Chest AP Portable    Result Date: 10/23/2023  EXAMINATION: XR CHEST AP PORTABLE CLINICAL HISTORY: Tachypnea, not elsewhere classified TECHNIQUE: Single frontal view of the chest was performed. COMPARISON: Chest radiograph 09/30/2022. FINDINGS: Monitoring leads overlie the chest. Grossly unchanged cardiomediastinal contours, noting enlargement of the cardiac silhouette. Question patchy opacities in medial lung bases. No definite pneumothorax or large volume pleural effusion.  Nonspecific increased attenuation in the right paramedian upper mediastinum stable dating back to at least 11/17/2008 radiograph. No definite acute findings in the visualized abdomen. Osseous and soft tissue structures without definite acute change.     Question patchy opacities in the medial lung bases, which could relate to atelectasis, aspiration or pneumonia. Electronically signed by: Eddie Corbin Date:    10/23/2023 Time:    12:38        Current Medications:     Infusions:   heparin (porcine) in D5W 14 Units/kg/hr (10/24/23 0410)        Scheduled:   aspirin  81 mg Oral Daily    atorvastatin  40 mg Oral Daily    carvediloL  6.25 mg Oral BID    furosemide  40 mg Oral Daily    NIFEdipine  30 mg Oral BID    pantoprazole  40 mg Intravenous Daily        PRN:  acetaminophen, heparin (PORCINE), heparin (PORCINE), morphine, sodium chloride 0.9%      Assessment and Plan:  Acute hypoxic respiratory failure  Mild acute diastoic CHF responding to diuretics.  Negative 1L.  No ACS.  T waves inversion likely from LVH.  Echo resulted.    Continue oral Lasix 40 mg daily    HTN, stable    Continue meds and monitor    H/o CVA    CKD 3   As per hospital medicine team        Thank you for allowing me to participate in the care of Dwayne Saravia.      Don Mitchell MD, F.A.C.C, F.S.C.A.I.    Disclaimer: This document was created using voice recognition software ([employer]). Although it may be edited, this document may contain errors related to incorrect recognition of the spoken word. Please call the physician if clarification is needed.

## 2023-10-24 NOTE — ASSESSMENT & PLAN NOTE
SBP a little elevated.  Home Meds:  Nifedipine XL 60mg, Carvedilol 6.25mg bid, Losartan 100mg, HCTZ 25mg  Hospital Meds:  Nifedipine XL 30mg bid, Carvedilol 6.25mg bid  -Hold ARB given RICCI  -Continue Nifedipine and Carvedilol  -Monitor and adjust as needed

## 2023-10-24 NOTE — PLAN OF CARE
MSW met with the patient at the bedside.     Patient is alert and oriented with no communication barriers.     Prior to admission, the patient is independent. Patient reported the use of DME. ([employer])    Patients PCP is correct on the face sheet. Patient choice pharmacy is bedside. ([facility])    The patient denies having written advance directives.      Patients' family will transport the patient home at discharge.     No CM needs to be identified at this time.     CM team will continue to follow.      10/24/23 0857   Discharge Assessment   Assessment Type Discharge Planning Assessment   Confirmed/corrected address, phone number and insurance Yes   Source of Information patient;health record   People in Home significant other   Do you expect to return to your current living situation? Yes   Prior to hospitilization cognitive status: Alert/Oriented   Current cognitive status: Alert/Oriented   Walking or Climbing Stairs ambulation difficulty, requires equipment   Equipment Currently Used at Home rollator   Readmission within 30 days? No   Patient currently being followed by outpatient case management? No   Do you currently have service(s) that help you manage your care at home? No   Do you take prescription medications? Yes   Do you have prescription coverage? Yes   Do you have any problems affording any of your prescribed medications? No   Is the patient taking medications as prescribed? yes   How do you get to doctors appointments? family or friend will provide   Are you on dialysis? No   Do you take coumadin? No   DME Needed Upon Discharge  none   Discharge Plan discussed with: Patient   Transition of Care Barriers None   Discharge Plan A Home with family   Discharge Plan B Home Health

## 2023-10-24 NOTE — CONSULTS
Food & Nutrition  Education    Diet Education: fluid and sodium restriction  Time Spent: 15 min  Learners: pt      Nutrition Education provided with handouts:   Heart Failure Nutrition Therapy    Comments:  Pt getting blood drawn at time of education. Pt reported good appetite, ate 75% intake of breakfast this am: grits, sausage, orange juice. Did c/o of L flank stomach pain. No other GI intolerance reported to RD. Pt states wife prepares meals and they follow a low salt diet at home. Reminded pt and reinforced to continue to follow a low sodium diet while at home. Encouraged an increase in whole foods of fruits, vegetables, whole grains. Pt currently on 1500 mL fluid restriction and discussed weighing self daily to monitor for s/s of fluid buildup. Provided pt with handout and pt voiced understanding.     All questions and concerns answered. Dietitian's contact information provided.       Follow-Up: yes    Please Re-consult as needed    Thanks!    Jocelyn Fletcher, BRANDONN, LDN

## 2023-10-25 LAB
ANION GAP SERPL CALC-SCNC: 11 MMOL/L (ref 8–16)
APTT PPP: 41.9 SEC (ref 21–32)
APTT PPP: 48.3 SEC (ref 21–32)
APTT PPP: 77.2 SEC (ref 21–32)
BASOPHILS # BLD AUTO: 0 K/UL (ref 0–0.2)
BASOPHILS NFR BLD: 0 % (ref 0–1.9)
BUN SERPL-MCNC: 30 MG/DL (ref 8–23)
CALCIUM SERPL-MCNC: 8.9 MG/DL (ref 8.7–10.5)
CHLORIDE SERPL-SCNC: 106 MMOL/L (ref 95–110)
CO2 SERPL-SCNC: 23 MMOL/L (ref 23–29)
CREAT SERPL-MCNC: 2 MG/DL (ref 0.5–1.4)
CREAT UR-MCNC: 89.1 MG/DL (ref 23–375)
DIFFERENTIAL METHOD: ABNORMAL
EOSINOPHIL # BLD AUTO: 0.1 K/UL (ref 0–0.5)
EOSINOPHIL NFR BLD: 1.5 % (ref 0–8)
EOSINOPHIL URNS QL WRIGHT STN: NORMAL
ERYTHROCYTE [DISTWIDTH] IN BLOOD BY AUTOMATED COUNT: 14.2 % (ref 11.5–14.5)
EST. GFR  (NO RACE VARIABLE): 32 ML/MIN/1.73 M^2
GLUCOSE SERPL-MCNC: 106 MG/DL (ref 70–110)
HCT VFR BLD AUTO: 38.7 % (ref 40–54)
HGB BLD-MCNC: 12.5 G/DL (ref 14–18)
IMM GRANULOCYTES # BLD AUTO: 0.02 K/UL (ref 0–0.04)
IMM GRANULOCYTES NFR BLD AUTO: 0.3 % (ref 0–0.5)
LYMPHOCYTES # BLD AUTO: 1.5 K/UL (ref 1–4.8)
LYMPHOCYTES NFR BLD: 25.1 % (ref 18–48)
MAGNESIUM SERPL-MCNC: 2.1 MG/DL (ref 1.6–2.6)
MCH RBC QN AUTO: 31.1 PG (ref 27–31)
MCHC RBC AUTO-ENTMCNC: 32.3 G/DL (ref 32–36)
MCV RBC AUTO: 96 FL (ref 82–98)
MONOCYTES # BLD AUTO: 1.1 K/UL (ref 0.3–1)
MONOCYTES NFR BLD: 19.2 % (ref 4–15)
NEUTROPHILS # BLD AUTO: 3.2 K/UL (ref 1.8–7.7)
NEUTROPHILS NFR BLD: 53.9 % (ref 38–73)
NRBC BLD-RTO: 0 /100 WBC
PHOSPHATE SERPL-MCNC: 3.6 MG/DL (ref 2.7–4.5)
PLATELET # BLD AUTO: 227 K/UL (ref 150–450)
PMV BLD AUTO: 11.3 FL (ref 9.2–12.9)
POTASSIUM SERPL-SCNC: 4.4 MMOL/L (ref 3.5–5.1)
PROT UR-MCNC: 45 MG/DL (ref 0–15)
PROT/CREAT UR: 0.51 MG/G{CREAT} (ref 0–0.2)
RBC # BLD AUTO: 4.02 M/UL (ref 4.6–6.2)
SODIUM SERPL-SCNC: 140 MMOL/L (ref 136–145)
SODIUM UR-SCNC: 72 MMOL/L (ref 20–250)
UUN UR-MCNC: 381 MG/DL (ref 140–1050)
WBC # BLD AUTO: 5.9 K/UL (ref 3.9–12.7)

## 2023-10-25 PROCEDURE — 83735 ASSAY OF MAGNESIUM: CPT | Performed by: HOSPITALIST

## 2023-10-25 PROCEDURE — 94761 N-INVAS EAR/PLS OXIMETRY MLT: CPT

## 2023-10-25 PROCEDURE — 25000003 PHARM REV CODE 250: Performed by: INTERNAL MEDICINE

## 2023-10-25 PROCEDURE — 99233 PR SUBSEQUENT HOSPITAL CARE,LEVL III: ICD-10-PCS | Mod: ,,, | Performed by: INTERNAL MEDICINE

## 2023-10-25 PROCEDURE — 25000003 PHARM REV CODE 250: Performed by: HOSPITALIST

## 2023-10-25 PROCEDURE — 21400001 HC TELEMETRY ROOM

## 2023-10-25 PROCEDURE — 85730 THROMBOPLASTIN TIME PARTIAL: CPT | Mod: 91 | Performed by: INTERNAL MEDICINE

## 2023-10-25 PROCEDURE — 84540 ASSAY OF URINE/UREA-N: CPT | Performed by: INTERNAL MEDICINE

## 2023-10-25 PROCEDURE — 63600175 PHARM REV CODE 636 W HCPCS: Performed by: HOSPITALIST

## 2023-10-25 PROCEDURE — 84300 ASSAY OF URINE SODIUM: CPT | Performed by: INTERNAL MEDICINE

## 2023-10-25 PROCEDURE — 36415 COLL VENOUS BLD VENIPUNCTURE: CPT | Performed by: INTERNAL MEDICINE

## 2023-10-25 PROCEDURE — 85025 COMPLETE CBC W/AUTO DIFF WBC: CPT | Performed by: HOSPITALIST

## 2023-10-25 PROCEDURE — 80048 BASIC METABOLIC PNL TOTAL CA: CPT | Performed by: HOSPITALIST

## 2023-10-25 PROCEDURE — C9113 INJ PANTOPRAZOLE SODIUM, VIA: HCPCS | Performed by: HOSPITALIST

## 2023-10-25 PROCEDURE — 99232 SBSQ HOSP IP/OBS MODERATE 35: CPT | Mod: ,,, | Performed by: INTERNAL MEDICINE

## 2023-10-25 PROCEDURE — 99232 PR SUBSEQUENT HOSPITAL CARE,LEVL II: ICD-10-PCS | Mod: ,,, | Performed by: INTERNAL MEDICINE

## 2023-10-25 PROCEDURE — 99233 SBSQ HOSP IP/OBS HIGH 50: CPT | Mod: ,,, | Performed by: INTERNAL MEDICINE

## 2023-10-25 PROCEDURE — 84100 ASSAY OF PHOSPHORUS: CPT | Performed by: HOSPITALIST

## 2023-10-25 PROCEDURE — 82570 ASSAY OF URINE CREATININE: CPT | Performed by: INTERNAL MEDICINE

## 2023-10-25 PROCEDURE — 85730 THROMBOPLASTIN TIME PARTIAL: CPT | Performed by: INTERNAL MEDICINE

## 2023-10-25 PROCEDURE — 87205 SMEAR GRAM STAIN: CPT | Performed by: INTERNAL MEDICINE

## 2023-10-25 RX ADMIN — NIFEDIPINE 30 MG: 30 TABLET, FILM COATED, EXTENDED RELEASE ORAL at 09:10

## 2023-10-25 RX ADMIN — ATORVASTATIN CALCIUM 40 MG: 20 TABLET, FILM COATED ORAL at 09:10

## 2023-10-25 RX ADMIN — NIFEDIPINE 30 MG: 30 TABLET, FILM COATED, EXTENDED RELEASE ORAL at 08:10

## 2023-10-25 RX ADMIN — CARVEDILOL 6.25 MG: 6.25 TABLET, FILM COATED ORAL at 08:10

## 2023-10-25 RX ADMIN — ACETAMINOPHEN 1000 MG: 500 TABLET ORAL at 02:10

## 2023-10-25 RX ADMIN — CARVEDILOL 6.25 MG: 6.25 TABLET, FILM COATED ORAL at 09:10

## 2023-10-25 RX ADMIN — ACETAMINOPHEN 1000 MG: 500 TABLET ORAL at 08:10

## 2023-10-25 RX ADMIN — ASPIRIN 81 MG CHEWABLE TABLET 81 MG: 81 TABLET CHEWABLE at 09:10

## 2023-10-25 RX ADMIN — APIXABAN 2.5 MG: 2.5 TABLET, FILM COATED ORAL at 08:10

## 2023-10-25 RX ADMIN — MORPHINE SULFATE 2 MG: 2 INJECTION, SOLUTION INTRAMUSCULAR; INTRAVENOUS at 12:10

## 2023-10-25 NOTE — SUBJECTIVE & OBJECTIVE
Interval History: Patient is awake and alert this morning.  No adverse events reported overnight.  No chest pain or SOB.  Pox stable on O2 2L NC.  No cough.  No Fevers.  Flank pain is better, but not resolved.      Review of Systems   Constitutional:  Negative for chills, diaphoresis, fatigue and fever.   HENT:  Negative for congestion, ear pain, hearing loss and tinnitus.    Eyes:  Negative for photophobia, pain, discharge and visual disturbance.   Respiratory:  Positive for shortness of breath (improving). Negative for cough, chest tightness and wheezing.    Cardiovascular:  Negative for chest pain, palpitations and leg swelling.   Gastrointestinal:  Negative for abdominal distention, blood in stool, constipation, diarrhea, nausea and vomiting.   Endocrine: Negative for cold intolerance, heat intolerance, polydipsia, polyphagia and polyuria.   Genitourinary:  Positive for flank pain (improving). Negative for dysuria, frequency, hematuria and urgency.   Musculoskeletal:  Negative for arthralgias, back pain, gait problem, myalgias and neck pain.   Skin:  Negative for rash and wound.   Allergic/Immunologic: Negative for environmental allergies, food allergies and immunocompromised state.   Neurological:  Negative for seizures, syncope, facial asymmetry, weakness and headaches.   Psychiatric/Behavioral:  Negative for agitation, behavioral problems, confusion and hallucinations.      Objective:     Vital Signs (Most Recent):  Temp: 97.7 °F (36.5 °C) (10/25/23 1133)  Pulse: (!) 56 (10/25/23 1156)  Resp: 20 (10/25/23 1133)  BP: (!) 142/77 (10/25/23 1133)  SpO2: 95 % (10/25/23 1133) Vital Signs (24h Range):  Temp:  [97.7 °F (36.5 °C)-98.6 °F (37 °C)] 97.7 °F (36.5 °C)  Pulse:  [53-73] 56  Resp:  [16-20] 20  SpO2:  [94 %-97 %] 95 %  BP: (142-174)/(72-83) 142/77     Weight: 66.7 kg (147 lb)  Body mass index is 22.35 kg/m².    Intake/Output Summary (Last 24 hours) at 10/25/2023 1334  Last data filed at 10/24/2023 1940  Gross  per 24 hour   Intake --   Output 230 ml   Net -230 ml           Physical Exam  Constitutional:       General: He is not in acute distress.     Appearance: He is normal weight. He is not ill-appearing, toxic-appearing or diaphoretic.   HENT:      Head: Normocephalic and atraumatic.      Right Ear: External ear normal.      Left Ear: External ear normal.      Nose: Nose normal.      Mouth/Throat:      Mouth: Mucous membranes are moist.      Pharynx: Oropharynx is clear. No oropharyngeal exudate.   Eyes:      General: No scleral icterus.        Right eye: No discharge.         Left eye: No discharge.      Conjunctiva/sclera: Conjunctivae normal.      Pupils: Pupils are equal, round, and reactive to light.   Neck:      Thyroid: No thyromegaly.      Vascular: No JVD.      Trachea: No tracheal deviation.   Cardiovascular:      Rate and Rhythm: Normal rate and regular rhythm.      Heart sounds: Normal heart sounds. No murmur heard.     No friction rub. No gallop.   Pulmonary:      Effort: Pulmonary effort is normal. No respiratory distress.      Breath sounds: Normal breath sounds. No stridor. No wheezing or rales.   Chest:      Chest wall: No tenderness.   Abdominal:      General: Bowel sounds are normal. There is no distension.      Palpations: Abdomen is soft. There is no mass.      Tenderness: There is no abdominal tenderness. There is no guarding or rebound.   Musculoskeletal:         General: No tenderness. Normal range of motion.      Cervical back: Normal range of motion and neck supple. No rigidity.      Right lower leg: No edema.      Left lower leg: No edema.   Lymphadenopathy:      Cervical: No cervical adenopathy.   Skin:     General: Skin is warm and dry.      Coloration: Skin is not pale.      Findings: No erythema or rash.   Neurological:      Mental Status: He is alert and oriented to person, place, and time.      Cranial Nerves: No cranial nerve deficit.      Motor: No abnormal muscle tone.       Coordination: Coordination normal.      Deep Tendon Reflexes: Reflexes are normal and symmetric. Reflexes normal.      Comments: Patient with residual left-sided weakness, mild dysarthria.  At baseline per family at bedside.   Psychiatric:         Mood and Affect: Mood normal.         Behavior: Behavior normal.         Thought Content: Thought content normal.             Significant Labs: All pertinent labs within the past 24 hours have been reviewed.    Significant Imaging: I have reviewed all pertinent imaging results/findings within the past 24 hours.

## 2023-10-25 NOTE — PLAN OF CARE
Pt free of any injury or falls, VSS, skin intact. Scheduled and PRN meds given as ordered or when requested. Demonstrated ability to use call light when needed. Bed locked in lowest position. Care plan reviewed w/ patient and education given. Pt is not demonstrating any overt S/S of pain or distress at this time. Will continue to monitor.  Pt remained free from falls throughout shift, SR up x 2, bed in low and locked position, call bell within reach, No complaints or evidence of distress at this time.

## 2023-10-25 NOTE — ASSESSMENT & PLAN NOTE
Creatinine on admission was 1.5 from a baseline of around 1.5 (2019).  Creatinine bumped to 1.9 and 2.0 after starting IV Lasix and after CTA of C/A/P with contrast.  Uric Acid elevated at 7.8 and FEUrea 28.5 suggests a pre-renal component.  Urine Eos negative.  US of Kidneys on 10/25/2023 without evidence of obrstuction.  -Hold further Lasix   -Hold ARB  -Renal dose medications and avoid nephrotoxins  -Monitor for improvement  -Consider Nephrology consult

## 2023-10-25 NOTE — PROGRESS NOTES
[address] Medicine  Progress Note    Patient Name: Dwayne Saravia  MRN: [id number]  Patient Class: IP- Inpatient   Admission Date: 10/23/2023  Length of Stay: 2 days  Attending Physician: Sanchez Veras MD  Primary Care Provider: William Mabry MD        Subjective:     Principal Problem:Acute hypoxic respiratory failure        HPI:  Patient is 88-year-old man with history of hypertension, chronic kidney disease stage III, dyslipidemia, chronic obstructive pulmonary disease, prior tobacco smoker (2 packs per week for 15 years), prior stroke with residual right-sided weakness who walks with walker at baseline who presents to the emergency department with respiratory distress with tachypnea new onset hypoxia.  Patient reports feeling well yesterday.  This morning he felt sudden onset of left-sided flank pain while in bed. He reports associated nausea and vomiting.  No dysuria or frequency.  No constipation or diarrhea.  Last bowel movement yesterday which was normal.  No fevers, chills.  No cough or sputum.  No recent fall.  No recent travel or sick contact.      He lives at home with his wife.  No alcohol or illicit drugs.  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  He reports family history of hypertension.        Overview/Hospital Course:  See below      Interval History: Patient is awake and alert this morning.  No adverse events reported overnight.  No chest pain or SOB.  Pox stable on O2 2L NC.  No cough.  No Fevers.  Flank pain is better, but not resolved.      Review of Systems   Constitutional:  Negative for chills, diaphoresis, fatigue and fever.   HENT:  Negative for congestion, ear pain, hearing loss and tinnitus.    Eyes:  Negative for photophobia, pain, discharge and visual disturbance.   Respiratory:  Positive for shortness of breath (improving). Negative for cough, chest tightness and wheezing.    Cardiovascular:   Negative for chest pain, palpitations and leg swelling.   Gastrointestinal:  Negative for abdominal distention, blood in stool, constipation, diarrhea, nausea and vomiting.   Endocrine: Negative for cold intolerance, heat intolerance, polydipsia, polyphagia and polyuria.   Genitourinary:  Positive for flank pain (improving). Negative for dysuria, frequency, hematuria and urgency.   Musculoskeletal:  Negative for arthralgias, back pain, gait problem, myalgias and neck pain.   Skin:  Negative for rash and wound.   Allergic/Immunologic: Negative for environmental allergies, food allergies and immunocompromised state.   Neurological:  Negative for seizures, syncope, facial asymmetry, weakness and headaches.   Psychiatric/Behavioral:  Negative for agitation, behavioral problems, confusion and hallucinations.      Objective:     Vital Signs (Most Recent):  Temp: 97.7 °F (36.5 °C) (10/25/23 1133)  Pulse: (!) 56 (10/25/23 1156)  Resp: 20 (10/25/23 1133)  BP: (!) 142/77 (10/25/23 1133)  SpO2: 95 % (10/25/23 1133) Vital Signs (24h Range):  Temp:  [97.7 °F (36.5 °C)-98.6 °F (37 °C)] 97.7 °F (36.5 °C)  Pulse:  [53-73] 56  Resp:  [16-20] 20  SpO2:  [94 %-97 %] 95 %  BP: (142-174)/(72-83) 142/77     Weight: 66.7 kg (147 lb)  Body mass index is 22.35 kg/m².    Intake/Output Summary (Last 24 hours) at 10/25/2023 1334  Last data filed at 10/24/2023 1940  Gross per 24 hour   Intake --   Output 230 ml   Net -230 ml           Physical Exam  Constitutional:       General: He is not in acute distress.     Appearance: He is normal weight. He is not ill-appearing, toxic-appearing or diaphoretic.   HENT:      Head: Normocephalic and atraumatic.      Right Ear: External ear normal.      Left Ear: External ear normal.      Nose: Nose normal.      Mouth/Throat:      Mouth: Mucous membranes are moist.      Pharynx: Oropharynx is clear. No oropharyngeal exudate.   Eyes:      General: No scleral icterus.        Right eye: No discharge.          Left eye: No discharge.      Conjunctiva/sclera: Conjunctivae normal.      Pupils: Pupils are equal, round, and reactive to light.   Neck:      Thyroid: No thyromegaly.      Vascular: No JVD.      Trachea: No tracheal deviation.   Cardiovascular:      Rate and Rhythm: Normal rate and regular rhythm.      Heart sounds: Normal heart sounds. No murmur heard.     No friction rub. No gallop.   Pulmonary:      Effort: Pulmonary effort is normal. No respiratory distress.      Breath sounds: Normal breath sounds. No stridor. No wheezing or rales.   Chest:      Chest wall: No tenderness.   Abdominal:      General: Bowel sounds are normal. There is no distension.      Palpations: Abdomen is soft. There is no mass.      Tenderness: There is no abdominal tenderness. There is no guarding or rebound.   Musculoskeletal:         General: No tenderness. Normal range of motion.      Cervical back: Normal range of motion and neck supple. No rigidity.      Right lower leg: No edema.      Left lower leg: No edema.   Lymphadenopathy:      Cervical: No cervical adenopathy.   Skin:     General: Skin is warm and dry.      Coloration: Skin is not pale.      Findings: No erythema or rash.   Neurological:      Mental Status: He is alert and oriented to person, place, and time.      Cranial Nerves: No cranial nerve deficit.      Motor: No abnormal muscle tone.      Coordination: Coordination normal.      Deep Tendon Reflexes: Reflexes are normal and symmetric. Reflexes normal.      Comments: Patient with residual left-sided weakness, mild dysarthria.  At baseline per family at bedside.   Psychiatric:         Mood and Affect: Mood normal.         Behavior: Behavior normal.         Thought Content: Thought content normal.             Significant Labs: All pertinent labs within the past 24 hours have been reviewed.    Significant Imaging: I have reviewed all pertinent imaging results/findings within the past 24 hours.      Assessment/Plan:      *  Acute hypoxic respiratory failure  Patient with history of Emphysema who presented with acute respiratory distress.  Patient reports sudden onset of left-sided flank pain while in bed and associated nausea and vomiting.  No dysuria or frequency.  Pox in 80s on RA.  CXR on admission with question patchy opacities in the medial lung bases, which could relate to atelectasis, aspiration or pneumonia.  CTA of C/A/P with No evidence of thoracic or abdominal aorta dissection.  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  No reports of PE, but study was not a good study to rule out pulmonary embolus per Radiology.  US of LE Veins with no evidence of lower extremity deep venous thrombosis.  TTE on admission with EF of 65-70% and Grade I DD.  Labs on admission with Trop negative x 3, , Lactate normal, Procal negative, VBG pH of 7.347 and pCO2 of 46.8, D-Dimers 2.53.  Patient started on IV Lasix 40mg and IV Heparin given concern for possible PE.  He was also given a dose of Azithromycin in the ED.  SOB improved with diuresis, but still on 2L O2NC with Pox in mid-90s.  Given ongoing hypoxia (possibly due to underlying emphysema) with elevated D-dimer, will change IV heparin to po Apixaban 2.5mg bid until can rule out PE.    Plan:  Follow up with Cardiology recommendations  Holding further Lasix given RICCI  Change IV Heparin to Apixaban 2.5mg bid until imaging can be done to rule out PE given ongoing hypoxia and elevated D-dimers  Check V-Q scan  Continue with O2NC to keep Pox >90  Consider Pulmonary consult if no improvement or worsens.    Acute kidney injury superimposed on CKD  Creatinine on admission was 1.5 from a baseline of around 1.5 (2019).  Creatinine bumped to 1.9 and 2.0 after starting IV Lasix and after CTA of C/A/P with contrast.  Uric Acid elevated at 7.8 and FEUrea 28.5 suggests a pre-renal component.  Urine Eos negative.  US of Kidneys on 10/25/2023 without evidence of obrstuction.  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  Since that time he has been having residual left-sided hemiparesis.    -Continue ASA/Statin    Renal hypertension  SBP a little elevated.  Home Meds:  Nifedipine XL 60mg, Carvedilol 6.25mg bid, Losartan 100mg, HCTZ 25mg  Hospital Meds:  Nifedipine XL 30mg bid, Carvedilol 6.25mg bid  -Hold ARB given RICCI  -Continue Nifedipine and Carvedilol  -Monitor and adjust as needed      Gastroesophageal reflux disease without esophagitis  -Continue with PPI        VTE Risk Mitigation (From admission, onward)         Ordered     apixaban tablet 2.5 mg  2 times daily         10/25/23 1344     IP VTE HIGH RISK PATIENT  Once         10/23/23 1644                Discharge Planning   HAILY:      Code Status: Full Code   Is the patient medically ready for discharge?:     Reason for patient still in hospital (select all that apply): Patient trending condition, Treatment and Consult recommendations  Discharge Plan A: Home with family                  Sanchez Veras MD  Department of Hospital Medicine   [facility] - Med Surg ([address])

## 2023-10-25 NOTE — PROGRESS NOTES
"    Cardiology Progress Note    10/25/2023  9:42 AM    Subjective/Interim:      No complaints.  Patient denies any chest pain or shortness of breath.  Furosemide has been on hold due to rising creatinine to 2.0.     Objective:   24-hour Vitals:  Temp:  [98 °F (36.7 °C)-98.6 °F (37 °C)] 98.2 °F (36.8 °C)  Pulse:  [53-73] 57  Resp:  [16-19] 18  SpO2:  [94 %-97 %] 97 %  BP: (148-174)/(72-83) 155/83    Physical Examination:  Vitals: BP (!) 155/83   Pulse (!) 57   Temp 98.2 °F (36.8 °C) (Oral)   Resp 18   Ht 5' 8" (1.727 m)   Wt 66.7 kg (147 lb)   SpO2 97%   BMI 22.35 kg/m²     Physical Exam  Vitals reviewed.   Neck:      Vascular: No JVD.   Cardiovascular:      Rate and Rhythm: Normal rate and regular rhythm.      Pulses:           Carotid pulses are 2+ on the right side and 2+ on the left side.       Radial pulses are 2+ on the right side and 2+ on the left side.      Heart sounds: S1 normal and S2 normal.   Pulmonary:      Breath sounds: Normal breath sounds and air entry.   Musculoskeletal:      Right lower leg: No edema.      Left lower leg: No edema.   Neurological:      Mental Status: He is alert.           Intake/Output Summary (Last 24 hours) at 10/25/2023 0942  Last data filed at 10/24/2023 1940  Gross per 24 hour   Intake --   Output 230 ml   Net -230 ml       Laboratory:  Trended Lab Data:  Recent Labs   Lab 10/23/23  1126 10/24/23  0608 10/25/23  0420   WBC 4.95 5.37 5.90   HGB 14.2 12.4* 12.5*   HCT 44.4 38.5* 38.7*    218 227       Recent Labs   Lab 10/23/23  1126 10/24/23  0608 10/25/23  0420    138 140   K 4.5 4.7 4.4    107 106   CO2 17* 20* 23   BUN 19 24* 30*   GLU 83 90 106   CALCIUM 9.1 8.8 8.9   MG  --  2.1 2.1   PHOS  --  4.0 3.6       Recent Labs   Lab 10/23/23  1126   PROT 7.8   ALBUMIN 3.7   BILITOT 0.6   AST 23   ALT 14   ALKPHOS 107       Recent Labs   Lab 10/23/23  1724   INR 1.0       Cardiac:   Recent Labs   Lab 10/23/23  1154 10/23/23  1539 10/23/23  1722 " 10/24/23  0608   TROPONINI  --  0.007 <0.006 0.018   *  --   --   --        FLP:   Lab Results   Component Value Date    CHOL 122 05/10/2021    HDL 41 05/10/2021    LDLCALC 61.6 (L) 05/10/2021    TRIG 97 05/10/2021    CHOLHDL 33.6 05/10/2021     DM:   Lab Results   Component Value Date    HGBA1C 5.7 (H) 10/24/2023    HGBA1C 6.1 (H) 08/28/2020    LDLCALC 61.6 (L) 05/10/2021    CREATININE 2.0 (H) 10/25/2023     Thyroid:   Lab Results   Component Value Date    TSH 1.354 08/28/2020     Anemia:   Lab Results   Component Value Date    IRON 101 11/20/2008    TIBC 291 11/20/2008    FERRITIN 462 (H) 11/20/2008    [id number] 698 11/20/2008    FOLATE 16.2 11/20/2008     Urinalysis:   Lab Results   Component Value Date    LABURIN NO GROWTH AFTER 48 HOURS. 11/19/2008    COLORU Yellow 10/23/2023    SPECGRAV >1.030 (A) 10/23/2023    NITRITE Negative 10/23/2023    KETONESU Negative 10/23/2023    UROBILINOGEN Negative 10/23/2023     @      Other Results:  EKG (my interpretation):    TELEMETRY:  sinus    Echo:   Results for orders placed or performed during the hospital encounter of 10/23/23   Echo   Result Value Ref Range    BSA 1.79 m2    LA WIDTH 3.4 cm    Left Atrium Area-systolic Apical Two Chamber 17.0 cm2    Left Atrium Area-systolic Four Chamber 17.0 cm2    TAPSE 1.80 cm    RA Width 3.0 cm    LVOT stroke volume 84.77 cm3    LVIDd 3.92 3.5 - 6.0 cm    LV Systolic Volume 17.17 mL    LV Systolic Volume Index 9.6 mL/m2    LVIDs 2.25 2.1 - 4.0 cm    LV Diastolic Volume 66.79 mL    LV Diastolic Volume Index 37.31 mL/m2    IVS 1.41 (A) 0.6 - 1.1 cm    LVOT diameter 1.91 cm    LVOT area 2.9 cm2    FS 43 28 - 44 %    Left Ventricle Relative Wall Thickness 0.66 cm    Posterior Wall 1.29 (A) 0.6 - 1.1 cm    LV mass 191.86 g    LV Mass Index 107 g/m2    MV Peak E Brody 0.81 m/s    TDI LATERAL 0.03 m/s    TDI SEPTAL 0.04 m/s    E/E' ratio 23.14 m/s    MV Peak A Brody 1.15 m/s    TR Max Brody 2.91 m/s    E/A ratio 0.70     IVRT 188.39  msec    E wave deceleration time 352.82 msec    LV SEPTAL E/E' RATIO 20.25 m/s    LA Volume Index 21.7 mL/m2    LV LATERAL E/E' RATIO 27.00 m/s    LA volume 38.83 cm3    LVOT peak palomo 1.43 m/s    Left Ventricular Outflow Tract Mean Velocity 0.92 cm/s    Left Ventricular Outflow Tract Mean Gradient 4.00 mmHg    LA size 2.61 cm    Left Atrium Minor Axis 5.35 cm    Left Atrium Major Axis 4.96 cm    RVDD 2.20 cm    RV S' 18.80 cm/s    RA Major Axis 3.84 cm    AV mean gradient 6 mmHg    AV peak gradient 11 mmHg    Ao peak palomo 1.67 m/s    Ao VTI 34.00 cm    LVOT peak VTI 29.60 cm    AV valve area 2.49 cm²    AV Velocity Ratio 0.86     AV index (prosthetic) 0.87     BENNIE by Velocity Ratio 2.45 cm²    Mr max palomo 0.03 m/s    MV stenosis pressure 1/2 time 102.32 ms    MV valve area p 1/2 method 2.15 cm2    Triscuspid Valve Regurgitation Peak Gradient 34 mmHg    PV PEAK VELOCITY 0.95 m/s    PV peak gradient 4 mmHg    Pulmonary Valve Mean Velocity 0.65 m/s    Ao root annulus 2.94 cm    STJ 3.02 cm    Ascending aorta 3.13 cm    IVC diameter 1.85 cm    Mean e' 0.04 m/s    ZLVIDS -2.43     ZLVIDD -2.33     Narrative      Left Ventricle: The left ventricle is normal in size. Moderately   increased wall thickness. Normal wall motion. There is normal systolic   function with a visually estimated ejection fraction of 65 - 70%. Grade I   diastolic dysfunction.    Right Ventricle: Normal right ventricular cavity size. Systolic   function is normal.    Aortic Valve: There is mild aortic valve sclerosis.    Mitral Valve: There is no stenosis. There is no significant   regurgitation.    Tricuspid Valve: There is normal leaflet mobility. There is no   significant regurgitation.         Radiology:  US Retroperitoneal Complete    Result Date: 10/25/2023  EXAMINATION: US RETROPERITONEAL COMPLETE CLINICAL HISTORY: joaquim; TECHNIQUE: Ultrasound of the kidneys and urinary bladder was performed including color flow and Doppler evaluation of the  kidneys. COMPARISON: None. FINDINGS: Right kidney: The right kidney measures 10.7 cm. No cortical thinning. No loss of corticomedullary distinction. Resistive index measures 0.73.  Approximately 3 cysts, largest measuring 4.1 cm.  The 2 smaller cysts both have thin internal septations.  No renal stone. No hydronephrosis. Left kidney: The left kidney measures 13.8 cm, with measurement including 1 of the larger cysts.  No cortical thinning. No loss of corticomedullary distinction. Resistive index measures 0.71.  Two large cysts.  Largest in the lower pole measures 7 cm and contains a mildly thickened septation.  No renal stone. No hydronephrosis. The bladder is partially distended at the time of scanning and has an unremarkable appearance.     No acute process seen. Bilateral simple and complex renal cysts. Electronically signed by: Mercedes Drake Date:    10/25/2023 Time:    09:07    Echo    Result Date: 10/24/2023    Left Ventricle: The left ventricle is normal in size. Moderately increased wall thickness. Normal wall motion. There is normal systolic function with a visually estimated ejection fraction of 65 - 70%. Grade I diastolic dysfunction.   Right Ventricle: Normal right ventricular cavity size. Systolic function is normal.   Aortic Valve: There is mild aortic valve sclerosis.   Mitral Valve: There is no stenosis. There is no significant regurgitation.   Tricuspid Valve: There is normal leaflet mobility. There is no significant regurgitation.     US Lower Extremity Veins Bilateral    Result Date: 10/23/2023  EXAMINATION: US LOWER EXTREMITY VEINS BILATERAL CLINICAL HISTORY: Rule out DVT; TECHNIQUE: Duplex and color flow Doppler evaluation of the bilateral lower extremity veins was performed. COMPARISON: None FINDINGS: No evidence of clot involving the bilateral common femoral, greater saphenous, femoral, popliteal, peroneal, anterior and posterior tibial veins.  All venous structures demonstrate normal  respiratory phasicity and augment adequately.  No evidence of soft tissue mass or Baker's cyst.     No evidence of lower extremity deep venous thrombosis. Electronically signed by: Dago Simms MD Date:    10/23/2023 Time:    17:53    CTA Chest Abdomen Pelvis    Result Date: 10/23/2023  EXAMINATION: CTA CHEST ABDOMEN PELVIS CLINICAL HISTORY: Aortic dissection suspected;flank pain with ekg changes; TECHNIQUE: 1.25 mm axial images of the chest, thoracic and abdominal aorta were obtained after the administration of 100 cc of Omni 350 IV contrast, coronal and sagittal images reformatted. COMPARISON: None FINDINGS: Pre contrast images demonstrate no hyperattenuating mural thrombus to suggest an acute dissection. The thoracic aorta is of normal caliber and demonstrates mild atherosclerotic plaque.  The ascending aorta measures approximately 3.8 cm, the descending thoracic aorta measures 3.5 cm. The abdominal aorta is aneurysmal measuring 3.1 cm in its infrarenal region. The celiac trunk, SMA, bilateral renal arteries and KAITLYN are patent.  The bilateral iliac and femoral vessels are patent. The airways are patent.  The thyroid gland appears normal.  No mediastinal or hilar lymphadenopathy.  The cardiac silhouette is slightly prominent.  Coronary artery calcifications seen.  No pericardial effusion.  The esophagus course normally. Emphysema noted.  There is mild atelectasis at the bilateral lung basis.  No pleural effusion.  The axillary regions appear normal. No liver lesions.  The gallbladder is present, no radiopaque stones seen within.  Small hiatal hernia.  The remainder of the stomach appears normal.  The pancreas, spleen and bilateral adrenal glands appear normal.  Bilateral low attenuating kidney lesions, the larger ones are likely cysts, some lesions are too small to be characterized.  No hydronephrosis.  The bilateral ureters appear normal.  No para-aortic lymphadenopathy.  Mild stool retention. No inflammatory  changes of the bowel seen, no bowel dilation. The appendix is normal.  The bladder appears normal. The prostate is mildly enlarged. The inguinal regions appear normal. The osseous structures demonstrate no osseous lesions.     No evidence of thoracic or abdominal aorta dissection. 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 Bilateral kidney lesion, the largest 1 appear to represent, the smaller ones are too small to be characterized. Small hiatal hernia. Electronically signed by: Kimberly Harper MD Date:    10/23/2023 Time:    13:46    X-Ray Chest AP Portable    Result Date: 10/23/2023  EXAMINATION: XR CHEST AP PORTABLE CLINICAL HISTORY: Tachypnea, not elsewhere classified TECHNIQUE: Single frontal view of the chest was performed. COMPARISON: Chest radiograph 09/30/2022. FINDINGS: Monitoring leads overlie the chest. Grossly unchanged cardiomediastinal contours, noting enlargement of the cardiac silhouette. Question patchy opacities in medial lung bases. No definite pneumothorax or large volume pleural effusion.  Nonspecific increased attenuation in the right paramedian upper mediastinum stable dating back to at least 11/17/2008 radiograph. No definite acute findings in the visualized abdomen. Osseous and soft tissue structures without definite acute change.     Question patchy opacities in the medial lung bases, which could relate to atelectasis, aspiration or pneumonia. Electronically signed by: Eddie Corbin Date:    10/23/2023 Time:    12:38        Current Medications:     Infusions:   heparin (porcine) in D5W 8 Units/kg/hr (10/25/23 0221)        Scheduled:   aspirin  81 mg Oral Daily    atorvastatin  40 mg Oral Daily    carvediloL  6.25 mg Oral BID    NIFEdipine  30 mg Oral BID    pantoprazole  40 mg Intravenous Daily        PRN:  acetaminophen, heparin (PORCINE), heparin (PORCINE), morphine, sodium chloride 0.9%     Assessment and Plan:     Dwayne Saravia is a 88 y.o.male with  Acute hypoxic respiratory  failure  Mild acute diastoic CHF responding to diuretics.  Negative 1L.  No ACS.  T waves inversion likely from LVH.  Echo resulted.  -Euvolemic today.  agree with holding lasix.     HTN   Continue meds and monitor     H/o CVA     CKD 3   As per hospital medicine team         Don Mitchell MD        Disclaimer: This document was created using voice recognition software ([employer] Fluency Direct). Although it may be edited, this document may contain errors related to incorrect recognition of the spoken word. Please call the physician if clarification is needed.

## 2023-10-25 NOTE — ASSESSMENT & PLAN NOTE
Patient with history of Emphysema who presented with acute respiratory distress.  Patient reports sudden onset of left-sided flank pain while in bed and associated nausea and vomiting.  No dysuria or frequency.  Pox in 80s on RA.  CXR on admission with question patchy opacities in the medial lung bases, which could relate to atelectasis, aspiration or pneumonia.  CTA of C/A/P with No evidence of thoracic or abdominal aorta dissection.  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  No reports of PE, but study was not a good study to rule out pulmonary embolus per Radiology.  US of LE Veins with no evidence of lower extremity deep venous thrombosis.  TTE on admission with EF of 65-70% and Grade I DD.  Labs on admission with Trop negative x 3, , Lactate normal, Procal negative, VBG pH of 7.347 and pCO2 of 46.8, D-Dimers 2.53.  Patient started on IV Lasix 40mg and IV Heparin given concern for possible PE.  He was also given a dose of Azithromycin in the ED.  SOB improved with diuresis, but still on 2L O2NC with Pox in mid-90s.  Given ongoing hypoxia (possibly due to underlying emphysema) with elevated D-dimer, will change IV heparin to po Apixaban 2.5mg bid until can rule out PE.    Plan:  Follow up with Cardiology recommendations  Holding further Lasix given RICCI  Change IV Heparin to Apixaban 2.5mg bid until imaging can be done to rule out PE given ongoing hypoxia and elevated D-dimers  Check V-Q scan  Continue with O2NC to keep Pox >90  Consider Pulmonary consult if no improvement or worsens.

## 2023-10-26 LAB
ANION GAP SERPL CALC-SCNC: 8 MMOL/L (ref 8–16)
APTT PPP: 30.6 SEC (ref 21–32)
BACTERIA BLD CULT: ABNORMAL
BASOPHILS # BLD AUTO: 0.01 K/UL (ref 0–0.2)
BASOPHILS NFR BLD: 0.2 % (ref 0–1.9)
BUN SERPL-MCNC: 33 MG/DL (ref 8–23)
CALCIUM SERPL-MCNC: 8.6 MG/DL (ref 8.7–10.5)
CHLORIDE SERPL-SCNC: 107 MMOL/L (ref 95–110)
CO2 SERPL-SCNC: 24 MMOL/L (ref 23–29)
CREAT SERPL-MCNC: 1.9 MG/DL (ref 0.5–1.4)
DIFFERENTIAL METHOD: ABNORMAL
EOSINOPHIL # BLD AUTO: 0.1 K/UL (ref 0–0.5)
EOSINOPHIL NFR BLD: 1.6 % (ref 0–8)
ERYTHROCYTE [DISTWIDTH] IN BLOOD BY AUTOMATED COUNT: 14.4 % (ref 11.5–14.5)
EST. GFR  (NO RACE VARIABLE): 34 ML/MIN/1.73 M^2
GLUCOSE SERPL-MCNC: 93 MG/DL (ref 70–110)
HCT VFR BLD AUTO: 39.1 % (ref 40–54)
HGB BLD-MCNC: 12.6 G/DL (ref 14–18)
IMM GRANULOCYTES # BLD AUTO: 0.01 K/UL (ref 0–0.04)
IMM GRANULOCYTES NFR BLD AUTO: 0.2 % (ref 0–0.5)
LYMPHOCYTES # BLD AUTO: 1.5 K/UL (ref 1–4.8)
LYMPHOCYTES NFR BLD: 25.8 % (ref 18–48)
MAGNESIUM SERPL-MCNC: 2.1 MG/DL (ref 1.6–2.6)
MCH RBC QN AUTO: 31 PG (ref 27–31)
MCHC RBC AUTO-ENTMCNC: 32.2 G/DL (ref 32–36)
MCV RBC AUTO: 96 FL (ref 82–98)
MONOCYTES # BLD AUTO: 1.2 K/UL (ref 0.3–1)
MONOCYTES NFR BLD: 20.7 % (ref 4–15)
NEUTROPHILS # BLD AUTO: 2.9 K/UL (ref 1.8–7.7)
NEUTROPHILS NFR BLD: 51.5 % (ref 38–73)
NRBC BLD-RTO: 0 /100 WBC
PHOSPHATE SERPL-MCNC: 3.6 MG/DL (ref 2.7–4.5)
PLATELET # BLD AUTO: 222 K/UL (ref 150–450)
PMV BLD AUTO: 11.1 FL (ref 9.2–12.9)
POTASSIUM SERPL-SCNC: 4.6 MMOL/L (ref 3.5–5.1)
RBC # BLD AUTO: 4.07 M/UL (ref 4.6–6.2)
SODIUM SERPL-SCNC: 139 MMOL/L (ref 136–145)
WBC # BLD AUTO: 5.61 K/UL (ref 3.9–12.7)

## 2023-10-26 PROCEDURE — 21400001 HC TELEMETRY ROOM

## 2023-10-26 PROCEDURE — 85025 COMPLETE CBC W/AUTO DIFF WBC: CPT | Performed by: HOSPITALIST

## 2023-10-26 PROCEDURE — 36415 COLL VENOUS BLD VENIPUNCTURE: CPT | Performed by: INTERNAL MEDICINE

## 2023-10-26 PROCEDURE — 99233 PR SUBSEQUENT HOSPITAL CARE,LEVL III: ICD-10-PCS | Mod: ,,, | Performed by: INTERNAL MEDICINE

## 2023-10-26 PROCEDURE — 85730 THROMBOPLASTIN TIME PARTIAL: CPT | Performed by: INTERNAL MEDICINE

## 2023-10-26 PROCEDURE — 84100 ASSAY OF PHOSPHORUS: CPT | Performed by: HOSPITALIST

## 2023-10-26 PROCEDURE — C9113 INJ PANTOPRAZOLE SODIUM, VIA: HCPCS | Performed by: HOSPITALIST

## 2023-10-26 PROCEDURE — 83735 ASSAY OF MAGNESIUM: CPT | Performed by: HOSPITALIST

## 2023-10-26 PROCEDURE — 80048 BASIC METABOLIC PNL TOTAL CA: CPT | Performed by: HOSPITALIST

## 2023-10-26 PROCEDURE — 94761 N-INVAS EAR/PLS OXIMETRY MLT: CPT

## 2023-10-26 PROCEDURE — 63600175 PHARM REV CODE 636 W HCPCS: Performed by: HOSPITALIST

## 2023-10-26 PROCEDURE — 25000003 PHARM REV CODE 250: Performed by: INTERNAL MEDICINE

## 2023-10-26 PROCEDURE — 25000003 PHARM REV CODE 250: Performed by: HOSPITALIST

## 2023-10-26 PROCEDURE — 99233 SBSQ HOSP IP/OBS HIGH 50: CPT | Mod: ,,, | Performed by: INTERNAL MEDICINE

## 2023-10-26 RX ORDER — NIFEDIPINE 30 MG/1
60 TABLET, EXTENDED RELEASE ORAL 2 TIMES DAILY
Status: DISCONTINUED | OUTPATIENT
Start: 2023-10-26 | End: 2023-10-29 | Stop reason: HOSPADM

## 2023-10-26 RX ORDER — DOCUSATE SODIUM 100 MG/1
100 CAPSULE, LIQUID FILLED ORAL 2 TIMES DAILY
Status: DISCONTINUED | OUTPATIENT
Start: 2023-10-26 | End: 2023-10-29 | Stop reason: HOSPADM

## 2023-10-26 RX ORDER — NIFEDIPINE 30 MG/1
30 TABLET, EXTENDED RELEASE ORAL ONCE
Status: COMPLETED | OUTPATIENT
Start: 2023-10-26 | End: 2023-10-26

## 2023-10-26 RX ADMIN — ASPIRIN 81 MG CHEWABLE TABLET 81 MG: 81 TABLET CHEWABLE at 08:10

## 2023-10-26 RX ADMIN — MORPHINE SULFATE 2 MG: 2 INJECTION, SOLUTION INTRAMUSCULAR; INTRAVENOUS at 04:10

## 2023-10-26 RX ADMIN — CARVEDILOL 6.25 MG: 6.25 TABLET, FILM COATED ORAL at 08:10

## 2023-10-26 RX ADMIN — APIXABAN 2.5 MG: 2.5 TABLET, FILM COATED ORAL at 09:10

## 2023-10-26 RX ADMIN — NIFEDIPINE 60 MG: 30 TABLET, FILM COATED, EXTENDED RELEASE ORAL at 09:10

## 2023-10-26 RX ADMIN — NIFEDIPINE 30 MG: 30 TABLET, FILM COATED, EXTENDED RELEASE ORAL at 11:10

## 2023-10-26 RX ADMIN — PANTOPRAZOLE SODIUM 40 MG: 40 INJECTION, POWDER, FOR SOLUTION INTRAVENOUS at 08:10

## 2023-10-26 RX ADMIN — MORPHINE SULFATE 2 MG: 2 INJECTION, SOLUTION INTRAMUSCULAR; INTRAVENOUS at 09:10

## 2023-10-26 RX ADMIN — ATORVASTATIN CALCIUM 40 MG: 20 TABLET, FILM COATED ORAL at 08:10

## 2023-10-26 RX ADMIN — CARVEDILOL 6.25 MG: 6.25 TABLET, FILM COATED ORAL at 09:10

## 2023-10-26 RX ADMIN — APIXABAN 2.5 MG: 2.5 TABLET, FILM COATED ORAL at 08:10

## 2023-10-26 RX ADMIN — DOCUSATE SODIUM 100 MG: 100 CAPSULE, LIQUID FILLED ORAL at 09:10

## 2023-10-26 RX ADMIN — DOCUSATE SODIUM 100 MG: 100 CAPSULE, LIQUID FILLED ORAL at 01:10

## 2023-10-26 RX ADMIN — NIFEDIPINE 30 MG: 30 TABLET, FILM COATED, EXTENDED RELEASE ORAL at 08:10

## 2023-10-26 NOTE — PLAN OF CARE
IMM explained to pt and spouse, spouse signed   10/26/23 1517   Medicare Message   Important Message from Medicare regarding Discharge Appeal Rights Given to patient/caregiver;Explained to patient/caregiver;Signed/date by patient/caregiver   Date IMM was signed 10/26/23   Time IMM was signed 0800

## 2023-10-26 NOTE — ASSESSMENT & PLAN NOTE
SBP still a little elevated.  Home Meds:  Nifedipine XL 60mg, Carvedilol 6.25mg bid, Losartan 100mg, HCTZ 25mg  Hospital Meds:  Nifedipine XL 30mg bid, Carvedilol 6.25mg bid  -Hold ARB given RICCI  -Increase Nifedipine XL to 60mg bid and continue current dose of Carvedilol  -Monitor and adjust as needed

## 2023-10-26 NOTE — PROGRESS NOTES
[facility] [address] Medicine  Progress Note    Patient Name: Dwayne Saravia  MRN: [id number]  Patient Class: IP- Inpatient   Admission Date: 10/23/2023  Length of Stay: 3 days  Attending Physician: Sanchez Veras MD  Primary Care Provider: William Mabry MD        Subjective:     Principal Problem:Acute hypoxic respiratory failure      HPI:  Patient is 88-year-old man with history of hypertension, chronic kidney disease stage III, dyslipidemia, chronic obstructive pulmonary disease, prior tobacco smoker (2 packs per week for 15 years), prior stroke with residual right-sided weakness who walks with walker at baseline who presents to the emergency department with respiratory distress with tachypnea new onset hypoxia.  Patient reports feeling well yesterday.  This morning he felt sudden onset of left-sided flank pain while in bed. He reports associated nausea and vomiting.  No dysuria or frequency.  No constipation or diarrhea.  Last bowel movement yesterday which was normal.  No fevers, chills.  No cough or sputum.  No recent fall.  No recent travel or sick contact.      He lives at home with his wife.  No alcohol or illicit drugs.  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  He reports family history of hypertension.        Overview/Hospital Course:  See below      Interval History: Patient is awake and alert this morning.  No adverse events reported overnight.  No chest pain or SOB.  Pox stable on O2 2L NC.  No cough.  No Fevers.  Flank pain is better, but not resolved.  Creatinine slightly improved from yesterday.    Review of Systems   Constitutional:  Negative for chills, diaphoresis, fatigue and fever.   HENT:  Negative for congestion, ear pain, hearing loss and tinnitus.    Eyes:  Negative for photophobia, pain, discharge and visual disturbance.   Respiratory:  Positive for shortness of breath (improving). Negative for cough, chest  tightness and wheezing.    Cardiovascular:  Negative for chest pain, palpitations and leg swelling.   Gastrointestinal:  Negative for abdominal distention, blood in stool, constipation, diarrhea, nausea and vomiting.   Endocrine: Negative for cold intolerance, heat intolerance, polydipsia, polyphagia and polyuria.   Genitourinary:  Positive for flank pain (improving). Negative for dysuria, frequency, hematuria and urgency.   Musculoskeletal:  Negative for arthralgias, back pain, gait problem, myalgias and neck pain.   Skin:  Negative for rash and wound.   Allergic/Immunologic: Negative for environmental allergies, food allergies and immunocompromised state.   Neurological:  Negative for seizures, syncope, facial asymmetry, weakness and headaches.   Psychiatric/Behavioral:  Negative for agitation, behavioral problems, confusion and hallucinations.      Objective:     Vital Signs (Most Recent):  Temp: 96.2 °F (35.7 °C) (10/26/23 0708)  Pulse: 63 (10/26/23 0952)  Resp: 16 (10/26/23 0708)  BP: (!) 171/75 (10/26/23 0709)  SpO2: 96 % (10/26/23 0902) Vital Signs (24h Range):  Temp:  [96.2 °F (35.7 °C)-98.5 °F (36.9 °C)] 96.2 °F (35.7 °C)  Pulse:  [50-72] 63  Resp:  [16-20] 16  SpO2:  [95 %-97 %] 96 %  BP: (136-182)/(75-89) 171/75     Weight: 66.7 kg (147 lb)  Body mass index is 22.35 kg/m².    Intake/Output Summary (Last 24 hours) at 10/26/2023 1035  Last data filed at 10/25/2023 1940  Gross per 24 hour   Intake --   Output 100 ml   Net -100 ml           Physical Exam  Constitutional:       General: He is not in acute distress.     Appearance: He is normal weight. He is not ill-appearing, toxic-appearing or diaphoretic.   HENT:      Head: Normocephalic and atraumatic.      Right Ear: External ear normal.      Left Ear: External ear normal.      Nose: Nose normal.      Mouth/Throat:      Mouth: Mucous membranes are moist.      Pharynx: Oropharynx is clear. No oropharyngeal exudate.   Eyes:      General: No scleral icterus.         Right eye: No discharge.         Left eye: No discharge.      Conjunctiva/sclera: Conjunctivae normal.      Pupils: Pupils are equal, round, and reactive to light.   Neck:      Thyroid: No thyromegaly.      Vascular: No JVD.      Trachea: No tracheal deviation.   Cardiovascular:      Rate and Rhythm: Normal rate and regular rhythm.      Heart sounds: Normal heart sounds. No murmur heard.     No friction rub. No gallop.   Pulmonary:      Effort: Pulmonary effort is normal. No respiratory distress.      Breath sounds: Normal breath sounds. No stridor. No wheezing or rales.   Chest:      Chest wall: No tenderness.   Abdominal:      General: Bowel sounds are normal. There is no distension.      Palpations: Abdomen is soft. There is no mass.      Tenderness: There is no abdominal tenderness. There is no guarding or rebound.   Musculoskeletal:         General: No tenderness. Normal range of motion.      Cervical back: Normal range of motion and neck supple. No rigidity.      Right lower leg: No edema.      Left lower leg: No edema.   Lymphadenopathy:      Cervical: No cervical adenopathy.   Skin:     General: Skin is warm and dry.      Coloration: Skin is not pale.      Findings: No erythema or rash.   Neurological:      Mental Status: He is alert and oriented to person, place, and time.      Cranial Nerves: No cranial nerve deficit.      Motor: No abnormal muscle tone.      Coordination: Coordination normal.      Deep Tendon Reflexes: Reflexes are normal and symmetric. Reflexes normal.      Comments: Patient with residual left-sided weakness, mild dysarthria.  At baseline per family at bedside.   Psychiatric:         Mood and Affect: Mood normal.         Behavior: Behavior normal.         Thought Content: Thought content normal.             Significant Labs: All pertinent labs within the past 24 hours have been reviewed.    Significant Imaging: I have reviewed all pertinent imaging results/findings within the past  24 hours.      Assessment/Plan:      * Acute hypoxic respiratory failure  Patient with history of Emphysema who presented with acute respiratory distress.  Patient reports sudden onset of left-sided flank pain while in bed and associated nausea and vomiting.  No dysuria or frequency.  Pox in 80s on RA.  CXR on admission with question patchy opacities in the medial lung bases, which could relate to atelectasis, aspiration or pneumonia.  CTA of C/A/P with No evidence of thoracic or abdominal aorta dissection.  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  No reports of PE, but study was not a good study to rule out pulmonary embolus per Radiology.  US of LE Veins with no evidence of lower extremity deep venous thrombosis.  TTE on admission with EF of 65-70% and Grade I DD.  Labs on admission with Trop negative x 3, , Lactate normal, Procal negative, VBG pH of 7.347 and pCO2 of 46.8, D-Dimers 2.53.  Patient started on IV Lasix 40mg and IV Heparin given concern for possible PE.  He was also given a dose of Azithromycin in the ED.  SOB improved with diuresis, but still on 2L O2NC with Pox in mid-90s.  Given ongoing hypoxia (possibly due to underlying emphysema) with elevated D-dimer, changed IV Heparin to po Apixaban 2.5mg bid on 10/25/2023 until can rule out PE - VQ scan pending.    Plan:  Follow up with Cardiology recommendations  Holding further Lasix given RICCI  Continue Apixaban 2.5mg bid for now  Followed up V-Q scan results when completed  Continue with O2NC to keep Pox >90  Consider Pulmonary consult if no improvement or worsens.    Acute kidney injury superimposed on CKD  Creatinine on admission was 1.5 from a baseline of around 1.5 (2019).  Creatinine bumped to 1.9 and 2.0 after starting IV Lasix and after CTA of C/A/P with contrast.  Uric Acid elevated at 7.8 and FEUrea 28.5 suggests a pre-renal component.  Urine Eos negative.  US of Kidneys on 10/25/2023 without evidence of obrstuction. Creatinine down to  1.9 today.  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  Since that time he has been having residual left-sided hemiparesis.    -Continue ASA/Statin    Renal hypertension  SBP still a little elevated.  Home Meds:  Nifedipine XL 60mg, Carvedilol 6.25mg bid, Losartan 100mg, HCTZ 25mg  Hospital Meds:  Nifedipine XL 30mg bid, Carvedilol 6.25mg bid  -Hold ARB given RICCI  -Increase Nifedipine XL to 60mg bid and continue current dose of Carvedilol  -Monitor and adjust as needed      Gastroesophageal reflux disease without esophagitis  -Continue with PPI        VTE Risk Mitigation (From admission, onward)         Ordered     apixaban tablet 2.5 mg  2 times daily         10/25/23 1344     IP VTE HIGH RISK PATIENT  Once         10/23/23 1644                Discharge Planning   HAILY:      Code Status: Full Code   Is the patient medically ready for discharge?:     Reason for patient still in hospital (select all that apply): Patient trending condition, Treatment and Consult recommendations  Discharge Plan A: Home with family                  Sanchez Veras MD  Department of Hospital Medicine   [facility] ([address])

## 2023-10-26 NOTE — ASSESSMENT & PLAN NOTE
Creatinine on admission was 1.5 from a baseline of around 1.5 (2019).  Creatinine bumped to 1.9 and 2.0 after starting IV Lasix and after CTA of C/A/P with contrast.  Uric Acid elevated at 7.8 and FEUrea 28.5 suggests a pre-renal component.  Urine Eos negative.  US of Kidneys on 10/25/2023 without evidence of obrstuction. Creatinine down to 1.9 today.  -Hold further Lasix   -Hold ARB  -Renal dose medications and avoid nephrotoxins  -Monitor for improvement  -Consider Nephrology consult if worsens

## 2023-10-26 NOTE — SUBJECTIVE & OBJECTIVE
Interval History: Patient is awake and alert this morning.  No adverse events reported overnight.  No chest pain or SOB.  Pox stable on O2 2L NC.  No cough.  No Fevers.  Flank pain is better, but not resolved.  Creatinine slightly improved from yesterday.    Review of Systems   Constitutional:  Negative for chills, diaphoresis, fatigue and fever.   HENT:  Negative for congestion, ear pain, hearing loss and tinnitus.    Eyes:  Negative for photophobia, pain, discharge and visual disturbance.   Respiratory:  Positive for shortness of breath (improving). Negative for cough, chest tightness and wheezing.    Cardiovascular:  Negative for chest pain, palpitations and leg swelling.   Gastrointestinal:  Negative for abdominal distention, blood in stool, constipation, diarrhea, nausea and vomiting.   Endocrine: Negative for cold intolerance, heat intolerance, polydipsia, polyphagia and polyuria.   Genitourinary:  Positive for flank pain (improving). Negative for dysuria, frequency, hematuria and urgency.   Musculoskeletal:  Negative for arthralgias, back pain, gait problem, myalgias and neck pain.   Skin:  Negative for rash and wound.   Allergic/Immunologic: Negative for environmental allergies, food allergies and immunocompromised state.   Neurological:  Negative for seizures, syncope, facial asymmetry, weakness and headaches.   Psychiatric/Behavioral:  Negative for agitation, behavioral problems, confusion and hallucinations.      Objective:     Vital Signs (Most Recent):  Temp: 96.2 °F (35.7 °C) (10/26/23 0708)  Pulse: 63 (10/26/23 0952)  Resp: 16 (10/26/23 0708)  BP: (!) 171/75 (10/26/23 0709)  SpO2: 96 % (10/26/23 0902) Vital Signs (24h Range):  Temp:  [96.2 °F (35.7 °C)-98.5 °F (36.9 °C)] 96.2 °F (35.7 °C)  Pulse:  [50-72] 63  Resp:  [16-20] 16  SpO2:  [95 %-97 %] 96 %  BP: (136-182)/(75-89) 171/75     Weight: 66.7 kg (147 lb)  Body mass index is 22.35 kg/m².    Intake/Output Summary (Last 24 hours) at 10/26/2023  1035  Last data filed at 10/25/2023 1940  Gross per 24 hour   Intake --   Output 100 ml   Net -100 ml           Physical Exam  Constitutional:       General: He is not in acute distress.     Appearance: He is normal weight. He is not ill-appearing, toxic-appearing or diaphoretic.   HENT:      Head: Normocephalic and atraumatic.      Right Ear: External ear normal.      Left Ear: External ear normal.      Nose: Nose normal.      Mouth/Throat:      Mouth: Mucous membranes are moist.      Pharynx: Oropharynx is clear. No oropharyngeal exudate.   Eyes:      General: No scleral icterus.        Right eye: No discharge.         Left eye: No discharge.      Conjunctiva/sclera: Conjunctivae normal.      Pupils: Pupils are equal, round, and reactive to light.   Neck:      Thyroid: No thyromegaly.      Vascular: No JVD.      Trachea: No tracheal deviation.   Cardiovascular:      Rate and Rhythm: Normal rate and regular rhythm.      Heart sounds: Normal heart sounds. No murmur heard.     No friction rub. No gallop.   Pulmonary:      Effort: Pulmonary effort is normal. No respiratory distress.      Breath sounds: Normal breath sounds. No stridor. No wheezing or rales.   Chest:      Chest wall: No tenderness.   Abdominal:      General: Bowel sounds are normal. There is no distension.      Palpations: Abdomen is soft. There is no mass.      Tenderness: There is no abdominal tenderness. There is no guarding or rebound.   Musculoskeletal:         General: No tenderness. Normal range of motion.      Cervical back: Normal range of motion and neck supple. No rigidity.      Right lower leg: No edema.      Left lower leg: No edema.   Lymphadenopathy:      Cervical: No cervical adenopathy.   Skin:     General: Skin is warm and dry.      Coloration: Skin is not pale.      Findings: No erythema or rash.   Neurological:      Mental Status: He is alert and oriented to person, place, and time.      Cranial Nerves: No cranial nerve deficit.       Motor: No abnormal muscle tone.      Coordination: Coordination normal.      Deep Tendon Reflexes: Reflexes are normal and symmetric. Reflexes normal.      Comments: Patient with residual left-sided weakness, mild dysarthria.  At baseline per family at bedside.   Psychiatric:         Mood and Affect: Mood normal.         Behavior: Behavior normal.         Thought Content: Thought content normal.             Significant Labs: All pertinent labs within the past 24 hours have been reviewed.    Significant Imaging: I have reviewed all pertinent imaging results/findings within the past 24 hours.

## 2023-10-26 NOTE — ASSESSMENT & PLAN NOTE
Patient with history of Emphysema who presented with acute respiratory distress.  Patient reports sudden onset of left-sided flank pain while in bed and associated nausea and vomiting.  No dysuria or frequency.  Pox in 80s on RA.  CXR on admission with question patchy opacities in the medial lung bases, which could relate to atelectasis, aspiration or pneumonia.  CTA of C/A/P with No evidence of thoracic or abdominal aorta dissection.  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  No reports of PE, but study was not a good study to rule out pulmonary embolus per Radiology.  US of LE Veins with no evidence of lower extremity deep venous thrombosis.  TTE on admission with EF of 65-70% and Grade I DD.  Labs on admission with Trop negative x 3, , Lactate normal, Procal negative, VBG pH of 7.347 and pCO2 of 46.8, D-Dimers 2.53.  Patient started on IV Lasix 40mg and IV Heparin given concern for possible PE.  He was also given a dose of Azithromycin in the ED.  SOB improved with diuresis, but still on 2L O2NC with Pox in mid-90s.  Given ongoing hypoxia (possibly due to underlying emphysema) with elevated D-dimer, changed IV Heparin to po Apixaban 2.5mg bid on 10/25/2023 until can rule out PE - VQ scan pending.    Plan:  Follow up with Cardiology recommendations  Holding further Lasix given RICCI  Continue Apixaban 2.5mg bid for now  Followed up V-Q scan results when completed  Continue with O2NC to keep Pox >90  Consider Pulmonary consult if no improvement or worsens.

## 2023-10-27 LAB
ANION GAP SERPL CALC-SCNC: 10 MMOL/L (ref 8–16)
APTT PPP: 31.4 SEC (ref 21–32)
BASOPHILS # BLD AUTO: 0.01 K/UL (ref 0–0.2)
BASOPHILS NFR BLD: 0.2 % (ref 0–1.9)
BUN SERPL-MCNC: 30 MG/DL (ref 8–23)
CALCIUM SERPL-MCNC: 8.9 MG/DL (ref 8.7–10.5)
CHLORIDE SERPL-SCNC: 107 MMOL/L (ref 95–110)
CO2 SERPL-SCNC: 23 MMOL/L (ref 23–29)
CREAT SERPL-MCNC: 1.8 MG/DL (ref 0.5–1.4)
DIFFERENTIAL METHOD: ABNORMAL
EOSINOPHIL # BLD AUTO: 0.1 K/UL (ref 0–0.5)
EOSINOPHIL NFR BLD: 1.8 % (ref 0–8)
ERYTHROCYTE [DISTWIDTH] IN BLOOD BY AUTOMATED COUNT: 14.6 % (ref 11.5–14.5)
EST. GFR  (NO RACE VARIABLE): 36 ML/MIN/1.73 M^2
GLUCOSE SERPL-MCNC: 110 MG/DL (ref 70–110)
HCT VFR BLD AUTO: 39.5 % (ref 40–54)
HGB BLD-MCNC: 12.8 G/DL (ref 14–18)
IMM GRANULOCYTES # BLD AUTO: 0.02 K/UL (ref 0–0.04)
IMM GRANULOCYTES NFR BLD AUTO: 0.4 % (ref 0–0.5)
LYMPHOCYTES # BLD AUTO: 0.8 K/UL (ref 1–4.8)
LYMPHOCYTES NFR BLD: 16.3 % (ref 18–48)
MAGNESIUM SERPL-MCNC: 2.3 MG/DL (ref 1.6–2.6)
MCH RBC QN AUTO: 30.9 PG (ref 27–31)
MCHC RBC AUTO-ENTMCNC: 32.4 G/DL (ref 32–36)
MCV RBC AUTO: 95 FL (ref 82–98)
MONOCYTES # BLD AUTO: 0.8 K/UL (ref 0.3–1)
MONOCYTES NFR BLD: 15.3 % (ref 4–15)
NEUTROPHILS # BLD AUTO: 3.3 K/UL (ref 1.8–7.7)
NEUTROPHILS NFR BLD: 66 % (ref 38–73)
NRBC BLD-RTO: 0 /100 WBC
PHOSPHATE SERPL-MCNC: 3.4 MG/DL (ref 2.7–4.5)
PLATELET # BLD AUTO: 254 K/UL (ref 150–450)
PMV BLD AUTO: 11.2 FL (ref 9.2–12.9)
POTASSIUM SERPL-SCNC: 4.4 MMOL/L (ref 3.5–5.1)
RBC # BLD AUTO: 4.14 M/UL (ref 4.6–6.2)
SODIUM SERPL-SCNC: 140 MMOL/L (ref 136–145)
WBC # BLD AUTO: 5.04 K/UL (ref 3.9–12.7)

## 2023-10-27 PROCEDURE — 25000003 PHARM REV CODE 250: Performed by: INTERNAL MEDICINE

## 2023-10-27 PROCEDURE — 21400001 HC TELEMETRY ROOM

## 2023-10-27 PROCEDURE — 85730 THROMBOPLASTIN TIME PARTIAL: CPT | Performed by: INTERNAL MEDICINE

## 2023-10-27 PROCEDURE — 83735 ASSAY OF MAGNESIUM: CPT | Performed by: HOSPITALIST

## 2023-10-27 PROCEDURE — 84100 ASSAY OF PHOSPHORUS: CPT | Performed by: HOSPITALIST

## 2023-10-27 PROCEDURE — 63600175 PHARM REV CODE 636 W HCPCS: Performed by: HOSPITALIST

## 2023-10-27 PROCEDURE — 27000221 HC OXYGEN, UP TO 24 HOURS

## 2023-10-27 PROCEDURE — 99233 SBSQ HOSP IP/OBS HIGH 50: CPT | Mod: ,,, | Performed by: INTERNAL MEDICINE

## 2023-10-27 PROCEDURE — 63600175 PHARM REV CODE 636 W HCPCS: Performed by: INTERNAL MEDICINE

## 2023-10-27 PROCEDURE — 99232 SBSQ HOSP IP/OBS MODERATE 35: CPT | Mod: ,,, | Performed by: INTERNAL MEDICINE

## 2023-10-27 PROCEDURE — 80048 BASIC METABOLIC PNL TOTAL CA: CPT | Performed by: HOSPITALIST

## 2023-10-27 PROCEDURE — 99232 PR SUBSEQUENT HOSPITAL CARE,LEVL II: ICD-10-PCS | Mod: ,,, | Performed by: INTERNAL MEDICINE

## 2023-10-27 PROCEDURE — 99900035 HC TECH TIME PER 15 MIN (STAT)

## 2023-10-27 PROCEDURE — C9113 INJ PANTOPRAZOLE SODIUM, VIA: HCPCS | Performed by: HOSPITALIST

## 2023-10-27 PROCEDURE — 99233 PR SUBSEQUENT HOSPITAL CARE,LEVL III: ICD-10-PCS | Mod: ,,, | Performed by: INTERNAL MEDICINE

## 2023-10-27 PROCEDURE — 85025 COMPLETE CBC W/AUTO DIFF WBC: CPT | Performed by: HOSPITALIST

## 2023-10-27 PROCEDURE — 25000003 PHARM REV CODE 250: Performed by: HOSPITALIST

## 2023-10-27 PROCEDURE — 36415 COLL VENOUS BLD VENIPUNCTURE: CPT | Performed by: INTERNAL MEDICINE

## 2023-10-27 PROCEDURE — 94761 N-INVAS EAR/PLS OXIMETRY MLT: CPT

## 2023-10-27 RX ORDER — HEPARIN SODIUM 5000 [USP'U]/ML
5000 INJECTION, SOLUTION INTRAVENOUS; SUBCUTANEOUS EVERY 8 HOURS
Status: DISCONTINUED | OUTPATIENT
Start: 2023-10-27 | End: 2023-10-29 | Stop reason: HOSPADM

## 2023-10-27 RX ADMIN — HEPARIN SODIUM 5000 UNITS: 5000 INJECTION INTRAVENOUS; SUBCUTANEOUS at 03:10

## 2023-10-27 RX ADMIN — CARVEDILOL 6.25 MG: 6.25 TABLET, FILM COATED ORAL at 09:10

## 2023-10-27 RX ADMIN — HEPARIN SODIUM 5000 UNITS: 5000 INJECTION INTRAVENOUS; SUBCUTANEOUS at 09:10

## 2023-10-27 RX ADMIN — ATORVASTATIN CALCIUM 40 MG: 20 TABLET, FILM COATED ORAL at 09:10

## 2023-10-27 RX ADMIN — PANTOPRAZOLE SODIUM 40 MG: 40 INJECTION, POWDER, FOR SOLUTION INTRAVENOUS at 09:10

## 2023-10-27 RX ADMIN — DOCUSATE SODIUM 100 MG: 100 CAPSULE, LIQUID FILLED ORAL at 09:10

## 2023-10-27 RX ADMIN — NIFEDIPINE 60 MG: 30 TABLET, FILM COATED, EXTENDED RELEASE ORAL at 09:10

## 2023-10-27 RX ADMIN — ASPIRIN 81 MG CHEWABLE TABLET 81 MG: 81 TABLET CHEWABLE at 09:10

## 2023-10-27 NOTE — PLAN OF CARE
Problem: Adult Inpatient Plan of Care  Goal: Plan of Care Review  Outcome: Ongoing, Progressing  Goal: Patient-Specific Goal (Individualized)  Outcome: Ongoing, Progressing  Goal: Absence of Hospital-Acquired Illness or Injury  Outcome: Ongoing, Progressing  Goal: Optimal Comfort and Wellbeing  Outcome: Ongoing, Progressing  Goal: Readiness for Transition of Care  Outcome: Ongoing, Progressing     Problem: Fall Injury Risk  Goal: Absence of Fall and Fall-Related Injury  Outcome: Ongoing, Progressing     Problem: Fluid and Electrolyte Imbalance (Acute Kidney Injury/Impairment)  Goal: Fluid and Electrolyte Balance  Outcome: Ongoing, Progressing     Problem: Oral Intake Inadequate (Acute Kidney Injury/Impairment)  Goal: Optimal Nutrition Intake  Outcome: Ongoing, Progressing     Problem: Renal Function Impairment (Acute Kidney Injury/Impairment)  Goal: Effective Renal Function  Outcome: Ongoing, Progressing     Problem: Gas Exchange Impaired  Goal: Optimal Gas Exchange  Outcome: Ongoing, Progressing

## 2023-10-27 NOTE — SUBJECTIVE & OBJECTIVE
Interval History: Patient is awake and alert this morning.  No adverse events reported overnight.  No chest pain or SOB.  Pox stable on O2 2L NC.  No cough.  No Fevers.  Flank pain is better, but not resolved.  Creatinine slightly improved from yesterday.  Possible discharge tomorrow.    Review of Systems   Constitutional:  Negative for chills, diaphoresis, fatigue and fever.   HENT:  Negative for congestion, ear pain, hearing loss and tinnitus.    Eyes:  Negative for photophobia, pain, discharge and visual disturbance.   Respiratory:  Positive for shortness of breath (improving). Negative for cough, chest tightness and wheezing.    Cardiovascular:  Negative for chest pain, palpitations and leg swelling.   Gastrointestinal:  Negative for abdominal distention, blood in stool, constipation, diarrhea, nausea and vomiting.   Endocrine: Negative for cold intolerance, heat intolerance, polydipsia, polyphagia and polyuria.   Genitourinary:  Positive for flank pain (improving). Negative for dysuria, frequency, hematuria and urgency.   Musculoskeletal:  Negative for arthralgias, back pain, gait problem, myalgias and neck pain.   Skin:  Negative for rash and wound.   Allergic/Immunologic: Negative for environmental allergies, food allergies and immunocompromised state.   Neurological:  Negative for seizures, syncope, facial asymmetry, weakness and headaches.   Psychiatric/Behavioral:  Negative for agitation, behavioral problems, confusion and hallucinations.      Objective:     Vital Signs (Most Recent):  Temp: 98.1 °F (36.7 °C) (10/27/23 0712)  Pulse: 66 (10/27/23 0712)  Resp: 16 (10/27/23 0712)  BP: (!) 168/88 (10/27/23 0712)  SpO2: 97 % (10/27/23 0712) Vital Signs (24h Range):  Temp:  [96.4 °F (35.8 °C)-98.1 °F (36.7 °C)] 98.1 °F (36.7 °C)  Pulse:  [51-66] 66  Resp:  [16-18] 16  SpO2:  [92 %-100 %] 97 %  BP: (141-168)/(74-88) 168/88     Weight: 66.7 kg (147 lb)  Body mass index is 22.35 kg/m².    Intake/Output Summary  (Last 24 hours) at 10/27/2023 1020  Last data filed at 10/27/2023 0609  Gross per 24 hour   Intake 900 ml   Output 850 ml   Net 50 ml           Physical Exam  Constitutional:       General: He is not in acute distress.     Appearance: He is normal weight. He is not ill-appearing, toxic-appearing or diaphoretic.   HENT:      Head: Normocephalic and atraumatic.      Right Ear: External ear normal.      Left Ear: External ear normal.      Nose: Nose normal.      Mouth/Throat:      Mouth: Mucous membranes are moist.      Pharynx: Oropharynx is clear. No oropharyngeal exudate.   Eyes:      General: No scleral icterus.        Right eye: No discharge.         Left eye: No discharge.      Conjunctiva/sclera: Conjunctivae normal.      Pupils: Pupils are equal, round, and reactive to light.   Neck:      Thyroid: No thyromegaly.      Vascular: No JVD.      Trachea: No tracheal deviation.   Cardiovascular:      Rate and Rhythm: Normal rate and regular rhythm.      Heart sounds: Normal heart sounds. No murmur heard.     No friction rub. No gallop.   Pulmonary:      Effort: Pulmonary effort is normal. No respiratory distress.      Breath sounds: Normal breath sounds. No stridor. No wheezing or rales.   Chest:      Chest wall: No tenderness.   Abdominal:      General: Bowel sounds are normal. There is no distension.      Palpations: Abdomen is soft. There is no mass.      Tenderness: There is no abdominal tenderness. There is no guarding or rebound.   Musculoskeletal:         General: No tenderness. Normal range of motion.      Cervical back: Normal range of motion and neck supple. No rigidity.      Right lower leg: No edema.      Left lower leg: No edema.   Lymphadenopathy:      Cervical: No cervical adenopathy.   Skin:     General: Skin is warm and dry.      Coloration: Skin is not pale.      Findings: No erythema or rash.   Neurological:      Mental Status: He is alert and oriented to person, place, and time.      Cranial  Nerves: No cranial nerve deficit.      Motor: No abnormal muscle tone.      Coordination: Coordination normal.      Deep Tendon Reflexes: Reflexes are normal and symmetric. Reflexes normal.      Comments: Patient with residual left-sided weakness, mild dysarthria.  At baseline per family at bedside.   Psychiatric:         Mood and Affect: Mood normal.         Behavior: Behavior normal.         Thought Content: Thought content normal.             Significant Labs: All pertinent labs within the past 24 hours have been reviewed.    Significant Imaging: I have reviewed all pertinent imaging results/findings within the past 24 hours.

## 2023-10-27 NOTE — PROGRESS NOTES
[facility] Surg [address] Medicine  Progress Note    Patient Name: Dwayne Saravia  MRN: [id number]  Patient Class: IP- Inpatient   Admission Date: 10/23/2023  Length of Stay: 4 days  Attending Physician: Sanchez Veras MD  Primary Care Provider: William Mabry MD        Subjective:     Principal Problem:Acute hypoxic respiratory failure      HPI:  Patient is 88-year-old man with history of hypertension, chronic kidney disease stage III, dyslipidemia, chronic obstructive pulmonary disease, prior tobacco smoker (2 packs per week for 15 years), prior stroke with residual right-sided weakness who walks with walker at baseline who presents to the emergency department with respiratory distress with tachypnea new onset hypoxia.  Patient reports feeling well yesterday.  This morning he felt sudden onset of left-sided flank pain while in bed. He reports associated nausea and vomiting.  No dysuria or frequency.  No constipation or diarrhea.  Last bowel movement yesterday which was normal.  No fevers, chills.  No cough or sputum.  No recent fall.  No recent travel or sick contact.      He lives at home with his wife.  No alcohol or illicit drugs.  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  He reports family history of hypertension.        Overview/Hospital Course:  See below      Interval History: Patient is awake and alert this morning.  No adverse events reported overnight.  No chest pain or SOB.  Pox stable on O2 2L NC.  No cough.  No Fevers.  Flank pain is better, but not resolved.  Creatinine slightly improved from yesterday.  Possible discharge tomorrow.    Review of Systems   Constitutional:  Negative for chills, diaphoresis, fatigue and fever.   HENT:  Negative for congestion, ear pain, hearing loss and tinnitus.    Eyes:  Negative for photophobia, pain, discharge and visual disturbance.   Respiratory:  Positive for shortness of breath (improving).  Negative for cough, chest tightness and wheezing.    Cardiovascular:  Negative for chest pain, palpitations and leg swelling.   Gastrointestinal:  Negative for abdominal distention, blood in stool, constipation, diarrhea, nausea and vomiting.   Endocrine: Negative for cold intolerance, heat intolerance, polydipsia, polyphagia and polyuria.   Genitourinary:  Positive for flank pain (improving). Negative for dysuria, frequency, hematuria and urgency.   Musculoskeletal:  Negative for arthralgias, back pain, gait problem, myalgias and neck pain.   Skin:  Negative for rash and wound.   Allergic/Immunologic: Negative for environmental allergies, food allergies and immunocompromised state.   Neurological:  Negative for seizures, syncope, facial asymmetry, weakness and headaches.   Psychiatric/Behavioral:  Negative for agitation, behavioral problems, confusion and hallucinations.      Objective:     Vital Signs (Most Recent):  Temp: 98.1 °F (36.7 °C) (10/27/23 0712)  Pulse: 66 (10/27/23 0712)  Resp: 16 (10/27/23 0712)  BP: (!) 168/88 (10/27/23 0712)  SpO2: 97 % (10/27/23 0712) Vital Signs (24h Range):  Temp:  [96.4 °F (35.8 °C)-98.1 °F (36.7 °C)] 98.1 °F (36.7 °C)  Pulse:  [51-66] 66  Resp:  [16-18] 16  SpO2:  [92 %-100 %] 97 %  BP: (141-168)/(74-88) 168/88     Weight: 66.7 kg (147 lb)  Body mass index is 22.35 kg/m².    Intake/Output Summary (Last 24 hours) at 10/27/2023 1020  Last data filed at 10/27/2023 0609  Gross per 24 hour   Intake 900 ml   Output 850 ml   Net 50 ml           Physical Exam  Constitutional:       General: He is not in acute distress.     Appearance: He is normal weight. He is not ill-appearing, toxic-appearing or diaphoretic.   HENT:      Head: Normocephalic and atraumatic.      Right Ear: External ear normal.      Left Ear: External ear normal.      Nose: Nose normal.      Mouth/Throat:      Mouth: Mucous membranes are moist.      Pharynx: Oropharynx is clear. No oropharyngeal exudate.   Eyes:       General: No scleral icterus.        Right eye: No discharge.         Left eye: No discharge.      Conjunctiva/sclera: Conjunctivae normal.      Pupils: Pupils are equal, round, and reactive to light.   Neck:      Thyroid: No thyromegaly.      Vascular: No JVD.      Trachea: No tracheal deviation.   Cardiovascular:      Rate and Rhythm: Normal rate and regular rhythm.      Heart sounds: Normal heart sounds. No murmur heard.     No friction rub. No gallop.   Pulmonary:      Effort: Pulmonary effort is normal. No respiratory distress.      Breath sounds: Normal breath sounds. No stridor. No wheezing or rales.   Chest:      Chest wall: No tenderness.   Abdominal:      General: Bowel sounds are normal. There is no distension.      Palpations: Abdomen is soft. There is no mass.      Tenderness: There is no abdominal tenderness. There is no guarding or rebound.   Musculoskeletal:         General: No tenderness. Normal range of motion.      Cervical back: Normal range of motion and neck supple. No rigidity.      Right lower leg: No edema.      Left lower leg: No edema.   Lymphadenopathy:      Cervical: No cervical adenopathy.   Skin:     General: Skin is warm and dry.      Coloration: Skin is not pale.      Findings: No erythema or rash.   Neurological:      Mental Status: He is alert and oriented to person, place, and time.      Cranial Nerves: No cranial nerve deficit.      Motor: No abnormal muscle tone.      Coordination: Coordination normal.      Deep Tendon Reflexes: Reflexes are normal and symmetric. Reflexes normal.      Comments: Patient with residual left-sided weakness, mild dysarthria.  At baseline per family at bedside.   Psychiatric:         Mood and Affect: Mood normal.         Behavior: Behavior normal.         Thought Content: Thought content normal.             Significant Labs: All pertinent labs within the past 24 hours have been reviewed.    Significant Imaging: I have reviewed all pertinent imaging  results/findings within the past 24 hours.      Assessment/Plan:      * Acute hypoxic respiratory failure  Patient with history of Emphysema who presented with acute respiratory distress.  Patient reports sudden onset of left-sided flank pain while in bed and associated nausea and vomiting.  No dysuria or frequency.  Pox in 80s on RA.  CXR on admission with question patchy opacities in the medial lung bases, which could relate to atelectasis, aspiration or pneumonia.  CTA of C/A/P with No evidence of thoracic or abdominal aorta dissection.  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  No reports of PE, but study was not a good study to rule out pulmonary embolus per Radiology.  US of LE Veins with no evidence of lower extremity deep venous thrombosis.  TTE on admission with EF of 65-70% and Grade I DD.  Labs on admission with Trop negative x 3, , Lactate normal, Procal negative, VBG pH of 7.347 and pCO2 of 46.8, D-Dimers 2.53.  Patient started on IV Lasix 40mg and IV Heparin given concern for possible PE.  He was also given a dose of Azithromycin in the ED.  SOB improved with diuresis, but still on 2L O2NC with Pox in mid-90s.  Given ongoing hypoxia (possibly due to underlying emphysema) with elevated D-dimer, changed IV Heparin to po Apixaban 2.5mg bid on 10/25/2023 until can rule out PE - VQ scan normal on 10/26/2023, so will discontinue full anticoagulation and resume DVT prophylaxis.    Plan:  Follow up with Cardiology recommendations  Holding further Lasix given RICCI  Discontinue Apixaban as above and start SQH for DVT prophylaxis  Continue with O2NC to keep Pox >90 - check 6MWT for home O2  Pulmonology follow up on discharge    Acute kidney injury superimposed on CKD  Creatinine on admission was 1.5 from a baseline of around 1.5 (2019).  Creatinine bumped to 1.9 and 2.0 after starting IV Lasix and after CTA of C/A/P with contrast.  Uric Acid elevated at 7.8 and FEUrea 28.5 suggests a pre-renal component.   Urine Eos negative.  US of Kidneys on 10/25/2023 without evidence of obrstuction. Creatinine down to 1.8 today.  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  Since that time he has been having residual left-sided hemiparesis.    -Continue ASA/Statin    Renal hypertension  SBP improved, but still a little elevated.  Home Meds:  Nifedipine XL 60mg, Carvedilol 6.25mg bid, Losartan 100mg, HCTZ 25mg  Hospital Meds:  Nifedipine XL 60mg bid, Carvedilol 6.25mg bid  -Hold ARB given RICCI  -Continue current regimen  -Monitor and adjust as needed      Gastroesophageal reflux disease without esophagitis  -Continue with PPI        VTE Risk Mitigation (From admission, onward)         Ordered     heparin (porcine) injection 5,000 Units  Every 8 hours         10/27/23 1022     IP VTE HIGH RISK PATIENT  Once         10/23/23 1644                Discharge Planning   HAILY:      Code Status: Full Code   Is the patient medically ready for discharge?:     Reason for patient still in hospital (select all that apply): Patient trending condition, Treatment and Consult recommendations  Discharge Plan A: Home with family                  Sanchez Veras MD  Department of Hospital Medicine   [facility] Surg ([address])

## 2023-10-27 NOTE — PROGRESS NOTES
"    Cardiology Progress Note    10/27/2023  9:01 AM    Subjective/Interim:      No complaints.  Patient states he feels great today.  His creatinine has improved to 1.8.     Objective:   24-hour Vitals:  Temp:  [96.4 °F (35.8 °C)-98.1 °F (36.7 °C)] 97.2 °F (36.2 °C)  Pulse:  [51-66] 53  Resp:  [16-18] 16  SpO2:  [92 %-100 %] 95 %  BP: (133-168)/(70-88) 133/70    Physical Examination:  Vitals: /70 (BP Location: Left arm, Patient Position: Sitting)   Pulse (!) 53   Temp 97.2 °F (36.2 °C) (Oral)   Resp 16   Ht 5' 8" (1.727 m)   Wt 66.7 kg (147 lb)   SpO2 95%   BMI 22.35 kg/m²     Physical Exam  Vitals reviewed.   Neck:      Vascular: No JVD.   Cardiovascular:      Rate and Rhythm: Normal rate and regular rhythm.      Pulses:           Carotid pulses are 2+ on the right side and 2+ on the left side.       Radial pulses are 2+ on the right side and 2+ on the left side.      Heart sounds: S1 normal and S2 normal.   Pulmonary:      Breath sounds: Normal breath sounds and air entry.   Musculoskeletal:      Right lower leg: No edema.      Left lower leg: No edema.   Neurological:      Mental Status: He is alert.           Intake/Output Summary (Last 24 hours) at 10/27/2023 1115  Last data filed at 10/27/2023 0609  Gross per 24 hour   Intake 900 ml   Output 850 ml   Net 50 ml         Laboratory:  Trended Lab Data:  Recent Labs   Lab 10/25/23  0420 10/26/23  0500 10/27/23  0429   WBC 5.90 5.61 5.04   HGB 12.5* 12.6* 12.8*   HCT 38.7* 39.1* 39.5*    222 254         Recent Labs   Lab 10/25/23  0420 10/26/23  0500 10/27/23  0429    139 140   K 4.4 4.6 4.4    107 107   CO2 23 24 23   BUN 30* 33* 30*    93 110   CALCIUM 8.9 8.6* 8.9   MG 2.1 2.1 2.3   PHOS 3.6 3.6 3.4         Recent Labs   Lab 10/23/23  1126   PROT 7.8   ALBUMIN 3.7   BILITOT 0.6   AST 23   ALT 14   ALKPHOS 107         Recent Labs   Lab 10/23/23  1724   INR 1.0         Cardiac:   Recent Labs   Lab 10/23/23  1154 10/23/23  1539 " 10/23/23  1722 10/24/23  0608   TROPONINI  --  0.007 <0.006 0.018   *  --   --   --          FLP:   Lab Results   Component Value Date    CHOL 122 05/10/2021    HDL 41 05/10/2021    LDLCALC 61.6 (L) 05/10/2021    TRIG 97 05/10/2021    CHOLHDL 33.6 05/10/2021     DM:   Lab Results   Component Value Date    HGBA1C 5.7 (H) 10/24/2023    HGBA1C 6.1 (H) 08/28/2020    LDLCALC 61.6 (L) 05/10/2021    CREATININE 1.8 (H) 10/27/2023     Thyroid:   Lab Results   Component Value Date    TSH 1.354 08/28/2020     Anemia:   Lab Results   Component Value Date    IRON 101 11/20/2008    TIBC 291 11/20/2008    FERRITIN 462 (H) 11/20/2008    IKJCGVBQ05 698 11/20/2008    FOLATE 16.2 11/20/2008     Urinalysis:   Lab Results   Component Value Date    LABURIN NO GROWTH AFTER 48 HOURS. 11/19/2008    COLORU Yellow 10/23/2023    SPECGRAV >1.030 (A) 10/23/2023    NITRITE Negative 10/23/2023    KETONESU Negative 10/23/2023    UROBILINOGEN Negative 10/23/2023     @      Other Results:  EKG (my interpretation):    TELEMETRY:  sinus    Echo:   Results for orders placed or performed during the hospital encounter of 10/23/23   Echo   Result Value Ref Range    BSA 1.79 m2    LA WIDTH 3.4 cm    Left Atrium Area-systolic Apical Two Chamber 17.0 cm2    Left Atrium Area-systolic Four Chamber 17.0 cm2    TAPSE 1.80 cm    RA Width 3.0 cm    LVOT stroke volume 84.77 cm3    LVIDd 3.92 3.5 - 6.0 cm    LV Systolic Volume 17.17 mL    LV Systolic Volume Index 9.6 mL/m2    LVIDs 2.25 2.1 - 4.0 cm    LV Diastolic Volume 66.79 mL    LV Diastolic Volume Index 37.31 mL/m2    IVS 1.41 (A) 0.6 - 1.1 cm    LVOT diameter 1.91 cm    LVOT area 2.9 cm2    FS 43 28 - 44 %    Left Ventricle Relative Wall Thickness 0.66 cm    Posterior Wall 1.29 (A) 0.6 - 1.1 cm    LV mass 191.86 g    LV Mass Index 107 g/m2    MV Peak E Brody 0.81 m/s    TDI LATERAL 0.03 m/s    TDI SEPTAL 0.04 m/s    E/E' ratio 23.14 m/s    MV Peak A Brody 1.15 m/s    TR Max Brody 2.91 m/s    E/A ratio 0.70      IVRT 188.39 msec    E wave deceleration time 352.82 msec    LV SEPTAL E/E' RATIO 20.25 m/s    LA Volume Index 21.7 mL/m2    LV LATERAL E/E' RATIO 27.00 m/s    LA volume 38.83 cm3    LVOT peak palomo 1.43 m/s    Left Ventricular Outflow Tract Mean Velocity 0.92 cm/s    Left Ventricular Outflow Tract Mean Gradient 4.00 mmHg    LA size 2.61 cm    Left Atrium Minor Axis 5.35 cm    Left Atrium Major Axis 4.96 cm    RVDD 2.20 cm    RV S' 18.80 cm/s    RA Major Axis 3.84 cm    AV mean gradient 6 mmHg    AV peak gradient 11 mmHg    Ao peak palomo 1.67 m/s    Ao VTI 34.00 cm    LVOT peak VTI 29.60 cm    AV valve area 2.49 cm²    AV Velocity Ratio 0.86     AV index (prosthetic) 0.87     BENNIE by Velocity Ratio 2.45 cm²    Mr max palomo 0.03 m/s    MV stenosis pressure 1/2 time 102.32 ms    MV valve area p 1/2 method 2.15 cm2    Triscuspid Valve Regurgitation Peak Gradient 34 mmHg    PV PEAK VELOCITY 0.95 m/s    PV peak gradient 4 mmHg    Pulmonary Valve Mean Velocity 0.65 m/s    Ao root annulus 2.94 cm    STJ 3.02 cm    Ascending aorta 3.13 cm    IVC diameter 1.85 cm    Mean e' 0.04 m/s    ZLVIDS -2.43     ZLVIDD -2.33     Narrative      Left Ventricle: The left ventricle is normal in size. Moderately   increased wall thickness. Normal wall motion. There is normal systolic   function with a visually estimated ejection fraction of 65 - 70%. Grade I   diastolic dysfunction.    Right Ventricle: Normal right ventricular cavity size. Systolic   function is normal.    Aortic Valve: There is mild aortic valve sclerosis.    Mitral Valve: There is no stenosis. There is no significant   regurgitation.    Tricuspid Valve: There is normal leaflet mobility. There is no   significant regurgitation.         Radiology:  US Retroperitoneal Complete    Result Date: 10/25/2023  EXAMINATION: US RETROPERITONEAL COMPLETE CLINICAL HISTORY: joaquim; TECHNIQUE: Ultrasound of the kidneys and urinary bladder was performed including color flow and Doppler  evaluation of the kidneys. COMPARISON: None. FINDINGS: Right kidney: The right kidney measures 10.7 cm. No cortical thinning. No loss of corticomedullary distinction. Resistive index measures 0.73.  Approximately 3 cysts, largest measuring 4.1 cm.  The 2 smaller cysts both have thin internal septations.  No renal stone. No hydronephrosis. Left kidney: The left kidney measures 13.8 cm, with measurement including 1 of the larger cysts.  No cortical thinning. No loss of corticomedullary distinction. Resistive index measures 0.71.  Two large cysts.  Largest in the lower pole measures 7 cm and contains a mildly thickened septation.  No renal stone. No hydronephrosis. The bladder is partially distended at the time of scanning and has an unremarkable appearance.     No acute process seen. Bilateral simple and complex renal cysts. Electronically signed by: Mercedes Drake Date:    10/25/2023 Time:    09:07    Echo    Result Date: 10/24/2023    Left Ventricle: The left ventricle is normal in size. Moderately increased wall thickness. Normal wall motion. There is normal systolic function with a visually estimated ejection fraction of 65 - 70%. Grade I diastolic dysfunction.   Right Ventricle: Normal right ventricular cavity size. Systolic function is normal.   Aortic Valve: There is mild aortic valve sclerosis.   Mitral Valve: There is no stenosis. There is no significant regurgitation.   Tricuspid Valve: There is normal leaflet mobility. There is no significant regurgitation.     US Lower Extremity Veins Bilateral    Result Date: 10/23/2023  EXAMINATION: US LOWER EXTREMITY VEINS BILATERAL CLINICAL HISTORY: Rule out DVT; TECHNIQUE: Duplex and color flow Doppler evaluation of the bilateral lower extremity veins was performed. COMPARISON: None FINDINGS: No evidence of clot involving the bilateral common femoral, greater saphenous, femoral, popliteal, peroneal, anterior and posterior tibial veins.  All venous structures  demonstrate normal respiratory phasicity and augment adequately.  No evidence of soft tissue mass or Baker's cyst.     No evidence of lower extremity deep venous thrombosis. Electronically signed by: Dago Simms MD Date:    10/23/2023 Time:    17:53    CTA Chest Abdomen Pelvis    Result Date: 10/23/2023  EXAMINATION: CTA CHEST ABDOMEN PELVIS CLINICAL HISTORY: Aortic dissection suspected;flank pain with ekg changes; TECHNIQUE: 1.25 mm axial images of the chest, thoracic and abdominal aorta were obtained after the administration of 100 cc of Omni 350 IV contrast, coronal and sagittal images reformatted. COMPARISON: None FINDINGS: Pre contrast images demonstrate no hyperattenuating mural thrombus to suggest an acute dissection. The thoracic aorta is of normal caliber and demonstrates mild atherosclerotic plaque.  The ascending aorta measures approximately 3.8 cm, the descending thoracic aorta measures 3.5 cm. The abdominal aorta is aneurysmal measuring 3.1 cm in its infrarenal region. The celiac trunk, SMA, bilateral renal arteries and KAITLYN are patent.  The bilateral iliac and femoral vessels are patent. The airways are patent.  The thyroid gland appears normal.  No mediastinal or hilar lymphadenopathy.  The cardiac silhouette is slightly prominent.  Coronary artery calcifications seen.  No pericardial effusion.  The esophagus course normally. Emphysema noted.  There is mild atelectasis at the bilateral lung basis.  No pleural effusion.  The axillary regions appear normal. No liver lesions.  The gallbladder is present, no radiopaque stones seen within.  Small hiatal hernia.  The remainder of the stomach appears normal.  The pancreas, spleen and bilateral adrenal glands appear normal.  Bilateral low attenuating kidney lesions, the larger ones are likely cysts, some lesions are too small to be characterized.  No hydronephrosis.  The bilateral ureters appear normal.  No para-aortic lymphadenopathy.  Mild stool  retention. No inflammatory changes of the bowel seen, no bowel dilation. The appendix is normal.  The bladder appears normal. The prostate is mildly enlarged. The inguinal regions appear normal. The osseous structures demonstrate no osseous lesions.     No evidence of thoracic or abdominal aorta dissection. 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 Bilateral kidney lesion, the largest 1 appear to represent, the smaller ones are too small to be characterized. Small hiatal hernia. Electronically signed by: Kimberly Harper MD Date:    10/23/2023 Time:    13:46    X-Ray Chest AP Portable    Result Date: 10/23/2023  EXAMINATION: XR CHEST AP PORTABLE CLINICAL HISTORY: Tachypnea, not elsewhere classified TECHNIQUE: Single frontal view of the chest was performed. COMPARISON: Chest radiograph 09/30/2022. FINDINGS: Monitoring leads overlie the chest. Grossly unchanged cardiomediastinal contours, noting enlargement of the cardiac silhouette. Question patchy opacities in medial lung bases. No definite pneumothorax or large volume pleural effusion.  Nonspecific increased attenuation in the right paramedian upper mediastinum stable dating back to at least 11/17/2008 radiograph. No definite acute findings in the visualized abdomen. Osseous and soft tissue structures without definite acute change.     Question patchy opacities in the medial lung bases, which could relate to atelectasis, aspiration or pneumonia. Electronically signed by: Eddie Corbin Date:    10/23/2023 Time:    12:38        Current Medications:     Infusions:         Scheduled:   aspirin  81 mg Oral Daily    atorvastatin  40 mg Oral Daily    carvediloL  6.25 mg Oral BID    docusate sodium  100 mg Oral BID    heparin (porcine)  5,000 Units Subcutaneous Q8H    NIFEdipine  60 mg Oral BID    pantoprazole  40 mg Intravenous Daily        PRN:  acetaminophen, morphine, sodium chloride 0.9%     Assessment and Plan:     Dwayne Saravia is a 88 y.o.male with  Acute  hypoxic respiratory failure  Mild acute diastoic CHF responding to diuretics.  Negative 1L.  No ACS.  T waves inversion likely from LVH.  Echo resulted.  -Euvolemic today.  agree with holding lasix.     HTN   Continue meds and monitor     H/o CVA     CKD 3   As per hospital medicine team         Don Mitchell MD        Disclaimer: This document was created using voice recognition software ([employer]). Although it may be edited, this document may contain errors related to incorrect recognition of the spoken word. Please call the physician if clarification is needed.

## 2023-10-27 NOTE — PLAN OF CARE
Problem: Adult Inpatient Plan of Care  Goal: Plan of Care Review  Outcome: Ongoing, Progressing  Goal: Optimal Comfort and Wellbeing  Outcome: Ongoing, Progressing     Problem: Fluid and Electrolyte Imbalance (Acute Kidney Injury/Impairment)  Goal: Fluid and Electrolyte Balance  Outcome: Ongoing, Progressing     Problem: Oral Intake Inadequate (Acute Kidney Injury/Impairment)  Goal: Optimal Nutrition Intake  Outcome: Ongoing, Progressing     Problem: Renal Function Impairment (Acute Kidney Injury/Impairment)  Goal: Effective Renal Function  Outcome: Ongoing, Progressing     Problem: Gas Exchange Impaired  Goal: Optimal Gas Exchange  Outcome: Ongoing, Progressing

## 2023-10-27 NOTE — ASSESSMENT & PLAN NOTE
Creatinine on admission was 1.5 from a baseline of around 1.5 (2019).  Creatinine bumped to 1.9 and 2.0 after starting IV Lasix and after CTA of C/A/P with contrast.  Uric Acid elevated at 7.8 and FEUrea 28.5 suggests a pre-renal component.  Urine Eos negative.  US of Kidneys on 10/25/2023 without evidence of obrstuction. Creatinine down to 1.8 today.  -Hold further Lasix   -Hold ARB  -Renal dose medications and avoid nephrotoxins  -Monitor for improvement

## 2023-10-27 NOTE — ASSESSMENT & PLAN NOTE
Patient with history of Emphysema who presented with acute respiratory distress.  Patient reports sudden onset of left-sided flank pain while in bed and associated nausea and vomiting.  No dysuria or frequency.  Pox in 80s on RA.  CXR on admission with question patchy opacities in the medial lung bases, which could relate to atelectasis, aspiration or pneumonia.  CTA of C/A/P with No evidence of thoracic or abdominal aorta dissection.  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  No reports of PE, but study was not a good study to rule out pulmonary embolus per Radiology.  US of LE Veins with no evidence of lower extremity deep venous thrombosis.  TTE on admission with EF of 65-70% and Grade I DD.  Labs on admission with Trop negative x 3, , Lactate normal, Procal negative, VBG pH of 7.347 and pCO2 of 46.8, D-Dimers 2.53.  Patient started on IV Lasix 40mg and IV Heparin given concern for possible PE.  He was also given a dose of Azithromycin in the ED.  SOB improved with diuresis, but still on 2L O2NC with Pox in mid-90s.  Given ongoing hypoxia (possibly due to underlying emphysema) with elevated D-dimer, changed IV Heparin to po Apixaban 2.5mg bid on 10/25/2023 until can rule out PE - VQ scan normal on 10/26/2023, so will discontinue full anticoagulation and resume DVT prophylaxis.    Plan:  Follow up with Cardiology recommendations  Holding further Lasix given RICCI  Discontinue Apixaban as above and start SQH for DVT prophylaxis  Continue with O2NC to keep Pox >90 - check 6MWT for home O2  Pulmonology follow up on discharge

## 2023-10-27 NOTE — ASSESSMENT & PLAN NOTE
SBP improved, but still a little elevated.  Home Meds:  Nifedipine XL 60mg, Carvedilol 6.25mg bid, Losartan 100mg, HCTZ 25mg  Hospital Meds:  Nifedipine XL 60mg bid, Carvedilol 6.25mg bid  -Hold ARB given RICCI  -Continue current regimen  -Monitor and adjust as needed

## 2023-10-27 NOTE — PROCEDURES
"Instructions for measuring Oxygen Saturation  to qualify for Home Oxygen:     Please obtain and document (REPLACE # [id number] AND COPY AND PASTE TEXT INSIDE QUOTATION MARKS) the following for Home Oxygen:     This must be performed and documented within 2 days of discharge.     "Pulse Oximetry:   93% SpO2 on room air at rest on 10/27/2023                                If 88% or less, STOP and document.     If 89% or more, measure and  document the following:     "Pulse Oximetry:   93% SpO2 on room air at rest on 10/27/2023                             88% SpO2 on room air with activity/exercise on 10/27/2023                          92% SpO2 on 2L oxgyen with activity/excercise on 10/27/2023     (NOTE:  FOR OXYGEN WITH ACTIVITY - MEDICARE WANTS TO SEE THAT THE OXYGEN INCREASES ONCE A PATIENT HAS WALKED AND IS BACK ON THE OXYGEN)\  "

## 2023-10-28 LAB
ANION GAP SERPL CALC-SCNC: 7 MMOL/L (ref 8–16)
ANION GAP SERPL CALC-SCNC: 8 MMOL/L (ref 8–16)
BACTERIA BLD CULT: NORMAL
BASOPHILS # BLD AUTO: 0.01 K/UL (ref 0–0.2)
BASOPHILS NFR BLD: 0.2 % (ref 0–1.9)
BNP SERPL-MCNC: 33 PG/ML (ref 0–99)
BUN SERPL-MCNC: 27 MG/DL (ref 8–23)
BUN SERPL-MCNC: 31 MG/DL (ref 8–23)
CALCIUM SERPL-MCNC: 8.8 MG/DL (ref 8.7–10.5)
CALCIUM SERPL-MCNC: 8.9 MG/DL (ref 8.7–10.5)
CHLORIDE SERPL-SCNC: 107 MMOL/L (ref 95–110)
CHLORIDE SERPL-SCNC: 108 MMOL/L (ref 95–110)
CO2 SERPL-SCNC: 24 MMOL/L (ref 23–29)
CO2 SERPL-SCNC: 25 MMOL/L (ref 23–29)
CREAT SERPL-MCNC: 1.6 MG/DL (ref 0.5–1.4)
CREAT SERPL-MCNC: 1.8 MG/DL (ref 0.5–1.4)
DIFFERENTIAL METHOD: ABNORMAL
EOSINOPHIL # BLD AUTO: 0.1 K/UL (ref 0–0.5)
EOSINOPHIL NFR BLD: 2.2 % (ref 0–8)
ERYTHROCYTE [DISTWIDTH] IN BLOOD BY AUTOMATED COUNT: 14.6 % (ref 11.5–14.5)
EST. GFR  (NO RACE VARIABLE): 36 ML/MIN/1.73 M^2
EST. GFR  (NO RACE VARIABLE): 41 ML/MIN/1.73 M^2
GLUCOSE SERPL-MCNC: 105 MG/DL (ref 70–110)
GLUCOSE SERPL-MCNC: 85 MG/DL (ref 70–110)
HCT VFR BLD AUTO: 36.9 % (ref 40–54)
HGB BLD-MCNC: 12 G/DL (ref 14–18)
IMM GRANULOCYTES # BLD AUTO: 0.02 K/UL (ref 0–0.04)
IMM GRANULOCYTES NFR BLD AUTO: 0.4 % (ref 0–0.5)
LYMPHOCYTES # BLD AUTO: 0.9 K/UL (ref 1–4.8)
LYMPHOCYTES NFR BLD: 18.7 % (ref 18–48)
MAGNESIUM SERPL-MCNC: 2.2 MG/DL (ref 1.6–2.6)
MCH RBC QN AUTO: 30.9 PG (ref 27–31)
MCHC RBC AUTO-ENTMCNC: 32.5 G/DL (ref 32–36)
MCV RBC AUTO: 95 FL (ref 82–98)
MONOCYTES # BLD AUTO: 0.8 K/UL (ref 0.3–1)
MONOCYTES NFR BLD: 16.3 % (ref 4–15)
NEUTROPHILS # BLD AUTO: 3.1 K/UL (ref 1.8–7.7)
NEUTROPHILS NFR BLD: 62.2 % (ref 38–73)
NRBC BLD-RTO: 0 /100 WBC
PHOSPHATE SERPL-MCNC: 3.8 MG/DL (ref 2.7–4.5)
PLATELET # BLD AUTO: 229 K/UL (ref 150–450)
PMV BLD AUTO: 11.8 FL (ref 9.2–12.9)
POTASSIUM SERPL-SCNC: 4.2 MMOL/L (ref 3.5–5.1)
POTASSIUM SERPL-SCNC: 4.4 MMOL/L (ref 3.5–5.1)
RBC # BLD AUTO: 3.88 M/UL (ref 4.6–6.2)
SODIUM SERPL-SCNC: 138 MMOL/L (ref 136–145)
SODIUM SERPL-SCNC: 141 MMOL/L (ref 136–145)
WBC # BLD AUTO: 5.03 K/UL (ref 3.9–12.7)

## 2023-10-28 PROCEDURE — 36415 COLL VENOUS BLD VENIPUNCTURE: CPT | Performed by: INTERNAL MEDICINE

## 2023-10-28 PROCEDURE — 63600175 PHARM REV CODE 636 W HCPCS: Performed by: INTERNAL MEDICINE

## 2023-10-28 PROCEDURE — 99232 SBSQ HOSP IP/OBS MODERATE 35: CPT | Mod: ,,, | Performed by: INTERNAL MEDICINE

## 2023-10-28 PROCEDURE — 80048 BASIC METABOLIC PNL TOTAL CA: CPT | Performed by: HOSPITALIST

## 2023-10-28 PROCEDURE — 85025 COMPLETE CBC W/AUTO DIFF WBC: CPT | Performed by: HOSPITALIST

## 2023-10-28 PROCEDURE — 99233 SBSQ HOSP IP/OBS HIGH 50: CPT | Mod: ,,, | Performed by: INTERNAL MEDICINE

## 2023-10-28 PROCEDURE — 99232 PR SUBSEQUENT HOSPITAL CARE,LEVL II: ICD-10-PCS | Mod: ,,, | Performed by: INTERNAL MEDICINE

## 2023-10-28 PROCEDURE — 84100 ASSAY OF PHOSPHORUS: CPT | Performed by: HOSPITALIST

## 2023-10-28 PROCEDURE — 99233 PR SUBSEQUENT HOSPITAL CARE,LEVL III: ICD-10-PCS | Mod: ,,, | Performed by: INTERNAL MEDICINE

## 2023-10-28 PROCEDURE — 83880 ASSAY OF NATRIURETIC PEPTIDE: CPT | Performed by: HOSPITALIST

## 2023-10-28 PROCEDURE — 21400001 HC TELEMETRY ROOM

## 2023-10-28 PROCEDURE — 36415 COLL VENOUS BLD VENIPUNCTURE: CPT | Performed by: HOSPITALIST

## 2023-10-28 PROCEDURE — 27000221 HC OXYGEN, UP TO 24 HOURS

## 2023-10-28 PROCEDURE — 99900035 HC TECH TIME PER 15 MIN (STAT)

## 2023-10-28 PROCEDURE — 94618 PULMONARY STRESS TESTING: CPT

## 2023-10-28 PROCEDURE — 25000003 PHARM REV CODE 250: Performed by: NURSE PRACTITIONER

## 2023-10-28 PROCEDURE — 80048 BASIC METABOLIC PNL TOTAL CA: CPT | Mod: 91 | Performed by: INTERNAL MEDICINE

## 2023-10-28 PROCEDURE — 94761 N-INVAS EAR/PLS OXIMETRY MLT: CPT

## 2023-10-28 PROCEDURE — 63600175 PHARM REV CODE 636 W HCPCS: Performed by: HOSPITALIST

## 2023-10-28 PROCEDURE — 25000003 PHARM REV CODE 250: Performed by: HOSPITALIST

## 2023-10-28 PROCEDURE — 25000003 PHARM REV CODE 250: Performed by: INTERNAL MEDICINE

## 2023-10-28 PROCEDURE — C9113 INJ PANTOPRAZOLE SODIUM, VIA: HCPCS | Performed by: HOSPITALIST

## 2023-10-28 PROCEDURE — 83735 ASSAY OF MAGNESIUM: CPT | Performed by: HOSPITALIST

## 2023-10-28 RX ORDER — BISACODYL 10 MG
10 SUPPOSITORY, RECTAL RECTAL ONCE
Status: COMPLETED | OUTPATIENT
Start: 2023-10-28 | End: 2023-10-28

## 2023-10-28 RX ORDER — POLYETHYLENE GLYCOL 3350 17 G/17G
17 POWDER, FOR SOLUTION ORAL 2 TIMES DAILY PRN
Status: DISCONTINUED | OUTPATIENT
Start: 2023-10-28 | End: 2023-10-28

## 2023-10-28 RX ORDER — POLYETHYLENE GLYCOL 3350 17 G/17G
17 POWDER, FOR SOLUTION ORAL DAILY
Status: DISCONTINUED | OUTPATIENT
Start: 2023-10-28 | End: 2023-10-29 | Stop reason: HOSPADM

## 2023-10-28 RX ADMIN — DOCUSATE SODIUM 100 MG: 100 CAPSULE, LIQUID FILLED ORAL at 08:10

## 2023-10-28 RX ADMIN — NIFEDIPINE 60 MG: 30 TABLET, FILM COATED, EXTENDED RELEASE ORAL at 08:10

## 2023-10-28 RX ADMIN — ATORVASTATIN CALCIUM 40 MG: 20 TABLET, FILM COATED ORAL at 08:10

## 2023-10-28 RX ADMIN — SODIUM CHLORIDE, POTASSIUM CHLORIDE, SODIUM LACTATE AND CALCIUM CHLORIDE 250 ML: 600; 310; 30; 20 INJECTION, SOLUTION INTRAVENOUS at 05:10

## 2023-10-28 RX ADMIN — BISACODYL 10 MG: 10 SUPPOSITORY RECTAL at 06:10

## 2023-10-28 RX ADMIN — ASPIRIN 81 MG CHEWABLE TABLET 81 MG: 81 TABLET CHEWABLE at 08:10

## 2023-10-28 RX ADMIN — SODIUM CHLORIDE 250 ML: 9 INJECTION, SOLUTION INTRAVENOUS at 02:10

## 2023-10-28 RX ADMIN — HEPARIN SODIUM 5000 UNITS: 5000 INJECTION INTRAVENOUS; SUBCUTANEOUS at 08:10

## 2023-10-28 RX ADMIN — HEPARIN SODIUM 5000 UNITS: 5000 INJECTION INTRAVENOUS; SUBCUTANEOUS at 02:10

## 2023-10-28 RX ADMIN — PANTOPRAZOLE SODIUM 40 MG: 40 INJECTION, POWDER, FOR SOLUTION INTRAVENOUS at 08:10

## 2023-10-28 RX ADMIN — POLYETHYLENE GLYCOL 3350 17 G: 17 POWDER, FOR SOLUTION ORAL at 08:10

## 2023-10-28 RX ADMIN — ACETAMINOPHEN 1000 MG: 500 TABLET ORAL at 01:10

## 2023-10-28 NOTE — ASSESSMENT & PLAN NOTE
Patient with history of Emphysema who presented with acute respiratory distress.  Patient reports sudden onset of left-sided flank pain while in bed and associated nausea and vomiting.  No dysuria or frequency.  Pox in 80s on RA.  CXR on admission with question patchy opacities in the medial lung bases, which could relate to atelectasis, aspiration or pneumonia.  CTA of C/A/P with No evidence of thoracic or abdominal aorta dissection.  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  No reports of PE, but study was not a good study to rule out pulmonary embolus per Radiology.  US of LE Veins with no evidence of lower extremity deep venous thrombosis.  TTE on admission with EF of 65-70% and Grade I DD.  Labs on admission with Trop negative x 3, , Lactate normal, Procal negative, VBG pH of 7.347 and pCO2 of 46.8, D-Dimers 2.53.  Patient started on IV Lasix 40mg and IV Heparin given concern for possible PE.  He was also given a dose of Azithromycin in the ED.  SOB improved with diuresis, but still on 2L O2NC with Pox in mid-90s.  Given ongoing hypoxia (possibly due to underlying emphysema) with elevated D-dimer, changed IV Heparin to po Apixaban 2.5mg bid on 10/25/2023 until can rule out PE - VQ scan normal on 10/26/2023, so discontinued full anticoagulation and resumed DVT prophylaxis.  6MWT on 10/27/2023 with Pox down to 88% with exertion.    Plan:  Follow up with Cardiology recommendations  Holding further Lasix given RICCI  Continue with O2NC to keep Pox >90 - re-check 6MWT for home O2  Pulmonology follow up on discharge

## 2023-10-28 NOTE — PROGRESS NOTES
"    Cardiology Progress Note    10/28/2023  12:07 PM    Subjective/Interim:      No complaints.  Patient states he feels great today.  His creatinine has improved to 1.8.     Objective:   24-hour Vitals:  Temp:  [97.5 °F (36.4 °C)-98.4 °F (36.9 °C)] 98.4 °F (36.9 °C)  Pulse:  [49-69] 49  Resp:  [12-20] 12  SpO2:  [94 %-99 %] 98 %  BP: (147-176)/(75-81) 176/78    Physical Examination:  Vitals: BP (!) 176/78 (BP Location: Right arm, Patient Position: Lying)   Pulse (!) 49   Temp 98.4 °F (36.9 °C) (Oral)   Resp 12   Ht 5' 8" (1.727 m)   Wt 66.7 kg (147 lb)   SpO2 98%   BMI 22.35 kg/m²     Physical Exam  Vitals reviewed.   Neck:      Vascular: No JVD.   Cardiovascular:      Rate and Rhythm: Normal rate and regular rhythm.      Pulses:           Carotid pulses are 2+ on the right side and 2+ on the left side.       Radial pulses are 2+ on the right side and 2+ on the left side.      Heart sounds: S1 normal and S2 normal.   Pulmonary:      Breath sounds: Normal breath sounds and air entry.   Musculoskeletal:      Right lower leg: No edema.      Left lower leg: No edema.   Neurological:      Mental Status: He is alert.           Intake/Output Summary (Last 24 hours) at 10/28/2023 1207  Last data filed at 10/28/2023 0415  Gross per 24 hour   Intake 240 ml   Output 250 ml   Net -10 ml         Laboratory:  Trended Lab Data:  Recent Labs   Lab 10/26/23  0500 10/27/23  0429 10/28/23  0436   WBC 5.61 5.04 5.03   HGB 12.6* 12.8* 12.0*   HCT 39.1* 39.5* 36.9*    254 229         Recent Labs   Lab 10/26/23  0500 10/27/23  0429 10/28/23  0436    140 141   K 4.6 4.4 4.2    107 108   CO2 24 23 25   BUN 33* 30* 31*   GLU 93 110 105   CALCIUM 8.6* 8.9 8.9   MG 2.1 2.3 2.2   PHOS 3.6 3.4 3.8         Recent Labs   Lab 10/23/23  1126   PROT 7.8   ALBUMIN 3.7   BILITOT 0.6   AST 23   ALT 14   ALKPHOS 107         Recent Labs   Lab 10/23/23  1724   INR 1.0         Cardiac:   Recent Labs   Lab 10/23/23  1154 " 10/23/23  1539 10/23/23  1722 10/24/23  0608 10/28/23  0436   TROPONINI  --  0.007 <0.006 0.018  --    *  --   --   --  33         FLP:   Lab Results   Component Value Date    CHOL 122 05/10/2021    HDL 41 05/10/2021    LDLCALC 61.6 (L) 05/10/2021    TRIG 97 05/10/2021    CHOLHDL 33.6 05/10/2021     DM:   Lab Results   Component Value Date    HGBA1C 5.7 (H) 10/24/2023    HGBA1C 6.1 (H) 08/28/2020    LDLCALC 61.6 (L) 05/10/2021    CREATININE 1.8 (H) 10/28/2023     Thyroid:   Lab Results   Component Value Date    TSH 1.354 08/28/2020     Anemia:   Lab Results   Component Value Date    IRON 101 11/20/2008    TIBC 291 11/20/2008    FERRITIN 462 (H) 11/20/2008    CAEBJKVS75 698 11/20/2008    FOLATE 16.2 11/20/2008     Urinalysis:   Lab Results   Component Value Date    LABURIN NO GROWTH AFTER 48 HOURS. 11/19/2008    COLORU Yellow 10/23/2023    SPECGRAV >1.030 (A) 10/23/2023    NITRITE Negative 10/23/2023    KETONESU Negative 10/23/2023    UROBILINOGEN Negative 10/23/2023     @      Other Results:  EKG (my interpretation):    TELEMETRY:  sinus    Echo:   Results for orders placed or performed during the hospital encounter of 10/23/23   Echo   Result Value Ref Range    BSA 1.79 m2    LA WIDTH 3.4 cm    Left Atrium Area-systolic Apical Two Chamber 17.0 cm2    Left Atrium Area-systolic Four Chamber 17.0 cm2    TAPSE 1.80 cm    RA Width 3.0 cm    LVOT stroke volume 84.77 cm3    LVIDd 3.92 3.5 - 6.0 cm    LV Systolic Volume 17.17 mL    LV Systolic Volume Index 9.6 mL/m2    LVIDs 2.25 2.1 - 4.0 cm    LV Diastolic Volume 66.79 mL    LV Diastolic Volume Index 37.31 mL/m2    IVS 1.41 (A) 0.6 - 1.1 cm    LVOT diameter 1.91 cm    LVOT area 2.9 cm2    FS 43 28 - 44 %    Left Ventricle Relative Wall Thickness 0.66 cm    Posterior Wall 1.29 (A) 0.6 - 1.1 cm    LV mass 191.86 g    LV Mass Index 107 g/m2    MV Peak E Brody 0.81 m/s    TDI LATERAL 0.03 m/s    TDI SEPTAL 0.04 m/s    E/E' ratio 23.14 m/s    MV Peak A Brody 1.15 m/s    TR  Max Brody 2.91 m/s    E/A ratio 0.70     IVRT 188.39 msec    E wave deceleration time 352.82 msec    LV SEPTAL E/E' RATIO 20.25 m/s    LA Volume Index 21.7 mL/m2    LV LATERAL E/E' RATIO 27.00 m/s    LA volume 38.83 cm3    LVOT peak brody 1.43 m/s    Left Ventricular Outflow Tract Mean Velocity 0.92 cm/s    Left Ventricular Outflow Tract Mean Gradient 4.00 mmHg    LA size 2.61 cm    Left Atrium Minor Axis 5.35 cm    Left Atrium Major Axis 4.96 cm    RVDD 2.20 cm    RV S' 18.80 cm/s    RA Major Axis 3.84 cm    AV mean gradient 6 mmHg    AV peak gradient 11 mmHg    Ao peak brody 1.67 m/s    Ao VTI 34.00 cm    LVOT peak VTI 29.60 cm    AV valve area 2.49 cm²    AV Velocity Ratio 0.86     AV index (prosthetic) 0.87     BENNIE by Velocity Ratio 2.45 cm²    Mr max brody 0.03 m/s    MV stenosis pressure 1/2 time 102.32 ms    MV valve area p 1/2 method 2.15 cm2    Triscuspid Valve Regurgitation Peak Gradient 34 mmHg    PV PEAK VELOCITY 0.95 m/s    PV peak gradient 4 mmHg    Pulmonary Valve Mean Velocity 0.65 m/s    Ao root annulus 2.94 cm    STJ 3.02 cm    Ascending aorta 3.13 cm    IVC diameter 1.85 cm    Mean e' 0.04 m/s    ZLVIDS -2.43     ZLVIDD -2.33     Narrative      Left Ventricle: The left ventricle is normal in size. Moderately   increased wall thickness. Normal wall motion. There is normal systolic   function with a visually estimated ejection fraction of 65 - 70%. Grade I   diastolic dysfunction.    Right Ventricle: Normal right ventricular cavity size. Systolic   function is normal.    Aortic Valve: There is mild aortic valve sclerosis.    Mitral Valve: There is no stenosis. There is no significant   regurgitation.    Tricuspid Valve: There is normal leaflet mobility. There is no   significant regurgitation.         Radiology:  US Retroperitoneal Complete    Result Date: 10/25/2023  EXAMINATION: US RETROPERITONEAL COMPLETE CLINICAL HISTORY: joaquim; TECHNIQUE: Ultrasound of the kidneys and urinary bladder was performed  including color flow and Doppler evaluation of the kidneys. COMPARISON: None. FINDINGS: Right kidney: The right kidney measures 10.7 cm. No cortical thinning. No loss of corticomedullary distinction. Resistive index measures 0.73.  Approximately 3 cysts, largest measuring 4.1 cm.  The 2 smaller cysts both have thin internal septations.  No renal stone. No hydronephrosis. Left kidney: The left kidney measures 13.8 cm, with measurement including 1 of the larger cysts.  No cortical thinning. No loss of corticomedullary distinction. Resistive index measures 0.71.  Two large cysts.  Largest in the lower pole measures 7 cm and contains a mildly thickened septation.  No renal stone. No hydronephrosis. The bladder is partially distended at the time of scanning and has an unremarkable appearance.     No acute process seen. Bilateral simple and complex renal cysts. Electronically signed by: Mercedes Drake Date:    10/25/2023 Time:    09:07    Echo    Result Date: 10/24/2023    Left Ventricle: The left ventricle is normal in size. Moderately increased wall thickness. Normal wall motion. There is normal systolic function with a visually estimated ejection fraction of 65 - 70%. Grade I diastolic dysfunction.   Right Ventricle: Normal right ventricular cavity size. Systolic function is normal.   Aortic Valve: There is mild aortic valve sclerosis.   Mitral Valve: There is no stenosis. There is no significant regurgitation.   Tricuspid Valve: There is normal leaflet mobility. There is no significant regurgitation.     US Lower Extremity Veins Bilateral    Result Date: 10/23/2023  EXAMINATION: US LOWER EXTREMITY VEINS BILATERAL CLINICAL HISTORY: Rule out DVT; TECHNIQUE: Duplex and color flow Doppler evaluation of the bilateral lower extremity veins was performed. COMPARISON: None FINDINGS: No evidence of clot involving the bilateral common femoral, greater saphenous, femoral, popliteal, peroneal, anterior and posterior tibial veins.   All venous structures demonstrate normal respiratory phasicity and augment adequately.  No evidence of soft tissue mass or Baker's cyst.     No evidence of lower extremity deep venous thrombosis. Electronically signed by: Dago Simms MD Date:    10/23/2023 Time:    17:53    CTA Chest Abdomen Pelvis    Result Date: 10/23/2023  EXAMINATION: CTA CHEST ABDOMEN PELVIS CLINICAL HISTORY: Aortic dissection suspected;flank pain with ekg changes; TECHNIQUE: 1.25 mm axial images of the chest, thoracic and abdominal aorta were obtained after the administration of 100 cc of Omni 350 IV contrast, coronal and sagittal images reformatted. COMPARISON: None FINDINGS: Pre contrast images demonstrate no hyperattenuating mural thrombus to suggest an acute dissection. The thoracic aorta is of normal caliber and demonstrates mild atherosclerotic plaque.  The ascending aorta measures approximately 3.8 cm, the descending thoracic aorta measures 3.5 cm. The abdominal aorta is aneurysmal measuring 3.1 cm in its infrarenal region. The celiac trunk, SMA, bilateral renal arteries and KAITLYN are patent.  The bilateral iliac and femoral vessels are patent. The airways are patent.  The thyroid gland appears normal.  No mediastinal or hilar lymphadenopathy.  The cardiac silhouette is slightly prominent.  Coronary artery calcifications seen.  No pericardial effusion.  The esophagus course normally. Emphysema noted.  There is mild atelectasis at the bilateral lung basis.  No pleural effusion.  The axillary regions appear normal. No liver lesions.  The gallbladder is present, no radiopaque stones seen within.  Small hiatal hernia.  The remainder of the stomach appears normal.  The pancreas, spleen and bilateral adrenal glands appear normal.  Bilateral low attenuating kidney lesions, the larger ones are likely cysts, some lesions are too small to be characterized.  No hydronephrosis.  The bilateral ureters appear normal.  No para-aortic  lymphadenopathy.  Mild stool retention. No inflammatory changes of the bowel seen, no bowel dilation. The appendix is normal.  The bladder appears normal. The prostate is mildly enlarged. The inguinal regions appear normal. The osseous structures demonstrate no osseous lesions.     No evidence of thoracic or abdominal aorta dissection. 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 Bilateral kidney lesion, the largest 1 appear to represent, the smaller ones are too small to be characterized. Small hiatal hernia. Electronically signed by: Kimberly Harper MD Date:    10/23/2023 Time:    13:46    X-Ray Chest AP Portable    Result Date: 10/23/2023  EXAMINATION: XR CHEST AP PORTABLE CLINICAL HISTORY: Tachypnea, not elsewhere classified TECHNIQUE: Single frontal view of the chest was performed. COMPARISON: Chest radiograph 09/30/2022. FINDINGS: Monitoring leads overlie the chest. Grossly unchanged cardiomediastinal contours, noting enlargement of the cardiac silhouette. Question patchy opacities in medial lung bases. No definite pneumothorax or large volume pleural effusion.  Nonspecific increased attenuation in the right paramedian upper mediastinum stable dating back to at least 11/17/2008 radiograph. No definite acute findings in the visualized abdomen. Osseous and soft tissue structures without definite acute change.     Question patchy opacities in the medial lung bases, which could relate to atelectasis, aspiration or pneumonia. Electronically signed by: Eddie Corbin Date:    10/23/2023 Time:    12:38        Current Medications:     Infusions:         Scheduled:   aspirin  81 mg Oral Daily    atorvastatin  40 mg Oral Daily    bisacodyL  10 mg Rectal Once    docusate sodium  100 mg Oral BID    heparin (porcine)  5,000 Units Subcutaneous Q8H    NIFEdipine  60 mg Oral BID    pantoprazole  40 mg Intravenous Daily    polyethylene glycol  17 g Oral Daily    sodium chloride 0.9%  250 mL Intravenous Once         PRN:  acetaminophen, morphine, sodium chloride 0.9%     Assessment and Plan:     Dwayne Saravia is a 88 y.o.male with  Acute hypoxic respiratory failure  Mild acute diastoic CHF responding to diuretics.  Negative 1L.  No ACS.  T waves inversion likely from LVH.  Echo resulted.  -Euvolemic today.  agree with holding lasix.     HTN   Continue meds and monitor     H/o CVA     CKD 3   As per hospital medicine team         Don Mitchell MD        Disclaimer: This document was created using voice recognition software (M*Modal Fluency Direct). Although it may be edited, this document may contain errors related to incorrect recognition of the spoken word. Please call the physician if clarification is needed.

## 2023-10-28 NOTE — ASSESSMENT & PLAN NOTE
Creatinine on admission was 1.5 from a baseline of around 1.5 (2019).  Creatinine bumped to 1.9 and 2.0 after starting IV Lasix and after CTA of C/A/P with contrast.  Uric Acid elevated at 7.8 and FEUrea 28.5 suggests a pre-renal component.  Urine Eos negative.  US of Kidneys on 10/25/2023 without evidence of obrstuction. Creatinine down, but plateau at 1.8 today.  Will give a bolus of LR 250ml x1 and repeat the BMP at noon - if better, then will d/c today.  -IV LR 250ml x1 and repeat the BMP at noon - if better, then will d/c today.  -Hold further Lasix for now  -Hold ARB for now  -Renal dose medications and avoid nephrotoxins  -Monitor for improvement

## 2023-10-28 NOTE — SUBJECTIVE & OBJECTIVE
Interval History: Patient is awake and alert this morning.  No adverse events reported overnight.  No chest pain or SOB.  Pox stable on O2 2L NC.  No cough.  No Fevers.  Flank pain is better, but not resolved.  Creatinine plateau at 1.8 today - will give a bolus of IV fluids (250ml LR) and repeat the creatinine this afternoon - if better, then will discharge home today.     Review of Systems   Constitutional:  Negative for chills, diaphoresis, fatigue and fever.   HENT:  Negative for congestion, ear pain, hearing loss and tinnitus.    Eyes:  Negative for photophobia, pain, discharge and visual disturbance.   Respiratory:  Positive for shortness of breath (improving). Negative for cough, chest tightness and wheezing.    Cardiovascular:  Negative for chest pain, palpitations and leg swelling.   Gastrointestinal:  Negative for abdominal distention, blood in stool, constipation, diarrhea, nausea and vomiting.   Endocrine: Negative for cold intolerance, heat intolerance, polydipsia, polyphagia and polyuria.   Genitourinary:  Positive for flank pain (improving). Negative for dysuria, frequency, hematuria and urgency.   Musculoskeletal:  Negative for arthralgias, back pain, gait problem, myalgias and neck pain.   Skin:  Negative for rash and wound.   Allergic/Immunologic: Negative for environmental allergies, food allergies and immunocompromised state.   Neurological:  Negative for seizures, syncope, facial asymmetry, weakness and headaches.   Psychiatric/Behavioral:  Negative for agitation, behavioral problems, confusion and hallucinations.      Objective:     Vital Signs (Most Recent):  Temp: 97.8 °F (36.6 °C) (10/28/23 0825)  Pulse: (!) 57 (10/28/23 0956)  Resp: 18 (10/28/23 0825)  BP: (!) 167/81 (10/28/23 0825)  SpO2: (!) 94 % (10/28/23 0825) Vital Signs (24h Range):  Temp:  [97.2 °F (36.2 °C)-98 °F (36.7 °C)] 97.8 °F (36.6 °C)  Pulse:  [49-69] 57  Resp:  [16-20] 18  SpO2:  [94 %-99 %] 94 %  BP: (133-167)/(70-81)  167/81     Weight: 66.7 kg (147 lb)  Body mass index is 22.35 kg/m².    Intake/Output Summary (Last 24 hours) at 10/28/2023 1031  Last data filed at 10/28/2023 0415  Gross per 24 hour   Intake 240 ml   Output 250 ml   Net -10 ml           Physical Exam  Constitutional:       General: He is not in acute distress.     Appearance: He is normal weight. He is not ill-appearing, toxic-appearing or diaphoretic.   HENT:      Head: Normocephalic and atraumatic.      Right Ear: External ear normal.      Left Ear: External ear normal.      Nose: Nose normal.      Mouth/Throat:      Mouth: Mucous membranes are moist.      Pharynx: Oropharynx is clear. No oropharyngeal exudate.   Eyes:      General: No scleral icterus.        Right eye: No discharge.         Left eye: No discharge.      Conjunctiva/sclera: Conjunctivae normal.      Pupils: Pupils are equal, round, and reactive to light.   Neck:      Thyroid: No thyromegaly.      Vascular: No JVD.      Trachea: No tracheal deviation.   Cardiovascular:      Rate and Rhythm: Normal rate and regular rhythm.      Heart sounds: Normal heart sounds. No murmur heard.     No friction rub. No gallop.   Pulmonary:      Effort: Pulmonary effort is normal. No respiratory distress.      Breath sounds: Normal breath sounds. No stridor. No wheezing or rales.   Chest:      Chest wall: No tenderness.   Abdominal:      General: Bowel sounds are normal. There is no distension.      Palpations: Abdomen is soft. There is no mass.      Tenderness: There is no abdominal tenderness. There is no guarding or rebound.   Musculoskeletal:         General: No tenderness. Normal range of motion.      Cervical back: Normal range of motion and neck supple. No rigidity.      Right lower leg: No edema.      Left lower leg: No edema.   Lymphadenopathy:      Cervical: No cervical adenopathy.   Skin:     General: Skin is warm and dry.      Coloration: Skin is not pale.      Findings: No erythema or rash.    Neurological:      Mental Status: He is alert and oriented to person, place, and time.      Cranial Nerves: No cranial nerve deficit.      Motor: No abnormal muscle tone.      Coordination: Coordination normal.      Deep Tendon Reflexes: Reflexes are normal and symmetric. Reflexes normal.      Comments: Patient with residual left-sided weakness, mild dysarthria.  At baseline per family at bedside.   Psychiatric:         Mood and Affect: Mood normal.         Behavior: Behavior normal.         Thought Content: Thought content normal.             Significant Labs: All pertinent labs within the past 24 hours have been reviewed.    Significant Imaging: I have reviewed all pertinent imaging results/findings within the past 24 hours.

## 2023-10-28 NOTE — PROGRESS NOTES
[facility] Surg [address] Medicine  Progress Note    Patient Name: Dwayne Saravia  MRN: [id number]  Patient Class: IP- Inpatient   Admission Date: 10/23/2023  Length of Stay: 5 days  Attending Physician: Sanchez Veras MD  Primary Care Provider: William Mabry MD        Subjective:     Principal Problem:Acute hypoxic respiratory failure      HPI:  Patient is 88-year-old man with history of hypertension, chronic kidney disease stage III, dyslipidemia, chronic obstructive pulmonary disease, prior tobacco smoker (2 packs per week for 15 years), prior stroke with residual right-sided weakness who walks with walker at baseline who presents to the emergency department with respiratory distress with tachypnea new onset hypoxia.  Patient reports feeling well yesterday.  This morning he felt sudden onset of left-sided flank pain while in bed. He reports associated nausea and vomiting.  No dysuria or frequency.  No constipation or diarrhea.  Last bowel movement yesterday which was normal.  No fevers, chills.  No cough or sputum.  No recent fall.  No recent travel or sick contact.      He lives at home with his wife.  No alcohol or illicit drugs.  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  He reports family history of hypertension.        Overview/Hospital Course:  See below      Interval History: Patient is awake and alert this morning.  No adverse events reported overnight.  No chest pain or SOB.  Pox stable on O2 2L NC.  No cough.  No Fevers.  Flank pain is better, but not resolved.  Creatinine plateau at 1.8 today - will give a bolus of IV fluids (250ml LR) and repeat the creatinine this afternoon - if better, then will discharge home today.     Review of Systems   Constitutional:  Negative for chills, diaphoresis, fatigue and fever.   HENT:  Negative for congestion, ear pain, hearing loss and tinnitus.    Eyes:  Negative for photophobia, pain, discharge  and visual disturbance.   Respiratory:  Positive for shortness of breath (improving). Negative for cough, chest tightness and wheezing.    Cardiovascular:  Negative for chest pain, palpitations and leg swelling.   Gastrointestinal:  Negative for abdominal distention, blood in stool, constipation, diarrhea, nausea and vomiting.   Endocrine: Negative for cold intolerance, heat intolerance, polydipsia, polyphagia and polyuria.   Genitourinary:  Positive for flank pain (improving). Negative for dysuria, frequency, hematuria and urgency.   Musculoskeletal:  Negative for arthralgias, back pain, gait problem, myalgias and neck pain.   Skin:  Negative for rash and wound.   Allergic/Immunologic: Negative for environmental allergies, food allergies and immunocompromised state.   Neurological:  Negative for seizures, syncope, facial asymmetry, weakness and headaches.   Psychiatric/Behavioral:  Negative for agitation, behavioral problems, confusion and hallucinations.      Objective:     Vital Signs (Most Recent):  Temp: 97.8 °F (36.6 °C) (10/28/23 0825)  Pulse: (!) 57 (10/28/23 0956)  Resp: 18 (10/28/23 0825)  BP: (!) 167/81 (10/28/23 0825)  SpO2: (!) 94 % (10/28/23 0825) Vital Signs (24h Range):  Temp:  [97.2 °F (36.2 °C)-98 °F (36.7 °C)] 97.8 °F (36.6 °C)  Pulse:  [49-69] 57  Resp:  [16-20] 18  SpO2:  [94 %-99 %] 94 %  BP: (133-167)/(70-81) 167/81     Weight: 66.7 kg (147 lb)  Body mass index is 22.35 kg/m².    Intake/Output Summary (Last 24 hours) at 10/28/2023 1031  Last data filed at 10/28/2023 0415  Gross per 24 hour   Intake 240 ml   Output 250 ml   Net -10 ml           Physical Exam  Constitutional:       General: He is not in acute distress.     Appearance: He is normal weight. He is not ill-appearing, toxic-appearing or diaphoretic.   HENT:      Head: Normocephalic and atraumatic.      Right Ear: External ear normal.      Left Ear: External ear normal.      Nose: Nose normal.      Mouth/Throat:      Mouth: Mucous  membranes are moist.      Pharynx: Oropharynx is clear. No oropharyngeal exudate.   Eyes:      General: No scleral icterus.        Right eye: No discharge.         Left eye: No discharge.      Conjunctiva/sclera: Conjunctivae normal.      Pupils: Pupils are equal, round, and reactive to light.   Neck:      Thyroid: No thyromegaly.      Vascular: No JVD.      Trachea: No tracheal deviation.   Cardiovascular:      Rate and Rhythm: Normal rate and regular rhythm.      Heart sounds: Normal heart sounds. No murmur heard.     No friction rub. No gallop.   Pulmonary:      Effort: Pulmonary effort is normal. No respiratory distress.      Breath sounds: Normal breath sounds. No stridor. No wheezing or rales.   Chest:      Chest wall: No tenderness.   Abdominal:      General: Bowel sounds are normal. There is no distension.      Palpations: Abdomen is soft. There is no mass.      Tenderness: There is no abdominal tenderness. There is no guarding or rebound.   Musculoskeletal:         General: No tenderness. Normal range of motion.      Cervical back: Normal range of motion and neck supple. No rigidity.      Right lower leg: No edema.      Left lower leg: No edema.   Lymphadenopathy:      Cervical: No cervical adenopathy.   Skin:     General: Skin is warm and dry.      Coloration: Skin is not pale.      Findings: No erythema or rash.   Neurological:      Mental Status: He is alert and oriented to person, place, and time.      Cranial Nerves: No cranial nerve deficit.      Motor: No abnormal muscle tone.      Coordination: Coordination normal.      Deep Tendon Reflexes: Reflexes are normal and symmetric. Reflexes normal.      Comments: Patient with residual left-sided weakness, mild dysarthria.  At baseline per family at bedside.   Psychiatric:         Mood and Affect: Mood normal.         Behavior: Behavior normal.         Thought Content: Thought content normal.             Significant Labs: All pertinent labs within the past  24 hours have been reviewed.    Significant Imaging: I have reviewed all pertinent imaging results/findings within the past 24 hours.      Assessment/Plan:      * Acute hypoxic respiratory failure  Patient with history of Emphysema who presented with acute respiratory distress.  Patient reports sudden onset of left-sided flank pain while in bed and associated nausea and vomiting.  No dysuria or frequency.  Pox in 80s on RA.  CXR on admission with question patchy opacities in the medial lung bases, which could relate to atelectasis, aspiration or pneumonia.  CTA of C/A/P with No evidence of thoracic or abdominal aorta dissection.  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  No reports of PE, but study was not a good study to rule out pulmonary embolus per Radiology.  US of LE Veins with no evidence of lower extremity deep venous thrombosis.  TTE on admission with EF of 65-70% and Grade I DD.  Labs on admission with Trop negative x 3, , Lactate normal, Procal negative, VBG pH of 7.347 and pCO2 of 46.8, D-Dimers 2.53.  Patient started on IV Lasix 40mg and IV Heparin given concern for possible PE.  He was also given a dose of Azithromycin in the ED.  SOB improved with diuresis, but still on 2L O2NC with Pox in mid-90s.  Given ongoing hypoxia (possibly due to underlying emphysema) with elevated D-dimer, changed IV Heparin to po Apixaban 2.5mg bid on 10/25/2023 until can rule out PE - VQ scan normal on 10/26/2023, so discontinued full anticoagulation and resumed DVT prophylaxis.  6MWT on 10/27/2023 with Pox down to 88% with exertion.    Plan:  Follow up with Cardiology recommendations  Holding further Lasix given RICCI  Continue with O2NC to keep Pox >90 - re-check 6MWT for home O2  Pulmonology follow up on discharge    Acute kidney injury superimposed on CKD  Creatinine on admission was 1.5 from a baseline of around 1.5 (2019).  Creatinine bumped to 1.9 and 2.0 after starting IV Lasix and after CTA of C/A/P with  contrast.  Uric Acid elevated at 7.8 and FEUrea 28.5 suggests a pre-renal component.  Urine Eos negative.  US of Kidneys on 10/25/2023 without evidence of obrstuction. Creatinine down, but plateau at 1.8 today.  Will give a bolus of LR 250ml x1 and repeat the BMP at noon - if better, then will d/c today.  -IV LR 250ml x1 and repeat the BMP at noon - if better, then will d/c today.  -Hold further Lasix for now  -Hold ARB for now  -Renal dose medications and avoid nephrotoxins  -Monitor for improvement      Hemiparesis affecting right side as late effect of stroke  Per patient and wife, patient had a CVA in the early 2000s and then had subsequent stroke a few years later.  Since that time he has been having residual left-sided hemiparesis.    -Continue ASA/Statin    Renal hypertension  SBP improved, but still a little elevated.  Home Meds:  Nifedipine XL 60mg, Carvedilol 6.25mg bid, Losartan 100mg, HCTZ 25mg  Hospital Meds:  Nifedipine XL 60mg bid, Carvedilol 6.25mg bid  -Hold ARB given RICCI  -Continue current regimen  -Monitor and adjust as needed      Gastroesophageal reflux disease without esophagitis  -Continue with PPI        VTE Risk Mitigation (From admission, onward)         Ordered     heparin (porcine) injection 5,000 Units  Every 8 hours         10/27/23 1022     IP VTE HIGH RISK PATIENT  Once         10/23/23 1644                Discharge Planning   HAILY:      Code Status: Full Code   Is the patient medically ready for discharge?:     Reason for patient still in hospital (select all that apply): Patient trending condition, Treatment and Consult recommendations  Discharge Plan A: Home with family                  Sanchez Veras MD  Department of Hospital Medicine   [facility] - Med Surg ([address])

## 2023-10-28 NOTE — PROCEDURES
"Instructions for measuring Oxygen Saturation  to qualify for Home Oxygen:    Please obtain and document (REPLACE # SIGNS AND COPY AND PASTE TEXT INSIDE QUOTATION MARKS) the following for Home Oxygen:    This must be performed and documented within 2 days of discharge.    "Pulse Oximetry:    #% SpO2 on room air at rest on #date."                                 If 88% or less, STOP and document.     If 89% or more, measure and  document the following:    "Pulse Oximetry:   90%SpO2 on room air at rest on 10/28/2023                           85%SpO2  on room air with activity/exercise on 10/28/2023                      95% SpO2 on 2L oxgyen with activity/excercise on 10/28/2023"    (NOTE:  FOR OXYGEN WITH ACTIVITY - MEDICARE WANTS TO SEE THAT THE OXYGEN INCREASES ONCE A PATIENT HAS WALKED AND IS BACK ON THE OXYGEN)        "

## 2023-10-29 VITALS
HEIGHT: 68 IN | BODY MASS INDEX: 22.28 KG/M2 | WEIGHT: 147 LBS | HEART RATE: 63 BPM | RESPIRATION RATE: 20 BRPM | OXYGEN SATURATION: 93 % | SYSTOLIC BLOOD PRESSURE: 133 MMHG | TEMPERATURE: 99 F | DIASTOLIC BLOOD PRESSURE: 82 MMHG

## 2023-10-29 LAB
ANION GAP SERPL CALC-SCNC: 6 MMOL/L (ref 8–16)
BASOPHILS # BLD AUTO: 0.01 K/UL (ref 0–0.2)
BASOPHILS NFR BLD: 0.2 % (ref 0–1.9)
BUN SERPL-MCNC: 25 MG/DL (ref 8–23)
CALCIUM SERPL-MCNC: 8.8 MG/DL (ref 8.7–10.5)
CHLORIDE SERPL-SCNC: 106 MMOL/L (ref 95–110)
CO2 SERPL-SCNC: 25 MMOL/L (ref 23–29)
CREAT SERPL-MCNC: 1.4 MG/DL (ref 0.5–1.4)
DIFFERENTIAL METHOD: ABNORMAL
EOSINOPHIL # BLD AUTO: 0.1 K/UL (ref 0–0.5)
EOSINOPHIL NFR BLD: 2.6 % (ref 0–8)
ERYTHROCYTE [DISTWIDTH] IN BLOOD BY AUTOMATED COUNT: 14.3 % (ref 11.5–14.5)
EST. GFR  (NO RACE VARIABLE): 48 ML/MIN/1.73 M^2
GLUCOSE SERPL-MCNC: 87 MG/DL (ref 70–110)
HCT VFR BLD AUTO: 37.5 % (ref 40–54)
HGB BLD-MCNC: 12.4 G/DL (ref 14–18)
IMM GRANULOCYTES # BLD AUTO: 0.02 K/UL (ref 0–0.04)
IMM GRANULOCYTES NFR BLD AUTO: 0.4 % (ref 0–0.5)
LYMPHOCYTES # BLD AUTO: 1.1 K/UL (ref 1–4.8)
LYMPHOCYTES NFR BLD: 20.9 % (ref 18–48)
MAGNESIUM SERPL-MCNC: 2.2 MG/DL (ref 1.6–2.6)
MCH RBC QN AUTO: 31 PG (ref 27–31)
MCHC RBC AUTO-ENTMCNC: 33.1 G/DL (ref 32–36)
MCV RBC AUTO: 94 FL (ref 82–98)
MONOCYTES # BLD AUTO: 0.9 K/UL (ref 0.3–1)
MONOCYTES NFR BLD: 17.2 % (ref 4–15)
NEUTROPHILS # BLD AUTO: 3 K/UL (ref 1.8–7.7)
NEUTROPHILS NFR BLD: 58.7 % (ref 38–73)
NRBC BLD-RTO: 0 /100 WBC
PHOSPHATE SERPL-MCNC: 2.6 MG/DL (ref 2.7–4.5)
PLATELET # BLD AUTO: 233 K/UL (ref 150–450)
PMV BLD AUTO: 12.1 FL (ref 9.2–12.9)
POTASSIUM SERPL-SCNC: 4.5 MMOL/L (ref 3.5–5.1)
RBC # BLD AUTO: 4 M/UL (ref 4.6–6.2)
SODIUM SERPL-SCNC: 137 MMOL/L (ref 136–145)
WBC # BLD AUTO: 5.07 K/UL (ref 3.9–12.7)

## 2023-10-29 PROCEDURE — C9113 INJ PANTOPRAZOLE SODIUM, VIA: HCPCS | Performed by: HOSPITALIST

## 2023-10-29 PROCEDURE — 25000003 PHARM REV CODE 250: Performed by: HOSPITALIST

## 2023-10-29 PROCEDURE — 99239 HOSP IP/OBS DSCHRG MGMT >30: CPT | Mod: ,,, | Performed by: INTERNAL MEDICINE

## 2023-10-29 PROCEDURE — 99239 PR HOSPITAL DISCHARGE DAY,>30 MIN: ICD-10-PCS | Mod: ,,, | Performed by: INTERNAL MEDICINE

## 2023-10-29 PROCEDURE — 1111F PR DISCHARGE MEDS RECONCILED W/ CURRENT OUTPATIENT MED LIST: ICD-10-PCS | Mod: CPTII,,, | Performed by: INTERNAL MEDICINE

## 2023-10-29 PROCEDURE — 94761 N-INVAS EAR/PLS OXIMETRY MLT: CPT

## 2023-10-29 PROCEDURE — 1111F DSCHRG MED/CURRENT MED MERGE: CPT | Mod: CPTII,,, | Performed by: INTERNAL MEDICINE

## 2023-10-29 PROCEDURE — 84100 ASSAY OF PHOSPHORUS: CPT | Performed by: HOSPITALIST

## 2023-10-29 PROCEDURE — 99900035 HC TECH TIME PER 15 MIN (STAT)

## 2023-10-29 PROCEDURE — 36415 COLL VENOUS BLD VENIPUNCTURE: CPT | Performed by: HOSPITALIST

## 2023-10-29 PROCEDURE — 85025 COMPLETE CBC W/AUTO DIFF WBC: CPT | Performed by: HOSPITALIST

## 2023-10-29 PROCEDURE — 63600175 PHARM REV CODE 636 W HCPCS: Performed by: HOSPITALIST

## 2023-10-29 PROCEDURE — 83735 ASSAY OF MAGNESIUM: CPT | Performed by: HOSPITALIST

## 2023-10-29 PROCEDURE — 80048 BASIC METABOLIC PNL TOTAL CA: CPT | Performed by: HOSPITALIST

## 2023-10-29 PROCEDURE — 25000003 PHARM REV CODE 250: Performed by: INTERNAL MEDICINE

## 2023-10-29 RX ORDER — LOSARTAN POTASSIUM 50 MG/1
50 TABLET ORAL DAILY
Status: DISCONTINUED | OUTPATIENT
Start: 2023-10-29 | End: 2023-10-29 | Stop reason: HOSPADM

## 2023-10-29 RX ORDER — NIFEDIPINE 60 MG/1
60 TABLET, EXTENDED RELEASE ORAL 2 TIMES DAILY
Qty: 60 TABLET | Refills: 1 | Status: SHIPPED | OUTPATIENT
Start: 2023-10-29

## 2023-10-29 RX ORDER — DOCUSATE SODIUM 100 MG/1
100 CAPSULE, LIQUID FILLED ORAL DAILY
Qty: 30 CAPSULE | Refills: 0 | Status: SHIPPED | OUTPATIENT
Start: 2023-10-29

## 2023-10-29 RX ORDER — LOSARTAN POTASSIUM 50 MG/1
50 TABLET ORAL DAILY
Qty: 30 TABLET | Refills: 1 | Status: SHIPPED | OUTPATIENT
Start: 2023-10-29 | End: 2023-11-17

## 2023-10-29 RX ORDER — HYDROCHLOROTHIAZIDE 12.5 MG/1
12.5 TABLET ORAL DAILY
Qty: 30 TABLET | Refills: 0 | Status: SHIPPED | OUTPATIENT
Start: 2023-10-29 | End: 2023-11-28 | Stop reason: SDUPTHER

## 2023-10-29 RX ORDER — FLUTICASONE FUROATE AND VILANTEROL 100; 25 UG/1; UG/1
1 POWDER RESPIRATORY (INHALATION) DAILY
Qty: 60 EACH | Refills: 0 | Status: SHIPPED | OUTPATIENT
Start: 2023-10-29

## 2023-10-29 RX ADMIN — DOCUSATE SODIUM 100 MG: 100 CAPSULE, LIQUID FILLED ORAL at 09:10

## 2023-10-29 RX ADMIN — NIFEDIPINE 60 MG: 30 TABLET, FILM COATED, EXTENDED RELEASE ORAL at 09:10

## 2023-10-29 RX ADMIN — ATORVASTATIN CALCIUM 40 MG: 20 TABLET, FILM COATED ORAL at 09:10

## 2023-10-29 RX ADMIN — LOSARTAN POTASSIUM 50 MG: 50 TABLET, FILM COATED ORAL at 12:10

## 2023-10-29 RX ADMIN — POLYETHYLENE GLYCOL 3350 17 G: 17 POWDER, FOR SOLUTION ORAL at 09:10

## 2023-10-29 RX ADMIN — ASPIRIN 81 MG CHEWABLE TABLET 81 MG: 81 TABLET CHEWABLE at 09:10

## 2023-10-29 RX ADMIN — ACETAMINOPHEN 1000 MG: 500 TABLET ORAL at 04:10

## 2023-10-29 RX ADMIN — PANTOPRAZOLE SODIUM 40 MG: 40 INJECTION, POWDER, FOR SOLUTION INTRAVENOUS at 09:10

## 2023-10-29 NOTE — DISCHARGE SUMMARY
[facility] Surg [address] Medicine  Discharge Summary      Patient Name: Dwayne Saravia  MRN: [id number]  [facility]: [id number]  Patient Class: IP- Inpatient  Admission Date: 10/23/2023  Hospital Length of Stay: 6 days  Discharge Date and Time:  10/29/2023 12:15 PM  Attending Physician: Sanchez Veras MD   Discharging Provider: Sanchez Veras MD  Primary Care Provider: William Mabry MD    Primary Care Team: Networked reference to record PCT     HPI:   Patient is 88-year-old man with history of hypertension, chronic kidney disease stage III, dyslipidemia, chronic obstructive pulmonary disease, prior tobacco smoker (2 packs per week for 15 years), prior stroke with residual right-sided weakness who walks with walker at baseline who presents to the emergency department with respiratory distress with tachypnea new onset hypoxia.  Patient reports feeling well yesterday.  This morning he felt sudden onset of left-sided flank pain while in bed. He reports associated nausea and vomiting.  No dysuria or frequency.  No constipation or diarrhea.  Last bowel movement yesterday which was normal.  No fevers, chills.  No cough or sputum.  No recent fall.  No recent travel or sick contact.      He lives at home with his wife.  No alcohol or illicit drugs.  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  He reports family history of hypertension.        * No surgery found *      Hospital Course:   See below       Goals of Care Treatment Preferences:  Code Status: Full Code          What is most important right now is to focus on extending life as long as possible, even it it means sacrificing quality.  Accordingly, we have decided that the best plan to meet the patient's goals includes continuing with treatment.      Consults:   Consults (From admission, onward)        Status Ordering Provider     Inpatient consult to Cardiology  Once        Provider:  Don Mitchell MD     Completed LAKESHA MENDEZ     Inpatient consult to Social Work/Case Management  Once        Provider:  (Not yet assigned)    Completed LAKESHA MENDEZ     Inpatient consult to Registered Dietitian/Nutritionist  Once        Provider:  (Not yet assigned)    Completed LAKESHA MENDEZ          Neuro  Hemiparesis affecting right side as late effect of stroke  Per patient and wife, patient had a CVA in the early 2000s and then had subsequent stroke a few years later.  Since that time he has been having residual left-sided hemiparesis.    -Continue ASA/Statin    Cardiac/Vascular  Renal hypertension  SBP improved, but still a little elevated.  Carvedilol d/c given bradycardia.    Home Meds:  Nifedipine XL 60mg, Carvedilol 6.25mg bid, Losartan 100mg, HCTZ 25mg  Hospital Meds:  Nifedipine XL 60mg bid  -Continue Nifedipine XL 60mg bid  -Restart Losartan at 50mg once a day  -Restart HCTZ 12.5mg once a day  -Monitor at home and follow up with PCP      Renal/  Acute kidney injury superimposed on CKD  Creatinine on admission was 1.5 from a baseline of around 1.5 (2019).  Creatinine bumped to 1.9 and 2.0 after starting IV Lasix and after CTA of C/A/P with contrast.  Uric Acid elevated at 7.8 and FEUrea 28.5 suggests a pre-renal component.  Urine Eos negative.  US of Kidneys on 10/25/2023 without evidence of obrstuction. Creatinine down, but plateau at 1.8 yesterday.  Given LR 250ml bolus x 2 with improvement of creatinine to 1.4.  -Increase oral fluids over the next 2 days  -Restart Losartan at 50mg as below  -PCP follow up in 1 week      GI  Gastroesophageal reflux disease without esophagitis  -Continue with PPI      Other  * Acute hypoxic respiratory failure  Patient with history of Emphysema who presented with acute respiratory distress.  Patient reports sudden onset of left-sided flank pain while in bed and associated nausea and vomiting.  No dysuria or frequency.  Pox in 80s on RA.  CXR on admission with question  patchy opacities in the medial lung bases, which could relate to atelectasis, aspiration or pneumonia.  CTA of C/A/P with No evidence of thoracic or abdominal aorta dissection.  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  No reports of PE, but study was not a good study to rule out pulmonary embolus per Radiology.  US of LE Veins with no evidence of lower extremity deep venous thrombosis.  TTE on admission with EF of 65-70% and Grade I DD.  Labs on admission with Trop negative x 3, , Lactate normal, Procal negative, VBG pH of 7.347 and pCO2 of 46.8, D-Dimers 2.53.  Patient started on IV Lasix 40mg and IV Heparin given concern for possible PE.  He was also given a dose of Azithromycin in the ED.  SOB improved with diuresis, but still on 2L O2NC with Pox in mid-90s.  Given ongoing hypoxia (possibly due to underlying emphysema) with elevated D-dimer, changed IV Heparin to po Apixaban 2.5mg bid on 10/25/2023 until can rule out PE - VQ scan normal on 10/26/2023, so discontinued full anticoagulation and resumed DVT prophylaxis.  6MWT on 10/27/2023 with Pox down to 88% with exertion and repeat down to 85% with walking.  Will need home oxygen 2L NC for walking/exertion on discharge.      Plan:  Follow up with Pulmonology on discharge - referral placed  Discharge with Breo Ellipta and and Combivent  Continue with O2NC 2L on discharge  Low Na diet and resume HCTZ on discharge      Final Active Diagnoses:    Diagnosis Date Noted POA    PRINCIPAL PROBLEM:  Acute hypoxic respiratory failure [J96.01] 10/23/2023 Yes    Acute kidney injury superimposed on CKD [N17.9, N18.9] 07/29/2019 Yes    Hemiparesis affecting right side as late effect of stroke [I69.351] 05/10/2021 Not Applicable    Renal hypertension [I12.9] 07/29/2019 Yes    Gastroesophageal reflux disease without esophagitis [K21.9] 08/05/2019 Yes      Problems Resolved During this Admission:    Diagnosis Date Noted Date Resolved POA    Acute heart failure [I50.9]  10/23/2023 10/24/2023 Yes       Discharged Condition: fair    Disposition: Home-Health Care [facility]    Follow Up:   Follow-up Information     William Mabry MD. Schedule an appointment as soon as possible for a visit in 1 week(s).    Specialty: Family Medicine  Contact information:  [address] [facility] [address]  [phone]                       Patient Instructions:   No discharge procedures on file.    Significant Diagnostic Studies: Labs:   BMP:   Recent Labs   Lab 10/28/23  0436 10/28/23  1205 10/29/23  0435    85 87    138 137   K 4.2 4.4 4.5    107 106   CO2 25 24 25   BUN 31* 27* 25*   CREATININE 1.8* 1.6* 1.4   CALCIUM 8.9 8.8 8.8   MG 2.2  --  2.2    and CBC   Recent Labs   Lab 10/28/23  0436 10/29/23  0435   WBC 5.03 5.07   HGB 12.0* 12.4*   HCT 36.9* 37.5*    233       Pending Diagnostic Studies:     None         Medications:  Reconciled Home Medications:      Medication List      START taking these medications    docusate sodium 100 MG capsule  Commonly known as: COLACE  Take 1 capsule (100 mg total) by mouth Daily.        CHANGE how you take these medications    hydroCHLOROthiazide 12.5 MG Tab  Commonly known as: HYDRODIURIL  Take 1 tablet (12.5 mg total) by mouth once daily.  What changed:   · medication strength  · how much to take     losartan 50 MG tablet  Commonly known as: COZAAR  Take 1 tablet (50 mg total) by mouth once daily.  What changed: when to take this     NIFEdipine 60 MG (OSM) 24 hr tablet  Commonly known as: PROCARDIA-XL  Take 1 tablet (60 mg total) by mouth 2 (two) times a day.  What changed:   · when to take this  · Another medication with the same name was removed. Continue taking this medication, and follow the directions you see here.     simvastatin 40 MG tablet  Commonly known as: ZOCOR  TAKE 1 TABLET EVERY DAY  What changed: Another medication with the same name was removed. Continue taking this medication, and follow the directions you  see here.        CONTINUE taking these medications    aspirin 81 MG Chew  Take 81 mg by mouth once daily.     atorvastatin 40 MG tablet  Commonly known as: LIPITOR  Take 40 mg by mouth once daily.     carvediloL 6.25 MG tablet  Commonly known as: COREG  Take 6.25 mg by mouth 2 (two) times daily.     levocetirizine 5 MG tablet  Commonly known as: XYZAL  Take 5 mg by mouth every evening.     omega-3 acid ethyl esters 1 gram capsule  Commonly known as: LOVAZA  Take 2 g by mouth.     vitamin D 1000 units Tab  Commonly known as: VITAMIN D3  Take 1,000 Units by mouth.        STOP taking these medications    amLODIPine 10 MG tablet  Commonly known as: NORVASC     amLODIPine 5 MG tablet  Commonly known as: NORVASC     diclofenac sodium 1 % Gel  Commonly known as: VOLTAREN     losartan-hydrochlorothiazide 100-12.5 mg 100-12.5 mg Tab  Commonly known as: HYZAAR     naproxen 375 MG Tbec EC tablet  Commonly known as: EC-NAPROSYN            Indwelling Lines/Drains at time of discharge:   Lines/Drains/Airways     Drain  Duration           Male External Urinary Catheter 10/23/23 1318 Large 5 days                Time spent on the discharge of patient: 35 minutes         Sanchez Veras MD  Department of Hospital Medicine  [facility] Med [facility] ([address])

## 2023-10-29 NOTE — PLAN OF CARE
10/29/23 0844   Medicare Message   Important Message from Medicare regarding Discharge Appeal Rights Given to patient/caregiver;Explained to patient/caregiver;Signed/date by patient/caregiver   Date IMM was signed 10/29/23   Time IMM was signed 0825

## 2023-10-29 NOTE — PLAN OF CARE
[facility] ([address])      HOME HEALTH ORDERS  FACE TO FACE ENCOUNTER    Patient Name: Dwayne Saravia  YOB: 1934    PCP: William Mabry MD   PCP Address: [address] [place] [address]  PCP Phone Number: [phone]  PCP Fax: [phone]    Encounter Date: 10/23/23    Admit to Home Health    Diagnoses:  Active Hospital Problems    Diagnosis  POA    *Acute hypoxic respiratory failure [J96.01]  Yes     Priority: 1 - High    Acute kidney injury superimposed on CKD [N17.9, N18.9]  Yes     Priority: 2     Hemiparesis affecting right side as late effect of stroke [I69.351]  Not Applicable     Priority: 3     Renal hypertension [I12.9]  Yes     Priority: 4     Gastroesophageal reflux disease without esophagitis [K21.9]  Yes      Resolved Hospital Problems    Diagnosis Date Resolved POA    Acute heart failure [I50.9] 10/24/2023 Yes       Follow Up Appointments:  Future Appointments   Date Time Provider Department Center   10/31/2023  3:00 PM [facility]       Allergies:  Review of patient's allergies indicates:   Allergen Reactions    Keflex [cephalexin]        Medications: Review discharge medications with patient and family and provide education.    Current Facility-Administered Medications   Medication Dose Route Frequency Provider Last Rate Last Admin    acetaminophen tablet 1,000 mg  1,000 mg Oral Q8H PRN Tee Kern MD   1,000 mg at 10/29/23 0447    aspirin chewable tablet 81 mg  81 mg Oral Daily Tee Kern MD   81 mg at 10/29/23 0901    atorvastatin tablet 40 mg  40 mg Oral Daily Tee Kern MD   40 mg at 10/29/23 0901    docusate sodium capsule 100 mg  100 mg Oral BID Sanchez Veras MD   100 mg at 10/29/23 0901    heparin (porcine) injection 5,000 Units  5,000 Units Subcutaneous Q8H Sanchez Veras MD   5,000 Units at 10/28/23 2036    losartan tablet 50 mg  50 mg Oral Daily Sanchez Veras MD   50 mg at  10/29/23 1204    morphine injection 2 mg  2 mg Intravenous Q4H PRN Tee Kern MD   2 mg at 10/26/23 2115    NIFEdipine 24 hr tablet 60 mg  60 mg Oral BID Sanchez Veras MD   60 mg at 10/29/23 0901    pantoprazole injection 40 mg  40 mg Intravenous Daily Tee Kern MD   40 mg at 10/29/23 0901    polyethylene glycol packet 17 g  17 g Oral Daily Sanchez Veras MD   17 g at 10/29/23 0901    sodium chloride 0.9% flush 10 mL  10 mL Intravenous PRN Tee Kern MD         Current Discharge Medication List        START taking these medications    Details   docusate sodium (COLACE) 100 MG capsule Take 1 capsule (100 mg total) by mouth Daily.  Qty: 30 capsule, Refills: 0           CONTINUE these medications which have CHANGED    Details   hydroCHLOROthiazide (HYDRODIURIL) 12.5 MG Tab Take 1 tablet (12.5 mg total) by mouth once daily.  Qty: 30 tablet, Refills: 0    Comments: .      losartan (COZAAR) 50 MG tablet Take 1 tablet (50 mg total) by mouth once daily.  Qty: 30 tablet, Refills: 1    Comments: .      NIFEdipine (PROCARDIA-XL) 60 MG (OSM) 24 hr tablet Take 1 tablet (60 mg total) by mouth 2 (two) times a day.  Qty: 60 tablet, Refills: 1    Comments: .           CONTINUE these medications which have NOT CHANGED    Details   aspirin 81 MG Chew Take 81 mg by mouth once daily.      simvastatin (ZOCOR) 40 MG tablet TAKE 1 TABLET EVERY DAY  Qty: 90 tablet, Refills: 1      vitamin D (VITAMIN D3) 1000 units Tab Take 1,000 Units by mouth.      atorvastatin (LIPITOR) 40 MG tablet Take 40 mg by mouth once daily.      carvediloL (COREG) 6.25 MG tablet Take 6.25 mg by mouth 2 (two) times daily.      levocetirizine (XYZAL) 5 MG tablet Take 5 mg by mouth every evening.      omega-3 acid ethyl esters (LOVAZA) 1 gram capsule Take 2 g by mouth.           STOP taking these medications       diclofenac sodium (VOLTAREN) 1 % Gel Comments:   Reason for Stopping:         losartan-hydrochlorothiazide 100-12.5 mg  (HYZAAR) 100-12.5 mg Tab Comments:   Reason for Stopping:         naproxen (EC-NAPROSYN) 375 MG TbEC EC tablet Comments:   Reason for Stopping:         amLODIPine (NORVASC) 10 MG tablet Comments:   Reason for Stopping:         amLODIPine (NORVASC) 5 MG tablet Comments:   Reason for Stopping:         NIFEdipine (ADALAT CC) 60 MG TbSR Comments:   Reason for Stopping:                 I have seen and examined this patient within the last 30 days. My clinical findings that support the need for the home health skilled services and home bound status are the following:no   Weakness/numbness causing balance and gait disturbance due to Weakness/Debility making it taxing to leave home.  Medical restrictions requiring assistance of another human to leave home due to  Home oxygen requirement.     Diet:   cardiac diet and 2 gram sodium diet    Labs:  As needed    Referrals/ Consults  Physical Therapy to evaluate and treat. Evaluate for home safety and equipment needs; Establish/upgrade home exercise program. Perform / instruct on therapeutic exercises, gait training, transfer training, and Range of Motion.  Occupational Therapy to evaluate and treat. Evaluate home environment for safety and equipment needs. Perform/Instruct on transfers, ADL training, ROM, and therapeutic exercises.  Aide to provide assistance with personal care, ADLs, and vital signs.    Activities:   activity as tolerated    Nursing:   Agency to admit patient within 24 hours of hospital discharge unless specified on physician order or at patient request    SN to complete comprehensive assessment including routine vital signs. Instruct on disease process and s/s of complications to report to MD. Review/verify medication list sent home with the patient at time of discharge  and instruct patient/caregiver as needed. Frequency may be adjusted depending on start of care date.     Skilled nurse to perform up to 3 visits PRN for symptoms related to diagnosis    Notify  MD if SBP > 160 or < 90; DBP > 90 or < 50; HR > 120 or < 50; Temp > 101; O2 < 88%; Other:       Ok to schedule additional visits based on staff availability and patient request on consecutive days within the home health episode.    When multiple disciplines ordered:    Start of Care occurs on Sunday - Wednesday schedule remaining discipline evaluations as ordered on separate consecutive days following the start of care.    Thursday SOC -schedule subsequent evaluations Friday and Monday the following week.     Friday - Saturday SOC - schedule subsequent discipline evaluations on consecutive days starting Monday of the following week.    For all post-discharge communication and subsequent orders please contact patient's primary care physician.    Miscellaneous   Routine Skin for Bedridden Patients: Instruct patient/caregiver to apply moisture barrier cream to all skin folds and wet areas in perineal area daily and after baths and all bowel movements.  Heart Failure:      SN to instruct on the following:    Instruct on the definition of CHF.   Instruct on the signs/sympoms of CHF to be reported.   Instruct on and monitor daily weights.   Instruct on factors that cause exacerbation.   Instruct on action, dose, schedule, and side effects of medications.   Instruct on diet as prescribed.   Instruct on activity allowed.   Instruct on life-style modifications for life long management of CHF   SN to assess compliance with daily weights, diet, medications, fluid retention,    safety precautions, activities permitted and life-style modifications.   Additional 1-2 SN visits per week as needed for signs and symptoms     of CHF exacerbation.      For Weight Gain > 2-3 lbs in 1 day or 4-6 lbs over 1 week notify PCP.    Home Oxygen:  Oxygen at 2L L/min nasal canula to be used:  As needed for SOB. - also to be used with ambulating.    Home Health Aide:  Nursing Weekly, Physical Therapy Three times weekly, Occupational Therapy Three  times weekly, Respiratory Therapy Weekly, and Home Health Aide Daily    Wound Care Orders  no    I certify that this patient is confined to his home and needs intermittent skilled nursing care, physical therapy, and occupational therapy.

## 2023-10-29 NOTE — ASSESSMENT & PLAN NOTE
Creatinine on admission was 1.5 from a baseline of around 1.5 (2019).  Creatinine bumped to 1.9 and 2.0 after starting IV Lasix and after CTA of C/A/P with contrast.  Uric Acid elevated at 7.8 and FEUrea 28.5 suggests a pre-renal component.  Urine Eos negative.  US of Kidneys on 10/25/2023 without evidence of obrstuction. Creatinine down, but plateau at 1.8 yesterday.  Given LR 250ml bolus x 2 with improvement of creatinine to 1.4.  -Increase oral fluids over the next 2 days  -Restart Losartan at 50mg as below  -PCP follow up in 1 week

## 2023-10-29 NOTE — DISCHARGE INSTRUCTIONS
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  Continue with a low salt diet.  Because you will be on a low dose of a diuretic, please continue to increase your oral fluids for the next 1-2 days after discharge.  Your medications were changed during this admission - please stop the Coreg (carvedilol), as your heart rate was low. Your Losartan-HCTZ combination pill was stopped and  into individual pills.  You will be discharge oh home oxygen at 2L by Nasal Canula to use mostly with walking or activity.  I will place a referral for Pulmonology to follow up with your Lung Disease.  Please continue the following medications as below:    Losartan 50mg once a day (reduced dose)  Hydrochlorothiazide 12.5mg once a day in the morning (reduced dose)  Nifedipine XL 60mg twice a day (increased dose)  Combivent (ipratropium-albuterol) 1 puff every 6 hours as needed for shortness of breath or wheezing (new)  Breo Ellipta (fluticasone-vilanterol) 1 puff daily (new)

## 2023-10-29 NOTE — ASSESSMENT & PLAN NOTE
SBP improved, but still a little elevated.  Carvedilol d/c given bradycardia.    Home Meds:  Nifedipine XL 60mg, Carvedilol 6.25mg bid, Losartan 100mg, HCTZ 25mg  Hospital Meds:  Nifedipine XL 60mg bid  -Continue Nifedipine XL 60mg bid  -Restart Losartan at 50mg once a day  -Restart HCTZ 12.5mg once a day  -Monitor at home and follow up with PCP

## 2023-10-29 NOTE — ASSESSMENT & PLAN NOTE
Patient with history of Emphysema who presented with acute respiratory distress.  Patient reports sudden onset of left-sided flank pain while in bed and associated nausea and vomiting.  No dysuria or frequency.  Pox in 80s on RA.  CXR on admission with question patchy opacities in the medial lung bases, which could relate to atelectasis, aspiration or pneumonia.  CTA of C/A/P with No evidence of thoracic or abdominal aorta dissection.  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  No reports of PE, but study was not a good study to rule out pulmonary embolus per Radiology.  US of LE Veins with no evidence of lower extremity deep venous thrombosis.  TTE on admission with EF of 65-70% and Grade I DD.  Labs on admission with Trop negative x 3, , Lactate normal, Procal negative, VBG pH of 7.347 and pCO2 of 46.8, D-Dimers 2.53.  Patient started on IV Lasix 40mg and IV Heparin given concern for possible PE.  He was also given a dose of Azithromycin in the ED.  SOB improved with diuresis, but still on 2L O2NC with Pox in mid-90s.  Given ongoing hypoxia (possibly due to underlying emphysema) with elevated D-dimer, changed IV Heparin to po Apixaban 2.5mg bid on 10/25/2023 until can rule out PE - VQ scan normal on 10/26/2023, so discontinued full anticoagulation and resumed DVT prophylaxis.  6MWT on 10/27/2023 with Pox down to 88% with exertion and repeat down to 85% with walking.  Will need home oxygen 2L NC for walking/exertion on discharge.      Plan:  Follow up with Pulmonology on discharge - referral placed  Discharge with Breo Ellipta and and Combivent  Continue with O2NC 2L on discharge  Low Na diet and resume HCTZ on discharge

## 2023-10-29 NOTE — PLAN OF CARE
10/29/23 0843   Final Note   Assessment Type Final Discharge Note   Anticipated Discharge Disposition Home   Hospital Resources/Appts/Education Provided Provided patient/caregiver with written discharge plan information;Appointments scheduled and added to AVS   Post-Acute Status   Discharge Delays None known at this time       Patient will discharge home with family & home health.     Patient hs no preference of home health agency.    Patient choice signed.  Patient will discharge with home 02.(DME)    Patient wife will make PCP follow up 10/30/23 (Monday)     Patient daughter will  his meds at [facility].      Patient wife will transport him home at discharge.    SW delivered patient 02 to the bedside.

## 2023-11-03 NOTE — CONSULTS
[facility] [place] ([address])  Wound Care    Patient Name:  Dwayne Saravia   MRN:  [id number]  Date: 10/25/2023  Diagnosis: Acute hypoxic respiratory failure    History:     Past Medical History:   Diagnosis Date    Arthritis     BPH (benign prostatic hyperplasia)     CVA (cerebral vascular accident) 2007    Hypertension        Social History     Socioeconomic History    Marital status:    Occupational History    Occupation: [employer]     Comment: Construction Work   Tobacco Use    Smoking status: Former     Current packs/day: 0.00     Types: Cigarettes     Start date: 1950     Quit date: 2018     Years since quittin.7    Smokeless tobacco: Never   Substance and Sexual Activity    Alcohol use: No    Drug use: No    Sexual activity: Not Currently     Social Determinants of Health     Financial Resource Strain: Low Risk  (2020)    Overall Financial Resource Strain (CARDIA)     Difficulty of Paying Living Expenses: Not hard at all   Food Insecurity: No Food Insecurity (2020)    Hunger Vital Sign     Worried About Running Out of Food in the Last Year: Never true     Ran Out of Food in the Last Year: Never true   Transportation Needs: No Transportation Needs (2020)    PRAPARE - Transportation     Lack of Transportation (Medical): No     Lack of Transportation (Non-Medical): No   Physical Activity: Insufficiently Active (2020)    Exercise Vital Sign     Days of Exercise per Week: 2 days     Minutes of Exercise per Session: 30 min   Stress: No Stress Concern Present (2020)    [place] [employer] - Occupational Stress Questionnaire     Feeling of Stress : Not at all   Social Connections: Moderately Isolated (2020)    Social Connection and Isolation Panel [NHANES]     Frequency of Communication with Friends and Family: Once a week     Frequency of Social Gatherings with Friends and Family: Once a week     Attends [employer] Services: 1 to 4 times per  year     Active Member of Clubs or Organizations: No     Attends Club or Organization Meetings: Never     Marital Status:        Precautions:     Allergies as of 10/23/2023 - Reviewed 10/23/2023   Allergen Reaction Noted    Keflex [cephalexin]  03/27/2013       [facility] Assessment Details/Treatment     Wound care consult received for assessment of right upper arm after IV infiltration with heparin infusing. Patient is 88-year-old man with history of hypertension, chronic kidney disease stage III, dyslipidemia, chronic obstructive pulmonary disease, prior tobacco smoker (2 packs per week for 15 years), prior stroke with residual right-sided weakness who walks with walker at baseline who presents to the emergency department with respiratory distress with tachypnea new onset hypoxia.     Upon assessment to right arm noted intact skin with minimal swelling. Mild redness noted. Skin is warm and patient denies pain at this time. Heparin now infusing in left arm PIV.     Recommend nursing to continue monitoring the sites for any changes and keep arm elevated. Nursing notified. Wound care to sign off. Please re-consult for any changes or concerns.     Right arm             10/25/2023     You can access the FollowMyHealth Patient Portal offered by [facility] by registering at the following website: [email or web address]. By joining [employer] [facility]’s FollowMyHealth portal, you will also be able to view your health information using other applications (apps) compatible with our system.

## 2023-11-17 ENCOUNTER — OFFICE VISIT (OUTPATIENT)
Dept: PRIMARY CARE CLINIC | Facility: CLINIC | Age: 88
End: 2023-11-17
Payer: MEDICARE

## 2023-11-17 VITALS
HEIGHT: 68 IN | SYSTOLIC BLOOD PRESSURE: 130 MMHG | WEIGHT: 156.31 LBS | HEART RATE: 64 BPM | OXYGEN SATURATION: 90 % | BODY MASS INDEX: 23.69 KG/M2 | DIASTOLIC BLOOD PRESSURE: 80 MMHG

## 2023-11-17 DIAGNOSIS — I10 ESSENTIAL HYPERTENSION: ICD-10-CM

## 2023-11-17 DIAGNOSIS — N18.32 CKD STAGE G3B/A2, GFR 30-44 AND ALBUMIN CREATININE RATIO 30-299 MG/G: ICD-10-CM

## 2023-11-17 DIAGNOSIS — J43.8 OTHER EMPHYSEMA: ICD-10-CM

## 2023-11-17 DIAGNOSIS — Z86.73 H/O: CVA (CEREBROVASCULAR ACCIDENT): Primary | ICD-10-CM

## 2023-11-17 DIAGNOSIS — Z13.6 SCREENING FOR AAA (ABDOMINAL AORTIC ANEURYSM): ICD-10-CM

## 2023-11-17 PROBLEM — I12.9 RENAL HYPERTENSION: Status: RESOLVED | Noted: 2019-07-29 | Resolved: 2023-11-17

## 2023-11-17 PROCEDURE — 1111F DSCHRG MED/CURRENT MED MERGE: CPT | Mod: CPTII,S$GLB,, | Performed by: STUDENT IN AN ORGANIZED HEALTH CARE EDUCATION/TRAINING PROGRAM

## 2023-11-17 PROCEDURE — 99999 PR PBB SHADOW E&M-EST. PATIENT-LVL V: ICD-10-PCS | Mod: PBBFAC,,, | Performed by: STUDENT IN AN ORGANIZED HEALTH CARE EDUCATION/TRAINING PROGRAM

## 2023-11-17 PROCEDURE — 1100F PR PT FALLS ASSESS DOC 2+ FALLS/FALL W/INJURY/YR: ICD-10-PCS | Mod: CPTII,S$GLB,, | Performed by: STUDENT IN AN ORGANIZED HEALTH CARE EDUCATION/TRAINING PROGRAM

## 2023-11-17 PROCEDURE — 1100F PTFALLS ASSESS-DOCD GE2>/YR: CPT | Mod: CPTII,S$GLB,, | Performed by: STUDENT IN AN ORGANIZED HEALTH CARE EDUCATION/TRAINING PROGRAM

## 2023-11-17 PROCEDURE — 1126F PR PAIN SEVERITY QUANTIFIED, NO PAIN PRESENT: ICD-10-PCS | Mod: CPTII,S$GLB,, | Performed by: STUDENT IN AN ORGANIZED HEALTH CARE EDUCATION/TRAINING PROGRAM

## 2023-11-17 PROCEDURE — 1159F MED LIST DOCD IN RCRD: CPT | Mod: CPTII,S$GLB,, | Performed by: STUDENT IN AN ORGANIZED HEALTH CARE EDUCATION/TRAINING PROGRAM

## 2023-11-17 PROCEDURE — 1160F PR REVIEW ALL MEDS BY PRESCRIBER/CLIN PHARMACIST DOCUMENTED: ICD-10-PCS | Mod: CPTII,S$GLB,, | Performed by: STUDENT IN AN ORGANIZED HEALTH CARE EDUCATION/TRAINING PROGRAM

## 2023-11-17 PROCEDURE — 1160F RVW MEDS BY RX/DR IN RCRD: CPT | Mod: CPTII,S$GLB,, | Performed by: STUDENT IN AN ORGANIZED HEALTH CARE EDUCATION/TRAINING PROGRAM

## 2023-11-17 PROCEDURE — 99214 PR OFFICE/OUTPT VISIT, EST, LEVL IV, 30-39 MIN: ICD-10-PCS | Mod: S$GLB,,, | Performed by: STUDENT IN AN ORGANIZED HEALTH CARE EDUCATION/TRAINING PROGRAM

## 2023-11-17 PROCEDURE — 99214 OFFICE O/P EST MOD 30 MIN: CPT | Mod: S$GLB,,, | Performed by: STUDENT IN AN ORGANIZED HEALTH CARE EDUCATION/TRAINING PROGRAM

## 2023-11-17 PROCEDURE — 99999 PR PBB SHADOW E&M-EST. PATIENT-LVL V: CPT | Mod: PBBFAC,,, | Performed by: STUDENT IN AN ORGANIZED HEALTH CARE EDUCATION/TRAINING PROGRAM

## 2023-11-17 PROCEDURE — 3288F PR FALLS RISK ASSESSMENT DOCUMENTED: ICD-10-PCS | Mod: CPTII,S$GLB,, | Performed by: STUDENT IN AN ORGANIZED HEALTH CARE EDUCATION/TRAINING PROGRAM

## 2023-11-17 PROCEDURE — 3288F FALL RISK ASSESSMENT DOCD: CPT | Mod: CPTII,S$GLB,, | Performed by: STUDENT IN AN ORGANIZED HEALTH CARE EDUCATION/TRAINING PROGRAM

## 2023-11-17 PROCEDURE — 1126F AMNT PAIN NOTED NONE PRSNT: CPT | Mod: CPTII,S$GLB,, | Performed by: STUDENT IN AN ORGANIZED HEALTH CARE EDUCATION/TRAINING PROGRAM

## 2023-11-17 PROCEDURE — 1111F PR DISCHARGE MEDS RECONCILED W/ CURRENT OUTPATIENT MED LIST: ICD-10-PCS | Mod: CPTII,S$GLB,, | Performed by: STUDENT IN AN ORGANIZED HEALTH CARE EDUCATION/TRAINING PROGRAM

## 2023-11-17 PROCEDURE — 1159F PR MEDICATION LIST DOCUMENTED IN MEDICAL RECORD: ICD-10-PCS | Mod: CPTII,S$GLB,, | Performed by: STUDENT IN AN ORGANIZED HEALTH CARE EDUCATION/TRAINING PROGRAM

## 2023-11-17 RX ORDER — CARVEDILOL 6.25 MG/1
6.25 TABLET ORAL 2 TIMES DAILY
COMMUNITY
Start: 2023-10-31

## 2023-11-17 RX ORDER — LOSARTAN POTASSIUM 100 MG/1
100 TABLET ORAL
COMMUNITY
Start: 2023-10-31

## 2023-11-17 RX ORDER — NAPROXEN 250 MG/1
250 TABLET ORAL 2 TIMES DAILY
COMMUNITY
Start: 2023-10-31 | End: 2023-11-17

## 2023-11-17 NOTE — PATIENT INSTRUCTIONS
DO NOT take naproxen (Naprosyn).  Please please use oxygen when you walk or exert yourself.  Patient portal code - [id number]

## 2023-11-17 NOTE — PROGRESS NOTES
Office visit  Patient: Mr. Dwayne Saravia   11/17/2023     Assessment:     1. H/O: CVA (cerebrovascular accident)    2. Essential hypertension    3. CKD stage G3b/A2, GFR 30-44 and albumin creatinine ratio  mg/g    4. Other emphysema      Plan:       1. H/O: CVA (cerebrovascular accident)  Overview:  2005, 3/2008          -Stable, uses a walker, would like a Rollator        -form for handicap tag provided    2. Essential hypertension       -Stable, continue losartan, hctz, and nifedipine    3. CKD stage G3b/A2, GFR 30-44 and albumin creatinine ratio  mg/g        -Stop naproxen    4. Other emphysema  Overview:  With  Blebs         -extensive discussion with patient about his need for oxygen, but he refuses to use it; he reports that he has no shortness of breath and feels like he does not need it      -has appointment to see Pulm in January    Follow-up in 2 months or sooner as needed.      CHIEF COMPLAINT:  Hospital follow-up    HPI: Dwayne Saravia is a 88 y.o. male who presents for a follow up of hospitalization. He is accompanied by his wife. He was sent home with oxygen, but he hasn't been using it at home. He was told to use 1 Lpm.  He would like a rollator - walker with a seat on it.  He was hospitalized with acute hypoxic respiratory failure.  He was diuresed, which improved his oxygenation.  6 minute walk test showed that he needed 2 L oxygen with exertion.  He was also sent home with inhalers for COPD.    Review of Systems   Constitutional:  Negative for chills and fever.   Respiratory:  Negative for cough and shortness of breath.    Cardiovascular:  Negative for chest pain and palpitations.   Gastrointestinal:  Negative for constipation, diarrhea, nausea and vomiting.         Current Outpatient Medications   Medication Instructions    aspirin 81 mg, Daily    atorvastatin (LIPITOR) 40 mg, Oral, Daily    carvediloL (COREG) 6.25 mg, Oral, 2 times daily    docusate sodium (COLACE) 100 mg, Oral,  "Daily    fluticasone furoate-vilanteroL (BREO ELLIPTA) 100-25 mcg/dose diskus inhaler 1 puff, Inhalation, Daily, Controller    hydroCHLOROthiazide (HYDRODIURIL) 12.5 mg, Oral, Daily    ipratropium-albuteroL (COMBIVENT)  mcg/actuation inhaler 1 puff, Inhalation, Every 6 hours PRN, Rescue    levocetirizine (XYZAL) 5 mg, Oral, Nightly    losartan (COZAAR) 100 mg, Oral    naproxen (NAPROSYN) 250 mg, Oral, 2 times daily    NIFEdipine (PROCARDIA-XL) 60 mg, Oral, 2 times daily    omega-3 acid ethyl esters (LOVAZA) 2 g, Oral    simvastatin (ZOCOR) 40 MG tablet TAKE 1 TABLET EVERY DAY    vitamin D (VITAMIN D3) 1,000 Units, Oral       Lab Results   Component Value Date    HGBA1C 5.7 (H) 10/24/2023    HGBA1C 6.1 (H) 08/28/2020     No results found for: "MICALBCREAT"  Lab Results   Component Value Date    LDLCALC 61.6 (L) 05/10/2021    LDLCALC 63.2 08/28/2020    CHOL 122 05/10/2021    HDL 41 05/10/2021    TRIG 97 05/10/2021       Lab Results   Component Value Date     10/29/2023    K 4.5 10/29/2023     10/29/2023    CO2 25 10/29/2023    GLU 87 10/29/2023    BUN 25 (H) 10/29/2023    CREATININE 1.4 10/29/2023    CALCIUM 8.8 10/29/2023    PROT 7.8 10/23/2023    ALBUMIN 3.7 10/23/2023    BILITOT 0.6 10/23/2023    ALKPHOS 107 10/23/2023    AST 23 10/23/2023    ALT 14 10/23/2023    ANIONGAP 6 (L) 10/29/2023    ESTGFRAFRICA 57.1 (A) 05/10/2021    EGFRNONAA 49.4 (A) 05/10/2021    WBC 5.07 10/29/2023    HGB 12.4 (L) 10/29/2023    HGB 12.0 (L) 10/28/2023    HCT 37.5 (L) 10/29/2023    MCV 94 10/29/2023     10/29/2023    TSH 1.354 08/28/2020    PSA 22 (H) 11/19/2008    HEPCAB Negative 10/23/2023       Lab Results   Component Value Date    RFIZJHIM44 698 11/20/2008    FERRITIN 462 (H) 11/20/2008    IRON 101 11/20/2008    TIBC 291 11/20/2008    FESATURATED 35 11/20/2008         Past Medical History:   Diagnosis Date    Arthritis     BPH (benign prostatic hyperplasia)     CVA (cerebral vascular accident) 2007    " Hypertension      Past Surgical History:   Procedure Laterality Date    PROSTATE SURGERY      2016     There were no vitals filed for this visit.  Objective:   Physical Exam  Constitutional:       General: He is not in acute distress.  HENT:      Head: Normocephalic and atraumatic.      Right Ear: External ear normal.      Left Ear: External ear normal.      Nose: No rhinorrhea.      Mouth/Throat:      Mouth: Mucous membranes are moist.   Eyes:      General: No scleral icterus.        Right eye: No discharge.         Left eye: No discharge.      Conjunctiva/sclera: Conjunctivae normal.   Cardiovascular:      Rate and Rhythm: Normal rate and regular rhythm.      Heart sounds: Normal heart sounds. No murmur heard.     No friction rub. No gallop.   Pulmonary:      Effort: Pulmonary effort is normal. No respiratory distress.      Breath sounds: No wheezing, rhonchi or rales.   Musculoskeletal:      Right lower leg: No edema.      Left lower leg: No edema.   Skin:     General: Skin is warm and dry.   Neurological:      Mental Status: He is alert and oriented to person, place, and time.   Psychiatric:         Mood and Affect: Mood normal.         Behavior: Behavior normal.             Frieda Galvan MD  Internal Medicine and Pediatrics

## 2023-11-29 RX ORDER — HYDROCHLOROTHIAZIDE 12.5 MG/1
12.5 TABLET ORAL DAILY
Qty: 30 TABLET | Refills: 11 | Status: SHIPPED | OUTPATIENT
Start: 2023-11-29 | End: 2024-11-28

## 2024-01-03 ENCOUNTER — OFFICE VISIT (OUTPATIENT)
Dept: PULMONOLOGY | Facility: CLINIC | Age: 89
End: 2024-01-03
Payer: MEDICARE

## 2024-01-03 VITALS
BODY MASS INDEX: 23.63 KG/M2 | HEART RATE: 63 BPM | OXYGEN SATURATION: 95 % | WEIGHT: 155.88 LBS | HEIGHT: 68 IN | DIASTOLIC BLOOD PRESSURE: 80 MMHG | SYSTOLIC BLOOD PRESSURE: 130 MMHG

## 2024-01-03 DIAGNOSIS — J96.01 ACUTE HYPOXIC RESPIRATORY FAILURE: ICD-10-CM

## 2024-01-03 DIAGNOSIS — J43.8 OTHER EMPHYSEMA: ICD-10-CM

## 2024-01-03 PROCEDURE — 3288F FALL RISK ASSESSMENT DOCD: CPT | Mod: HCNC,CPTII,S$GLB, | Performed by: INTERNAL MEDICINE

## 2024-01-03 PROCEDURE — 1101F PT FALLS ASSESS-DOCD LE1/YR: CPT | Mod: HCNC,CPTII,S$GLB, | Performed by: INTERNAL MEDICINE

## 2024-01-03 PROCEDURE — 1159F MED LIST DOCD IN RCRD: CPT | Mod: HCNC,CPTII,S$GLB, | Performed by: INTERNAL MEDICINE

## 2024-01-03 PROCEDURE — 99999 PR PBB SHADOW E&M-EST. PATIENT-LVL III: CPT | Mod: PBBFAC,HCNC,, | Performed by: INTERNAL MEDICINE

## 2024-01-03 PROCEDURE — 99203 OFFICE O/P NEW LOW 30 MIN: CPT | Mod: HCNC,S$GLB,, | Performed by: INTERNAL MEDICINE

## 2024-01-29 PROBLEM — N17.9 ACUTE KIDNEY INJURY SUPERIMPOSED ON CKD: Status: RESOLVED | Noted: 2019-07-29 | Resolved: 2024-01-29

## 2024-01-29 PROBLEM — N18.9 ACUTE KIDNEY INJURY SUPERIMPOSED ON CKD: Status: RESOLVED | Noted: 2019-07-29 | Resolved: 2024-01-29

## 2024-04-09 ENCOUNTER — HOSPITAL ENCOUNTER (INPATIENT)
Facility: OTHER | Age: 89
LOS: 5 days | Discharge: NURSING, REHAB OR LONG-TERM CARE | DRG: 872 | End: 2024-04-15
Attending: EMERGENCY MEDICINE | Admitting: STUDENT IN AN ORGANIZED HEALTH CARE EDUCATION/TRAINING PROGRAM
Payer: MEDICARE

## 2024-04-09 DIAGNOSIS — D72.829 LEUKOCYTOSIS, UNSPECIFIED TYPE: ICD-10-CM
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
## 2024-04-09 DIAGNOSIS — N30.01 ACUTE CYSTITIS WITH HEMATURIA: ICD-10-CM

## 2024-04-09 DIAGNOSIS — E86.0 DEHYDRATION: ICD-10-CM

## 2024-04-09 DIAGNOSIS — N39.0 UTI (URINARY TRACT INFECTION): ICD-10-CM

## 2024-04-09 DIAGNOSIS — N17.9 ACUTE KIDNEY INJURY: ICD-10-CM

## 2024-04-09 DIAGNOSIS — R41.82 ALTERED MENTAL STATUS, UNSPECIFIED ALTERED MENTAL STATUS TYPE: Primary | ICD-10-CM

## 2024-04-09 DIAGNOSIS — R53.83 FATIGUE: ICD-10-CM

## 2024-04-09 DIAGNOSIS — Z91.89 AT RISK FOR LONG QT SYNDROME: ICD-10-CM

## 2024-04-09 DIAGNOSIS — R53.83 FATIGUE, UNSPECIFIED TYPE: ICD-10-CM

## 2024-04-09 LAB
BASOPHILS # BLD AUTO: 0.04 K/UL (ref 0–0.2)
BASOPHILS NFR BLD: 0.2 % (ref 0–1.9)
BNP SERPL-MCNC: 123 PG/ML (ref 0–99)
DIFFERENTIAL METHOD BLD: ABNORMAL
EOSINOPHIL # BLD AUTO: 0 K/UL (ref 0–0.5)
EOSINOPHIL NFR BLD: 0 % (ref 0–8)
ERYTHROCYTE [DISTWIDTH] IN BLOOD BY AUTOMATED COUNT: 15.1 % (ref 11.5–14.5)
HCT VFR BLD AUTO: 40.7 % (ref 40–54)
HGB BLD-MCNC: 13.4 G/DL (ref 14–18)
IMM GRANULOCYTES # BLD AUTO: 0.18 K/UL (ref 0–0.04)
IMM GRANULOCYTES NFR BLD AUTO: 0.8 % (ref 0–0.5)
LYMPHOCYTES # BLD AUTO: 0.9 K/UL (ref 1–4.8)
LYMPHOCYTES NFR BLD: 4.1 % (ref 18–48)
MCH RBC QN AUTO: 31.3 PG (ref 27–31)
MCHC RBC AUTO-ENTMCNC: 32.9 G/DL (ref 32–36)
MCV RBC AUTO: 95 FL (ref 82–98)
MONOCYTES # BLD AUTO: 2.6 K/UL (ref 0.3–1)
MONOCYTES NFR BLD: 11.2 % (ref 4–15)
NEUTROPHILS # BLD AUTO: 19.2 K/UL (ref 1.8–7.7)
NEUTROPHILS NFR BLD: 83.7 % (ref 38–73)
NRBC BLD-RTO: 0 /100 WBC
PLATELET # BLD AUTO: 184 K/UL (ref 150–450)
PLATELET BLD QL SMEAR: ABNORMAL
PMV BLD AUTO: 12.8 FL (ref 9.2–12.9)
RBC # BLD AUTO: 4.28 M/UL (ref 4.6–6.2)
TROPONIN I SERPL DL<=0.01 NG/ML-MCNC: 0.04 NG/ML (ref 0–0.03)
WBC # BLD AUTO: 22.92 K/UL (ref 3.9–12.7)

## 2024-04-09 PROCEDURE — 99285 EMERGENCY DEPT VISIT HI MDM: CPT | Mod: 25

## 2024-04-09 PROCEDURE — 85025 COMPLETE CBC W/AUTO DIFF WBC: CPT | Performed by: EMERGENCY MEDICINE

## 2024-04-09 PROCEDURE — 83880 ASSAY OF NATRIURETIC PEPTIDE: CPT | Performed by: EMERGENCY MEDICINE

## 2024-04-09 PROCEDURE — 84484 ASSAY OF TROPONIN QUANT: CPT | Performed by: EMERGENCY MEDICINE

## 2024-04-09 PROCEDURE — 93005 ELECTROCARDIOGRAM TRACING: CPT

## 2024-04-09 PROCEDURE — 96361 HYDRATE IV INFUSION ADD-ON: CPT

## 2024-04-09 PROCEDURE — 96365 THER/PROPH/DIAG IV INF INIT: CPT

## 2024-04-09 PROCEDURE — 93010 ELECTROCARDIOGRAM REPORT: CPT | Mod: ,,, | Performed by: INTERNAL MEDICINE

## 2024-04-09 NOTE — Clinical Note
Diagnosis: Acute kidney injury [787518]   Future Attending Provider: LAYLA MILLER [[id number]]   Is the patient being sent to ED Observation?: No   Special Needs:: Fall Risk [15]

## 2024-04-10 PROBLEM — A41.9 SEPSIS: Status: RESOLVED | Noted: 2024-04-10 | Resolved: 2024-04-10

## 2024-04-10 PROBLEM — N30.00 ACUTE CYSTITIS: Status: ACTIVE | Noted: 2024-04-10

## 2024-04-10 PROBLEM — I69.322 DYSARTHRIA FOLLOWING CEREBRAL INFARCTION: Status: RESOLVED | Noted: 2019-08-29 | Resolved: 2024-04-10

## 2024-04-10 PROBLEM — N17.9 ACUTE RENAL FAILURE SUPERIMPOSED ON STAGE 3 CHRONIC KIDNEY DISEASE: Status: ACTIVE | Noted: 2024-04-10

## 2024-04-10 PROBLEM — E21.3 HYPERPARATHYROIDISM, UNSPECIFIED: Status: ACTIVE | Noted: 2024-04-10

## 2024-04-10 PROBLEM — A41.9 SEPSIS: Status: ACTIVE | Noted: 2024-04-10

## 2024-04-10 PROBLEM — I73.9 PERIPHERAL VASCULAR DISEASE, UNSPECIFIED: Status: ACTIVE | Noted: 2024-04-10

## 2024-04-10 PROBLEM — N18.30 ACUTE RENAL FAILURE SUPERIMPOSED ON STAGE 3 CHRONIC KIDNEY DISEASE: Status: ACTIVE | Noted: 2024-04-10

## 2024-04-10 PROBLEM — G63 POLYNEUROPATHY IN DISEASES CLASSIFIED ELSEWHERE: Status: ACTIVE | Noted: 2024-04-10

## 2024-04-10 PROBLEM — I69.354 HEMIPLEGIA AND HEMIPARESIS FOLLOWING CEREBRAL INFARCTION AFFECTING LEFT NON-DOMINANT SIDE: Status: ACTIVE | Noted: 2024-04-10

## 2024-04-10 PROBLEM — I42.9 CARDIOMYOPATHY, UNSPECIFIED: Status: ACTIVE | Noted: 2024-04-10
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
## 2024-04-10 LAB
ALBUMIN SERPL BCP-MCNC: 2.8 G/DL (ref 3.5–5.2)
ALP SERPL-CCNC: 87 U/L (ref 55–135)
ALT SERPL W/O P-5'-P-CCNC: 9 U/L (ref 10–44)
ANION GAP SERPL CALC-SCNC: 10 MMOL/L (ref 8–16)
ANION GAP SERPL CALC-SCNC: 11 MMOL/L (ref 8–16)
AST SERPL-CCNC: 14 U/L (ref 10–40)
BACTERIA #/AREA URNS HPF: ABNORMAL /HPF
BASOPHILS # BLD AUTO: 0.02 K/UL (ref 0–0.2)
BASOPHILS NFR BLD: 0.1 % (ref 0–1.9)
BILIRUB DIRECT SERPL-MCNC: 0.5 MG/DL (ref 0.1–0.3)
BILIRUB SERPL-MCNC: 1.1 MG/DL (ref 0.1–1)
BILIRUB UR QL STRIP: NEGATIVE
BUN SERPL-MCNC: 27 MG/DL (ref 8–23)
BUN SERPL-MCNC: 28 MG/DL (ref 8–23)
CALCIUM SERPL-MCNC: 8.5 MG/DL (ref 8.7–10.5)
CALCIUM SERPL-MCNC: 8.6 MG/DL (ref 8.7–10.5)
CHLORIDE SERPL-SCNC: 109 MMOL/L (ref 95–110)
CHLORIDE SERPL-SCNC: 111 MMOL/L (ref 95–110)
CK SERPL-CCNC: 220 U/L (ref 20–200)
CK SERPL-CCNC: 283 U/L (ref 20–200)
CLARITY UR: ABNORMAL
CO2 SERPL-SCNC: 20 MMOL/L (ref 23–29)
CO2 SERPL-SCNC: 21 MMOL/L (ref 23–29)
COLOR UR: YELLOW
CREAT SERPL-MCNC: 2 MG/DL (ref 0.5–1.4)
CREAT SERPL-MCNC: 2.3 MG/DL (ref 0.5–1.4)
CREAT UR-MCNC: 424.6 MG/DL (ref 23–375)
DIFFERENTIAL METHOD BLD: ABNORMAL
EOSINOPHIL # BLD AUTO: 0 K/UL (ref 0–0.5)
EOSINOPHIL NFR BLD: 0 % (ref 0–8)
ERYTHROCYTE [DISTWIDTH] IN BLOOD BY AUTOMATED COUNT: 14.9 % (ref 11.5–14.5)
EST. GFR  (NO RACE VARIABLE): 26 ML/MIN/1.73 M^2
EST. GFR  (NO RACE VARIABLE): 31 ML/MIN/1.73 M^2
GLUCOSE SERPL-MCNC: 101 MG/DL (ref 70–110)
GLUCOSE SERPL-MCNC: 170 MG/DL (ref 70–110)
GLUCOSE UR QL STRIP: NEGATIVE
HCT VFR BLD AUTO: 36 % (ref 40–54)
HGB BLD-MCNC: 11.8 G/DL (ref 14–18)
HGB UR QL STRIP: ABNORMAL
HYALINE CASTS #/AREA URNS LPF: 36 /LPF
IMM GRANULOCYTES # BLD AUTO: 0.11 K/UL (ref 0–0.04)
IMM GRANULOCYTES NFR BLD AUTO: 0.5 % (ref 0–0.5)
KETONES UR QL STRIP: ABNORMAL
LACTATE SERPL-SCNC: 1.8 MMOL/L (ref 0.5–2.2)
LEUKOCYTE ESTERASE UR QL STRIP: ABNORMAL
LYMPHOCYTES # BLD AUTO: 0.9 K/UL (ref 1–4.8)
LYMPHOCYTES NFR BLD: 4.4 % (ref 18–48)
MAGNESIUM SERPL-MCNC: 1.8 MG/DL (ref 1.6–2.6)
MAGNESIUM SERPL-MCNC: 1.9 MG/DL (ref 1.6–2.6)
MCH RBC QN AUTO: 31.1 PG (ref 27–31)
MCHC RBC AUTO-ENTMCNC: 32.8 G/DL (ref 32–36)
MCV RBC AUTO: 95 FL (ref 82–98)
MICROSCOPIC COMMENT: ABNORMAL
MONOCYTES # BLD AUTO: 2.3 K/UL (ref 0.3–1)
MONOCYTES NFR BLD: 10.9 % (ref 4–15)
NEUTROPHILS # BLD AUTO: 17.6 K/UL (ref 1.8–7.7)
NEUTROPHILS NFR BLD: 84.1 % (ref 38–73)
NITRITE UR QL STRIP: NEGATIVE
NRBC BLD-RTO: 0 /100 WBC
OHS QRS DURATION: 86 MS
OHS QRS DURATION: 86 MS
OHS QTC CALCULATION: 415 MS
OHS QTC CALCULATION: 424 MS
OSMOLALITY UR: 492 MOSM/KG (ref 50–1200)
PH UR STRIP: 6 [PH] (ref 5–8)
PLATELET # BLD AUTO: 178 K/UL (ref 150–450)
PLATELET BLD QL SMEAR: ABNORMAL
PMV BLD AUTO: 11.2 FL (ref 9.2–12.9)
POTASSIUM SERPL-SCNC: 3.8 MMOL/L (ref 3.5–5.1)
POTASSIUM SERPL-SCNC: 3.9 MMOL/L (ref 3.5–5.1)
PROT SERPL-MCNC: 6.5 G/DL (ref 6–8.4)
PROT UR QL STRIP: ABNORMAL
PROT UR-MCNC: 602 MG/DL (ref 0–15)
PROT/CREAT UR: 1.42 MG/G{CREAT} (ref 0–0.2)
RBC # BLD AUTO: 3.8 M/UL (ref 4.6–6.2)
RBC #/AREA URNS HPF: 54 /HPF (ref 0–4)
SODIUM SERPL-SCNC: 140 MMOL/L (ref 136–145)
SODIUM SERPL-SCNC: 142 MMOL/L (ref 136–145)
SODIUM UR-SCNC: 25 MMOL/L (ref 20–250)
SP GR UR STRIP: 1.02 (ref 1–1.03)
SQUAMOUS #/AREA URNS HPF: 1 /HPF
TROPONIN I SERPL DL<=0.01 NG/ML-MCNC: 0.03 NG/ML (ref 0–0.03)
TSH SERPL DL<=0.005 MIU/L-ACNC: 1.12 UIU/ML (ref 0.4–4)
URN SPEC COLLECT METH UR: ABNORMAL
UROBILINOGEN UR STRIP-ACNC: NEGATIVE EU/DL
WBC # BLD AUTO: 20.96 K/UL (ref 3.9–12.7)
WBC #/AREA URNS HPF: >100 /HPF (ref 0–5)
WBC CLUMPS URNS QL MICRO: ABNORMAL

## 2024-04-10 PROCEDURE — 83735 ASSAY OF MAGNESIUM: CPT | Performed by: EMERGENCY MEDICINE

## 2024-04-10 PROCEDURE — 87077 CULTURE AEROBIC IDENTIFY: CPT | Performed by: EMERGENCY MEDICINE

## 2024-04-10 PROCEDURE — A4216 STERILE WATER/SALINE, 10 ML: HCPCS | Performed by: PHYSICIAN ASSISTANT

## 2024-04-10 PROCEDURE — 97162 PT EVAL MOD COMPLEX 30 MIN: CPT

## 2024-04-10 PROCEDURE — 63600175 PHARM REV CODE 636 W HCPCS: Performed by: PHYSICIAN ASSISTANT

## 2024-04-10 PROCEDURE — 82550 ASSAY OF CK (CPK): CPT | Mod: 91 | Performed by: PHYSICIAN ASSISTANT

## 2024-04-10 PROCEDURE — 94761 N-INVAS EAR/PLS OXIMETRY MLT: CPT

## 2024-04-10 PROCEDURE — 63600175 PHARM REV CODE 636 W HCPCS: Performed by: EMERGENCY MEDICINE

## 2024-04-10 PROCEDURE — 83735 ASSAY OF MAGNESIUM: CPT | Mod: 91 | Performed by: PHYSICIAN ASSISTANT

## 2024-04-10 PROCEDURE — 25000003 PHARM REV CODE 250: Performed by: EMERGENCY MEDICINE

## 2024-04-10 PROCEDURE — 82550 ASSAY OF CK (CPK): CPT | Performed by: EMERGENCY MEDICINE

## 2024-04-10 PROCEDURE — 83605 ASSAY OF LACTIC ACID: CPT | Performed by: STUDENT IN AN ORGANIZED HEALTH CARE EDUCATION/TRAINING PROGRAM

## 2024-04-10 PROCEDURE — 25000003 PHARM REV CODE 250: Performed by: PHYSICIAN ASSISTANT

## 2024-04-10 PROCEDURE — 21400001 HC TELEMETRY ROOM

## 2024-04-10 PROCEDURE — 87086 URINE CULTURE/COLONY COUNT: CPT | Performed by: EMERGENCY MEDICINE

## 2024-04-10 PROCEDURE — 81000 URINALYSIS NONAUTO W/SCOPE: CPT | Performed by: EMERGENCY MEDICINE

## 2024-04-10 PROCEDURE — 84484 ASSAY OF TROPONIN QUANT: CPT | Performed by: STUDENT IN AN ORGANIZED HEALTH CARE EDUCATION/TRAINING PROGRAM

## 2024-04-10 PROCEDURE — 97530 THERAPEUTIC ACTIVITIES: CPT

## 2024-04-10 PROCEDURE — 87088 URINE BACTERIA CULTURE: CPT | Performed by: EMERGENCY MEDICINE

## 2024-04-10 PROCEDURE — 82248 BILIRUBIN DIRECT: CPT | Performed by: EMERGENCY MEDICINE

## 2024-04-10 PROCEDURE — 94640 AIRWAY INHALATION TREATMENT: CPT

## 2024-04-10 PROCEDURE — 25000242 PHARM REV CODE 250 ALT 637 W/ HCPCS: Performed by: PHYSICIAN ASSISTANT

## 2024-04-10 PROCEDURE — 84443 ASSAY THYROID STIM HORMONE: CPT | Performed by: EMERGENCY MEDICINE

## 2024-04-10 PROCEDURE — 63600175 PHARM REV CODE 636 W HCPCS: Performed by: STUDENT IN AN ORGANIZED HEALTH CARE EDUCATION/TRAINING PROGRAM

## 2024-04-10 PROCEDURE — 96372 THER/PROPH/DIAG INJ SC/IM: CPT | Performed by: PHYSICIAN ASSISTANT

## 2024-04-10 PROCEDURE — 84156 ASSAY OF PROTEIN URINE: CPT | Performed by: EMERGENCY MEDICINE

## 2024-04-10 PROCEDURE — 84300 ASSAY OF URINE SODIUM: CPT | Performed by: EMERGENCY MEDICINE

## 2024-04-10 PROCEDURE — 85025 COMPLETE CBC W/AUTO DIFF WBC: CPT | Performed by: PHYSICIAN ASSISTANT

## 2024-04-10 PROCEDURE — 87186 SC STD MICRODIL/AGAR DIL: CPT | Performed by: EMERGENCY MEDICINE

## 2024-04-10 PROCEDURE — 80053 COMPREHEN METABOLIC PANEL: CPT | Performed by: EMERGENCY MEDICINE

## 2024-04-10 PROCEDURE — 80048 BASIC METABOLIC PNL TOTAL CA: CPT | Mod: XB | Performed by: PHYSICIAN ASSISTANT

## 2024-04-10 PROCEDURE — 83935 ASSAY OF URINE OSMOLALITY: CPT | Performed by: EMERGENCY MEDICINE

## 2024-04-10 RX ORDER — FLUTICASONE FUROATE AND VILANTEROL 100; 25 UG/1; UG/1
1 POWDER RESPIRATORY (INHALATION) DAILY
Status: DISCONTINUED | OUTPATIENT
Start: 2024-04-10 | End: 2024-04-15 | Stop reason: HOSPADM

## 2024-04-10 RX ORDER — GLUCAGON 1 MG
1 KIT INJECTION
Status: DISCONTINUED | OUTPATIENT
Start: 2024-04-10 | End: 2024-04-15 | Stop reason: HOSPADM

## 2024-04-10 RX ORDER — NALOXONE HCL 0.4 MG/ML
0.02 VIAL (ML) INJECTION
Status: DISCONTINUED | OUTPATIENT
Start: 2024-04-10 | End: 2024-04-15 | Stop reason: HOSPADM

## 2024-04-10 RX ORDER — NIFEDIPINE 30 MG/1
60 TABLET, EXTENDED RELEASE ORAL 2 TIMES DAILY
Status: DISCONTINUED | OUTPATIENT
Start: 2024-04-10 | End: 2024-04-15 | Stop reason: HOSPADM

## 2024-04-10 RX ORDER — ACETAMINOPHEN 325 MG/1
650 TABLET ORAL EVERY 6 HOURS PRN
Status: DISCONTINUED | OUTPATIENT
Start: 2024-04-10 | End: 2024-04-15 | Stop reason: HOSPADM

## 2024-04-10 RX ORDER — SODIUM CHLORIDE 9 MG/ML
INJECTION, SOLUTION INTRAVENOUS CONTINUOUS
Status: DISCONTINUED | OUTPATIENT
Start: 2024-04-10 | End: 2024-04-10

## 2024-04-10 RX ORDER — CIPROFLOXACIN 2 MG/ML
400 INJECTION, SOLUTION INTRAVENOUS
Status: COMPLETED | OUTPATIENT
Start: 2024-04-10 | End: 2024-04-10

## 2024-04-10 RX ORDER — IBUPROFEN 200 MG
16 TABLET ORAL
Status: DISCONTINUED | OUTPATIENT
Start: 2024-04-10 | End: 2024-04-15 | Stop reason: HOSPADM

## 2024-04-10 RX ORDER — NAPROXEN SODIUM 220 MG/1
81 TABLET, FILM COATED ORAL DAILY
Status: DISCONTINUED | OUTPATIENT
Start: 2024-04-10 | End: 2024-04-15 | Stop reason: HOSPADM

## 2024-04-10 RX ORDER — IBUPROFEN 200 MG
24 TABLET ORAL
Status: DISCONTINUED | OUTPATIENT
Start: 2024-04-10 | End: 2024-04-15 | Stop reason: HOSPADM

## 2024-04-10 RX ORDER — SODIUM CHLORIDE 0.9 % (FLUSH) 0.9 %
10 SYRINGE (ML) INJECTION EVERY 8 HOURS
Status: DISCONTINUED | OUTPATIENT
Start: 2024-04-10 | End: 2024-04-15 | Stop reason: HOSPADM

## 2024-04-10 RX ORDER — HEPARIN SODIUM 5000 [USP'U]/ML
5000 INJECTION, SOLUTION INTRAVENOUS; SUBCUTANEOUS EVERY 8 HOURS
Status: DISCONTINUED | OUTPATIENT
Start: 2024-04-10 | End: 2024-04-15 | Stop reason: HOSPADM

## 2024-04-10 RX ORDER — LOSARTAN POTASSIUM 50 MG/1
100 TABLET ORAL DAILY
Status: DISCONTINUED | OUTPATIENT
Start: 2024-04-10 | End: 2024-04-10

## 2024-04-10 RX ORDER — TALC
6 POWDER (GRAM) TOPICAL NIGHTLY PRN
Status: DISCONTINUED | OUTPATIENT
Start: 2024-04-10 | End: 2024-04-15 | Stop reason: HOSPADM

## 2024-04-10 RX ORDER — CARVEDILOL 6.25 MG/1
6.25 TABLET ORAL 2 TIMES DAILY
Status: DISCONTINUED | OUTPATIENT
Start: 2024-04-10 | End: 2024-04-14

## 2024-04-10 RX ORDER — AMOXICILLIN 250 MG
1 CAPSULE ORAL 2 TIMES DAILY PRN
Status: DISCONTINUED | OUTPATIENT
Start: 2024-04-10 | End: 2024-04-15 | Stop reason: HOSPADM

## 2024-04-10 RX ORDER — ATORVASTATIN CALCIUM 20 MG/1
40 TABLET, FILM COATED ORAL DAILY
Status: DISCONTINUED | OUTPATIENT
Start: 2024-04-10 | End: 2024-04-15 | Stop reason: HOSPADM

## 2024-04-10 RX ORDER — SODIUM CHLORIDE, SODIUM LACTATE, POTASSIUM CHLORIDE, CALCIUM CHLORIDE 600; 310; 30; 20 MG/100ML; MG/100ML; MG/100ML; MG/100ML
INJECTION, SOLUTION INTRAVENOUS CONTINUOUS
Status: ACTIVE | OUTPATIENT
Start: 2024-04-10 | End: 2024-04-11

## 2024-04-10 RX ORDER — CIPROFLOXACIN 2 MG/ML
400 INJECTION, SOLUTION INTRAVENOUS
Status: COMPLETED | OUTPATIENT
Start: 2024-04-10 | End: 2024-04-14

## 2024-04-10 RX ADMIN — HEPARIN SODIUM 5000 UNITS: 5000 INJECTION INTRAVENOUS; SUBCUTANEOUS at 08:04

## 2024-04-10 RX ADMIN — SODIUM CHLORIDE 1000 ML: 9 INJECTION, SOLUTION INTRAVENOUS at 12:04

## 2024-04-10 RX ADMIN — NIFEDIPINE 60 MG: 30 TABLET, FILM COATED, EXTENDED RELEASE ORAL at 09:04

## 2024-04-10 RX ADMIN — CIPROFLOXACIN 400 MG: 2 INJECTION, SOLUTION INTRAVENOUS at 12:04

## 2024-04-10 RX ADMIN — CIPROFLOXACIN 400 MG: 2 INJECTION, SOLUTION INTRAVENOUS at 01:04

## 2024-04-10 RX ADMIN — SODIUM CHLORIDE: 9 INJECTION, SOLUTION INTRAVENOUS at 06:04

## 2024-04-10 RX ADMIN — LOSARTAN POTASSIUM 100 MG: 50 TABLET, FILM COATED ORAL at 09:04

## 2024-04-10 RX ADMIN — Medication 10 ML: at 07:04

## 2024-04-10 RX ADMIN — SODIUM CHLORIDE, POTASSIUM CHLORIDE, SODIUM LACTATE AND CALCIUM CHLORIDE: 600; 310; 30; 20 INJECTION, SOLUTION INTRAVENOUS at 07:04

## 2024-04-10 RX ADMIN — Medication 10 ML: at 03:04

## 2024-04-10 RX ADMIN — SODIUM CHLORIDE, POTASSIUM CHLORIDE, SODIUM LACTATE AND CALCIUM CHLORIDE: 600; 310; 30; 20 INJECTION, SOLUTION INTRAVENOUS at 09:04

## 2024-04-10 RX ADMIN — FLUTICASONE FUROATE AND VILANTEROL TRIFENATATE 1 PUFF: 100; 25 POWDER RESPIRATORY (INHALATION) at 04:04

## 2024-04-10 RX ADMIN — NIFEDIPINE 60 MG: 30 TABLET, FILM COATED, EXTENDED RELEASE ORAL at 08:04

## 2024-04-10 RX ADMIN — HEPARIN SODIUM 5000 UNITS: 5000 INJECTION INTRAVENOUS; SUBCUTANEOUS at 03:04

## 2024-04-10 RX ADMIN — CARVEDILOL 6.25 MG: 6.25 TABLET, FILM COATED ORAL at 09:04

## 2024-04-10 RX ADMIN — ATORVASTATIN CALCIUM 40 MG: 20 TABLET, FILM COATED ORAL at 09:04

## 2024-04-10 RX ADMIN — ASPIRIN 81 MG CHEWABLE TABLET 81 MG: 81 TABLET CHEWABLE at 09:04

## 2024-04-10 RX ADMIN — FLUTICASONE FUROATE AND VILANTEROL TRIFENATATE 1 PUFF: 100; 25 POWDER RESPIRATORY (INHALATION) at 09:04

## 2024-04-10 RX ADMIN — HEPARIN SODIUM 5000 UNITS: 5000 INJECTION INTRAVENOUS; SUBCUTANEOUS at 07:04

## 2024-04-10 RX ADMIN — CARVEDILOL 6.25 MG: 6.25 TABLET, FILM COATED ORAL at 08:04

## 2024-04-10 NOTE — HPI
Mr. Dwayne Saravia is a 89 y.o. male, with PMH of HTN, COPD, CKD3b, GERD, antalgic gait & dysarthria & right hemiparesis s/p cerebral infarction, who presented to [facility] ED via EMS on 4/9/24 via EMS after his wife found him on the living room floor. It was unclear if he slid off of the cough or slipped and fell. His wife reported that he was in his normal state of health earlier today when his wife returned from work she found him on the ground at 5 pm, having fallen down. His wife and daughter assisted him up onto his walker. Once sitting, he seemed confused at home. He was alert earlier in the day, and was alert and oriented in the ED. He began to become confused once he arrived to the ED, he began to be confused, asking his daughter to help him up to a sitting position on his rolling scooter. After he was sitting, he appeared dazed and as if he was starting into the distance. He was eluated in the ED with labs showing leukocytosis of 22K, with left sfhit. H&H were normal at 13.7/40.7. He was treated in the ED with 1L NS and a dose of ciprofloxacin. He was placed on observation.

## 2024-04-10 NOTE — H&P
[facility] Medicine  History & Physical    Patient Name: Dwayne Saravia  MRN: [id number]  Patient Class: OP- Observation  Admission Date: 4/9/2024  Attending Physician: Julio Ramirez MD   Primary Care Provider: Frieda Galvan MD         Patient information was obtained from patient, past medical records, and ER records.     Subjective:     Principal Problem:Sepsis    Chief Complaint:   Chief Complaint   Patient presents with    Fall     Pt presents via [facility] after wife found pt on the floor. Pt unsure if he slid off the couch or had a slip and fall. Pt has right sided weakness and aphasia from previous CVA.         HPI: Mr. Dwayne Saravia is a 89 y.o. male, with PMH of HTN, COPD, CKD3b, GERD, antalgic gait & dysarthria & right hemiparesis s/p cerebral infarction, who presented to [facility] ED via EMS on 4/9/24 via EMS after his wife found him on the living room floor. It was unclear if he slid off of the cough or slipped and fell. His wife reported that he was in his normal state of health earlier today when his wife returned from work she found him on the ground at 5 pm, having fallen down. His wife and daughter assisted him up onto his walker. Once sitting, he seemed confused at home. He was alert earlier in the day, and was alert and oriented in the ED. He began to become confused once he arrived to the ED, he began to be confused, asking his daughter to help him up to a sitting position on his rolling scooter. After he was sitting, he appeared dazed and as if he was starting into the distance. He was eluated in the ED with labs showing leukocytosis of 22K, with left sfhit. H&H were normal at 13.7/40.7. He was treated in the ED with 1L NS and a dose of ciprofloxacin. He was placed on observation.     Past Medical History:   Diagnosis Date    Arthritis     BPH (benign prostatic hyperplasia)     CVA (cerebral vascular accident) 2007    Hypertension        Past Surgical History:    Procedure Laterality Date    PROSTATE SURGERY             Review of patient's allergies indicates:   Allergen Reactions    Keflex [cephalexin]        No current facility-administered medications on file prior to encounter.     Current Outpatient Medications on File Prior to Encounter   Medication Sig    aspirin 81 MG Chew Take 81 mg by mouth once daily.    atorvastatin (LIPITOR) 40 MG tablet Take 40 mg by mouth once daily.    carvediloL (COREG) 6.25 MG tablet Take 6.25 mg by mouth 2 (two) times daily.    docusate sodium (COLACE) 100 MG capsule Take 1 capsule (100 mg total) by mouth Daily.    fluticasone furoate-vilanteroL (BREO ELLIPTA) 100-25 mcg/dose diskus inhaler Inhale 1 puff into the lungs once daily. Controller    hydroCHLOROthiazide (HYDRODIURIL) 12.5 MG Tab Take 1 tablet (12.5 mg total) by mouth once daily.    ipratropium-albuteroL (COMBIVENT)  mcg/actuation inhaler Inhale 1 puff into the lungs every 6 (six) hours as needed for Wheezing or Shortness of Breath. Rescue    levocetirizine (XYZAL) 5 MG tablet Take 5 mg by mouth every evening.    losartan (COZAAR) 100 MG tablet Take 100 mg by mouth.    NIFEdipine (PROCARDIA-XL) 60 MG (OSM) 24 hr tablet Take 1 tablet (60 mg total) by mouth 2 (two) times a day.    omega-3 acid ethyl esters (LOVAZA) 1 gram capsule Take 2 g by mouth.    RSVPreF3 antigen-AS01E, PF, (AREXVY, PF,) 120 mcg/0.5 mL SusR vaccine Inject into the muscle.    simvastatin (ZOCOR) 40 MG tablet TAKE 1 TABLET EVERY DAY    vitamin D (VITAMIN D3) 1000 units Tab Take 1,000 Units by mouth.     Family History       Problem Relation (Age of Onset)    Cancer Brother    Hypertension Brother          Tobacco Use    Smoking status: Former     Current packs/day: 0.00     Types: Cigarettes     Start date: 1950     Quit date: 2018     Years since quittin.2     Passive exposure: Past    Smokeless tobacco: Never   Substance and Sexual Activity    Alcohol use: No    Drug use: No    Sexual  activity: Not Currently     Review of Systems   Constitutional:  Negative for appetite change, chills, diaphoresis and fever.   Respiratory:  Negative for cough, shortness of breath and wheezing.    Cardiovascular:  Negative for chest pain and palpitations.   Gastrointestinal:  Negative for abdominal pain, constipation, diarrhea, nausea and vomiting.   Genitourinary:  Negative for decreased urine volume, difficulty urinating, dysuria, frequency, hematuria and urgency.   Musculoskeletal:  Negative for arthralgias, back pain, myalgias, neck pain and neck stiffness.   Skin:  Negative for color change, rash and wound.   Neurological:  Negative for dizziness, syncope, weakness, light-headedness and headaches.   Hematological:  Does not bruise/bleed easily.   Psychiatric/Behavioral:  Negative for agitation and confusion.      Objective:     Vital Signs (Most Recent):  Temp: 99.4 °F (37.4 °C) (04/10/24 0029)  Pulse: 76 (04/10/24 0029)  Resp: 18 (04/10/24 0029)  BP: (!) 152/70 (04/10/24 0330)  SpO2: (!) 93 % (04/10/24 0029) Vital Signs (24h Range):  Temp:  [99.1 °F (37.3 °C)-99.4 °F (37.4 °C)] 99.4 °F (37.4 °C)  Pulse:  [76-91] 76  Resp:  [12-18] 18  SpO2:  [93 %-97 %] 93 %  BP: (124-154)/(70-74) 152/70        There is no height or weight on file to calculate BMI.     Physical Exam  Vitals and nursing note reviewed.   Constitutional:       General: He is not in acute distress.     Appearance: He is well-developed and normal weight. He is not ill-appearing, toxic-appearing or diaphoretic.      Comments: Elderly and chronically ill appearing male.    HENT:      Head: Normocephalic and atraumatic.      Right Ear: External ear normal.      Left Ear: External ear normal.   Eyes:      General: No scleral icterus.        Right eye: No discharge.         Left eye: No discharge.      Conjunctiva/sclera: Conjunctivae normal.   Neck:      Vascular: No JVD.      Trachea: No tracheal deviation.   Cardiovascular:      Rate and Rhythm:  "Normal rate and regular rhythm.      Heart sounds: Normal heart sounds. No murmur heard.     No gallop.   Pulmonary:      Effort: Pulmonary effort is normal. No respiratory distress.      Breath sounds: Normal breath sounds. No stridor. No wheezing or rales.   Abdominal:      General: Bowel sounds are normal. There is no distension.      Palpations: Abdomen is soft. There is no mass.      Tenderness: There is no abdominal tenderness. There is no guarding.   Musculoskeletal:         General: No deformity. Normal range of motion.      Cervical back: Normal range of motion and neck supple.   Skin:     General: Skin is warm and dry.   Neurological:      General: No focal deficit present.      Mental Status: He is alert and oriented to person, place, and time.      Cranial Nerves: No cranial nerve deficit.      Motor: No abnormal muscle tone.      Coordination: Coordination normal.   Psychiatric:         Mood and Affect: Mood normal.         Behavior: Behavior normal.         Thought Content: Thought content normal.         Judgment: Judgment normal.                Significant Labs: All pertinent labs within the past 24 hours have been reviewed.  BMP:   Recent Labs   Lab 04/10/24  0003   *      K 3.9      CO2 20*   BUN 27*   CREATININE 2.3*   CALCIUM 8.6*   MG 1.8     CBC:   Recent Labs   Lab 04/09/24  2257   WBC 22.92*   HGB 13.4*   HCT 40.7        CMP:   Recent Labs   Lab 04/10/24  0003      K 3.9      CO2 20*   *   BUN 27*   CREATININE 2.3*   CALCIUM 8.6*   PROT 6.5   ALBUMIN 2.8*   BILITOT 1.1*   ALKPHOS 87   AST 14   ALT 9*   ANIONGAP 11     Urine Culture: No results for input(s): "LABURIN" in the last 48 hours.  Urine Studies:   Recent Labs   Lab 04/10/24  0018   COLORU Yellow   APPEARANCEUA Hazy*   PHUR 6.0   SPECGRAV 1.025   PROTEINUA 3+*   GLUCUA Negative   KETONESU Trace*   BILIRUBINUA Negative   OCCULTUA 3+*   NITRITE Negative   UROBILINOGEN Negative   LEUKOCYTESUR " 3+*   RBCUA 54*   WBCUA >100*   BACTERIA Many*   SQUAMEPITHEL 1   HYALINECASTS 36*       Significant Imaging: I have reviewed all pertinent imaging results/findings within the past 24 hours.  Imaging Results              CT Head Without Contrast (Final result)  Result time 04/10/24 01:55:10      Final result by Radha Perez MD (04/10/24 01:55:10)                   Impression:      No acute territorial infarct.  Age-indeterminate bilateral basal ganglia and bilateral thalamic infarcts.    Chronic sinus disease.      Electronically signed by: Radha Perez  Date:    04/10/2024  Time:    01:55               Narrative:    EXAMINATION:  CT OF THE HEAD WITHOUT    CLINICAL HISTORY:  Mental status change, unknown cause;    TECHNIQUE:  5 mm unenhanced axial images were obtained from the skull base to the vertex.    COMPARISON:  MRI brain 03/01/2015 and CT head 02/28/2015    FINDINGS:  Moderate cerebral atrophy and chronic small vessel ischemic changes are present.  Chronic small vessel ischemic changes are seen involving the barbra.  There are age-indeterminate infarcts involving bilateral basal ganglia and bilateral thalami.  The previous MRI of the brain and CT of the head are not available for comparison at this time.  However, infarcts were reported.  There is no acute intracranial hemorrhage, territorial infarct or mass effect, or midline shift. In the visualized paranasal sinuses and mastoid air cells, there is mild mucoperiosteal thickening in the bilateral maxillary and left sphenoid sinuses.  Mucous retention cysts or polyps are seen in bilateral ethmoid air cells.                                       X-Ray Hips Bilateral 2 View Incl AP Pelvis (Final result)  Result time 04/10/24 00:20:49      Final result by Guadalupe Pairsh MD (04/10/24 00:20:49)                   Impression:      No evidence of acute abnormality.    Left hip increased degenerative changes with hip joint space narrowing and sclerosis  raising question of AVN.      Electronically signed by: Guadalupe Parish  Date:    04/10/2024  Time:    00:20               Narrative:    EXAMINATION:  XR HIPS BILATERAL 2 VIEW INCL AP PELVIS    CLINICAL HISTORY:  Unspecified fall, initial encounter    TECHNIQUE:  AP view of the pelvis and frogleg lateral views of both hips were performed.    COMPARISON:  10/29/2022 right hip views    FINDINGS:  There is left hip joint space narrowing and subchondral cyst formation.  There is development of a cystic lucency in the left femoral head with sclerotic margins is and increased femoral head sclerosis possibly representing AVN.  No acute fracture or dislocation bilaterally is noted.  Mild degenerative changes of the right hip is.  Degenerative changes of the visualized lumbar spine.                                       X-Ray Chest AP Portable (Final result)  Result time 04/09/24 23:34:09      Final result by Radha Perez MD (04/09/24 23:34:09)                   Impression:      No acute intrathoracic abnormality detected.  Mild cardiomegaly.      Electronically signed by: Radha Perez  Date:    04/09/2024  Time:    23:34               Narrative:    EXAMINATION:  AP PORTABLE CHEST    CLINICAL HISTORY:  fall;    TECHNIQUE:  AP portable chest radiograph was submitted.    COMPARISON:  10/23/2023 and CTA chest 10/23/2023    FINDINGS:  AP portable chest radiograph demonstrates mild enlargement of the cardiac silhouette.  There is no focal consolidation, pneumothorax, or pleural effusion. The increased density in the right paratracheal region is stable and is related to innominate artery                                       Assessment/Plan:     * Sepsis  - Mr. Dwayne Saravia presents after being found down by his family at home   This patient does have evidence of infective focus  My overall impression is sepsis.  Source: Urinary Tract  Antibiotics given-   Antibiotics (72h ago, onward)      Start     Stop Route  "Frequency Ordered    04/10/24 1300  ciprofloxacin (CIPRO)400mg/200ml D5W IVPB 400 mg         -- IV Every 12 hours (non-standard times) 04/10/24 0607          Latest lactate reviewed-  No results for input(s): "LACTATE", "POCLAC" in the last 72 hours.  Organ dysfunction indicated by Acute kidney injury    Fluid challenge Not needed - patient is not hypotensive      Post- resuscitation assessment Yes Perfusion exam was performed within 6 hours of septic shock presentation after bolus shows Adequate tissue perfusion assessed by non-invasive monitoring       Source control achieved by: Ciprofloxacin     Acute renal failure superimposed on stage 3 chronic kidney disease  - suspect 2/2 dehydration   - urine lytes pending   - begin hydration with  cc/hour   Patient with acute kidney injury/acute renal failure likely due to pre-renal azotemia due to dehydration RICCI is currently stable. Baseline creatinine appears to be about 1.8 on most recent labs - Labs reviewed- Renal function/electrolytes with CrCl cannot be calculated (Unknown ideal weight.). according to latest data. Monitor urine output and serial BMP and adjust therapy as needed. Avoid nephrotoxins and renally dose meds for GFR listed above.    Acute cystitis  - see "Sepsis"       Essential hypertension  Chronic, controlled. Latest blood pressure and vitals reviewed-     Temp:  [99.1 °F (37.3 °C)-99.4 °F (37.4 °C)]   Pulse:  [76-91]   Resp:  [12-18]   BP: (124-154)/(70-74)   SpO2:  [93 %-97 %] .   Home meds for hypertension were reviewed and noted below.   Hypertension Medications               carvediloL (COREG) 6.25 MG tablet Take 6.25 mg by mouth 2 (two) times daily.    hydroCHLOROthiazide (HYDRODIURIL) 12.5 MG Tab Take 1 tablet (12.5 mg total) by mouth once daily.    losartan (COZAAR) 100 MG tablet Take 100 mg by mouth.    NIFEdipine (PROCARDIA-XL) 60 MG (OSM) 24 hr tablet Take 1 tablet (60 mg total) by mouth 2 (two) times a day.            While in the " hospital, will manage blood pressure as follows; Continue home antihypertensive regimen    Will utilize p.r.n. blood pressure medication only if patient's blood pressure greater than 180/110 and he develops symptoms such as worsening chest pain or shortness of breath.    Other emphysema  - Patient's COPD is controlled currently.  Patient is currently off COPD Pathway. Continue scheduled inhalers Supplemental oxygen and monitor respiratory status closely.     Dysarthria following cerebral infarction  - aspiration precautions       Gastroesophageal reflux disease without esophagitis        H/O: CVA (cerebrovascular accident)  - history noted  - fall precautions   - aspiration precautions   - continue statin, ASA        VTE Risk Mitigation (From admission, onward)           Ordered     heparin (porcine) injection 5,000 Units  Every 8 hours         04/10/24 0226     IP VTE HIGH RISK PATIENT  Once         04/10/24 0226     Place sequential compression device  Until discontinued         04/10/24 0226                         On 04/10/2024, patient should be placed in hospital observation services under the care of Dr. Julio Ramirez MD.           Abigail Lugo PA-C  Department of Hospital Medicine  [facility] - Emergency Dept

## 2024-04-10 NOTE — NURSING
1600  Patient received in room 315. A O x4. Vitals stable in room air. Urinal provided. Helped patient call his wife. Patient made oriented to his room and call light.

## 2024-04-10 NOTE — ASSESSMENT & PLAN NOTE
- continue home carvedilol and nifedipine  - initially started on losartan but we will hold losartan and hydrochlorothiazide in light of RICCI

## 2024-04-10 NOTE — PT/OT/SLP EVAL
"Physical Therapy Evaluation and Treatment    Patient Name:  Dwayne Saravia   MRN:  [id number]    Recommendations:     Discharge Recommendations:  (TBD)   Discharge Equipment Recommendations:  (TBD)   Barriers to discharge: Inaccessible home    Assessment:     Dwayne Saravia is a 89 y.o. male admitted with a medical diagnosis of Sepsis - admit after fall and found on ground (slipped when sitting on couch per pt). Pmhx pertinent for HTN, COPD, CKD, cerebral infarct with R hemiparesis and dysarthria ((B) basal ganglia and thalamic infarcts), R DENNY.   He presents with the following impairments/functional limitations: impaired self care skills, impaired functional mobility, gait instability, impaired balance, decreased upper extremity function, decreased lower extremity function, decreased safety awareness, abnormal tone, impaired coordination, impaired fine motor.    Patient evaluated by PT and goals established. Patient with grossly maintained strength however poor balance requiring hands on assistance for standing and gait with RW. Patient unable to maintain balance without BUE support with posterior LOB when attempted. PT will continue to follow and progress as tolerated. Rec for dc to TBD pending OT evaluation - potential need for CONNER 2/2 poor balance and impaired ADL performance.     Rehab Prognosis: Fair; patient would benefit from acute skilled PT services to address these deficits and reach maximum level of function.    Recent Surgery: * No surgery found *      Plan:     During this hospitalization, patient to be seen 5 x/week to address the identified rehab impairments via gait training, therapeutic activities, therapeutic exercises, neuromuscular re-education and progress toward the following goals:    Plan of Care Expires:  05/10/24    Subjective     Chief Complaint: Needing to use restroom (found soiled with urine)  Patient/Family Comments/goals: Goal to stay strong, comments the bed "saps [his] strength"; " Patient agreeable to evaluation.  Pain/Comfort:  Pain Rating 1: 0/10  Pain Rating Post-Intervention 1: 0/10    Patients cultural, spiritual, Yazidism conflicts given the current situation: no    Living Environment:  Pt lives with family in a single story home with 6 steps to enter and B handrail(s).  Possible WC lift to front door   Upon discharge, patient will have assistance from family, unclear if available .  Prior level of function:  Ambulation: Mod(I) with rollator for household mobility  Falls: Denies frequent falls   Equipment used at home: rollator.      Objective:     Communicated with RN prior to session.  Patient found HOB elevated with peripheral IV, telemetry, blood pressure cuff, pulse ox (continuous)  upon PT entry to room.    General Precautions: Standard, fall, aphasia  Orthopedic Precautions:N/A   Braces: N/A  Respiratory Status: Room air    Patient donned non slip socks and gait belt for OOB mobility.    Exams:  Cognition:   Patient is oriented to person, , month/year, place, general situation.  Pt follows approximately 100% of one sep commands.    Mood: Pleasant and cooperative.  Safety Awareness: Impaired  Musculoskeletal:  Posture:  Forward head, rounded shoulders, posterior lean with standing  LE ROM/Strength:   R ROM: WFL  L ROM: WFL  R Strength:   Knee extension: 4/5  Dorsiflexion: 4/5   L Strength:   Knee extension: 4/5  Dorsiflexion: 4/5   Neuromuscular:  Tone/Reflexes: Did not formally assess, appears to have some rigidity of limbs R>L  Coordination: Impaired  Balance:   Static sitting: Fair  Static standing: Poor  Dynamic standing: Poor  Visual-vestibular: No impairments identified with functional mobility. No formal testing performed.  Integument: Visible skin intact  Cardiopulmonary:  Edema: None noted    Functional Mobility:  Bed Mobility:     Supine to Sit: moderate assistance  Sit to Supine: minimum assistance  Transfers:     Sit to Stand:  minimum assistance and moderate  assistance with rolling walker  3x from EOB  Gait: x4 ft forwards and backwards with RW, Hira with forward gait and modA for backward gait.   Maintains forward flexion of trunk .   Difficulty coordinating backwards step with RW management requiring increased assistance to prevent posterior LOB.   Balance:   Static sitting EOB x5 min with SBA and slight posterior lean.   Static standing 2x2 min with BUE support and modA progressing to SBA.   Significant posterior lean with initial standing and with any attempt to decrease UE support, pt able to correct with cueing and with BUE support on RW.       AM-PAC 6 CLICK MOBILITY  Total Score:17       Treatment & Education:  Assistance for standing for nicola hygiene after found soiled with urine, nursing staff present to assist with changing linens    Patient left HOB elevated with all lines intact, call button in reach, and OT notified.    GOALS:   Multidisciplinary Problems       Physical Therapy Goals          Problem: Physical Therapy    Goal Priority Disciplines Outcome Goal Variances Interventions   Physical Therapy Goal     PT, PT/OT Ongoing, Progressing     Description: Goals to be met by: 5/10/24    Patient will increase functional independence with mobility by performin. Supine<>sit with supervision without use of HB features.   2. Sit<>stand with supervision with rollator.  3. Gait x 50 feet with supervision with rollator.  4. Ascend/descend 4 step(s) with least restrictive assistive device and uni HR with CGA.                        History:     Past Medical History:   Diagnosis Date    Arthritis     BPH (benign prostatic hyperplasia)     CVA (cerebral vascular accident)     Hypertension        Past Surgical History:   Procedure Laterality Date    PROSTATE SURGERY             Time Tracking:     PT Received On: 04/10/24  PT Start Time: 1137     PT Stop Time: 1200  PT Total Time (min): 23 min     Billable Minutes: Evaluation 15 and Therapeutic Activity  8      04/10/2024

## 2024-04-10 NOTE — ASSESSMENT & PLAN NOTE
Suspect 2/2 dehydration  (less likely CK, only elevated to the 200's)     Plan -   - urine lytes pending   - cont IVF   - Monitor urine output and BMP

## 2024-04-10 NOTE — ED NOTES
Pt awake and alert; resting quietly on stretcher.  Pt remains on continuous cardiac and pulse ox monitoring with non-invasive blood pressure to cycle every 30 minutes.  VS stable. Pt denies pain at this time; no acute distress or discomfort reported or observed. Bed locked in lowest position; side rails up and locked x 2; call light, bedside table, and personal belongings within reach. Room assessed for safety measures and cleanliness; no action needed at this time. Plan of care discussed.  Pt instructed to alert nurse for assistance and before attempting to get out of bed; verbalizes understanding. Pt denies needs or complaints at this time; monitoring ongoing.

## 2024-04-10 NOTE — PLAN OF CARE
Inpatient Upgrade Note    Dwayne Saravia has warranted treatment spanning two or more midnights of hospital level care for the management of  This patient does have evidence of infective focus . He continues to require IV antibiotics, IV fluids, daily labs, monitoring of vital signs, and further evaluation by consultants. His condition is also complicated by the following comorbidities: Hypertension. , CKD, hx of stroke with deficits

## 2024-04-10 NOTE — ASSESSMENT & PLAN NOTE
- suspect 2/2 dehydration   - urine lytes pending   - begin hydration with  cc/hour   Patient with acute kidney injury/acute renal failure likely due to pre-renal azotemia due to dehydration RICCI is currently stable. Baseline creatinine appears to be about 1.8 on most recent labs - Labs reviewed- Renal function/electrolytes with CrCl cannot be calculated (Unknown ideal weight.). according to latest data. Monitor urine output and serial BMP and adjust therapy as needed. Avoid nephrotoxins and renally dose meds for GFR listed above.

## 2024-04-10 NOTE — ASSESSMENT & PLAN NOTE
- Patient's COPD is controlled currently.  Patient is currently off COPD Pathway. Continue scheduled inhalers Supplemental oxygen and monitor respiratory status closely.

## 2024-04-10 NOTE — ED PROVIDER NOTES
Encounter Date: 2024       History     Chief Complaint   Patient presents with    Fall     Pt presents [facility] after wife found pt on the floor. Pt unsure if he slid off the couch or had a slip and fall. Pt has right sided weakness and aphasia from previous CVA.      89 yo male with prior CVA with R-sided weakness, HTN, BPH, presents via EMS from home to [facility] s/p fall with AMS.  Family reports the patient was in his usual state of health earlier today.  Around 5:00 p.m. today 24, wife returned home from work to find patient seated on the floor in the den, having fallen.  Patient was awake and alert, and asked his wife to help him up.  Wife and daughter assisted patient up and had him sit on his walker.  At that point, patient became somewhat confused.  He appeared dazed and was staring off.  Family called EMS this evening.  EMS reports patient A/O x 4, GCS 15.  Family reports patient has been fatigued this week.      To me, patient denies pain anywhere.  He denies shortness of breath or cough.  He denies dysuria.      Family:  Wife Charley Saravia [phone]  Daughter Ibis Harmon [phone]  Son Juarez Saravia [phone]      Review of patient's allergies indicates:   Allergen Reactions    Keflex [cephalexin]      Past Medical History:   Diagnosis Date    Arthritis     BPH (benign prostatic hyperplasia)     CVA (cerebral vascular accident)     Hypertension      Past Surgical History:   Procedure Laterality Date    PROSTATE SURGERY      2016     Family History   Problem Relation Age of Onset    Hypertension Brother     Cancer Brother      Social History     Tobacco Use    Smoking status: Former     Current packs/day: 0.00     Types: Cigarettes     Start date: 1950     Quit date: 2018     Years since quittin.2     Passive exposure: Past    Smokeless tobacco: Never   Substance Use Topics    Alcohol use: No    Drug use: No     Review of Systems    Constitutional:  Positive for fatigue.   HENT:  Negative for sore throat.    Eyes:  Negative for pain.   Respiratory:  Negative for cough and shortness of breath.    Cardiovascular:  Negative for chest pain.   Gastrointestinal:  Negative for abdominal pain.   Genitourinary:  Negative for dysuria.   Musculoskeletal:  Positive for gait problem (uses walker at baseline).   Skin:  Negative for rash.   Neurological:  Negative for headaches.   Psychiatric/Behavioral:  Positive for confusion.        Physical Exam     Initial Vitals   BP Pulse Resp Temp SpO2   04/09/24 2234 04/09/24 2234 04/09/24 2234 04/09/24 2238 04/09/24 2234   124/74 91 12 99.1 °F (37.3 °C) 97 %      MAP       --                Physical Exam    Nursing note and vitals reviewed.  Constitutional: He appears well-developed and well-nourished. He is not diaphoretic.   Awake, alert, nontoxic, speaking in complete sentences.    HENT:   Head: Normocephalic and atraumatic.   Mouth/Throat: Oropharynx is clear and moist.   Eyes: Conjunctivae and EOM are normal. Pupils are equal, round, and reactive to light.   Neck: Neck supple.   Normal range of motion.  Cardiovascular:  Normal rate, regular rhythm and intact distal pulses.           Pulmonary/Chest: Breath sounds normal. No respiratory distress. He has no wheezes. He has no rhonchi. He has no rales.   Abdominal: Abdomen is soft. There is no abdominal tenderness.   Musculoskeletal:         General: No tenderness or edema. Normal range of motion.      Cervical back: Normal range of motion and neck supple.     Neurological: He is alert and oriented to person, place, and time.   Moving all extremities.    Skin: Skin is warm and dry.   Psychiatric: He has a normal mood and affect.         ED Course   Procedures  Labs Reviewed   CBC W/ AUTO DIFFERENTIAL - Abnormal; Notable for the following components:       Result Value    WBC 22.92 (*)     RBC 4.28 (*)     Hemoglobin 13.4 (*)     MCH 31.3 (*)     RDW 15.1 (*)      Immature Granulocytes 0.8 (*)     Gran # (ANC) 19.2 (*)     Immature Grans (Abs) 0.18 (*)     Lymph # 0.9 (*)     Mono # 2.6 (*)     Gran % 83.7 (*)     Lymph % 4.1 (*)     All other components within normal limits   B-TYPE NATRIURETIC PEPTIDE - Abnormal; Notable for the following components:     (*)     All other components within normal limits   TROPONIN I - Abnormal; Notable for the following components:    Troponin I 0.037 (*)     All other components within normal limits   URINALYSIS, REFLEX TO URINE CULTURE - Abnormal; Notable for the following components:    Appearance, UA Hazy (*)     Protein, UA 3+ (*)     Ketones, UA Trace (*)     Occult Blood UA 3+ (*)     Leukocytes, UA 3+ (*)     All other components within normal limits    Narrative:     Specimen Source->Urine   COMPREHENSIVE METABOLIC PANEL - Abnormal; Notable for the following components:    CO2 20 (*)     Glucose 170 (*)     BUN 27 (*)     Creatinine 2.3 (*)     Calcium 8.6 (*)     Albumin 2.8 (*)     Total Bilirubin 1.1 (*)     ALT 9 (*)     eGFR 26 (*)     All other components within normal limits   URINALYSIS MICROSCOPIC - Abnormal; Notable for the following components:    RBC, UA 54 (*)     WBC, UA >100 (*)     WBC Clumps, UA Many (*)     Bacteria Many (*)     Hyaline Casts, UA 36 (*)     All other components within normal limits    Narrative:     Specimen Source->Urine   CK - Abnormal; Notable for the following components:     (*)     All other components within normal limits   BILIRUBIN, DIRECT - Abnormal; Notable for the following components:    Bilirubin, Direct 0.5 (*)     All other components within normal limits   CULTURE, URINE   CK   MAGNESIUM   TSH   TSH   MAGNESIUM   BILIRUBIN, DIRECT   BASIC METABOLIC PANEL   MAGNESIUM   CBC W/ AUTO DIFFERENTIAL   CK   SODIUM, URINE, RANDOM   OSMOLALITY, URINE RANDOM   PROTEIN / CREATININE RATIO, URINE   CREATININE, URINE, RANDOM   TROPONIN I   LACTIC ACID, PLASMA     EKG Readings:  (Independently Interpreted)   23:10: NSR, HR 78. Normal axis. No ectopy. TWI in V3-V5, questionable ST depression II, aVF. No STEMI. Similar to 10/23/23.       Imaging Results              CT Head Without Contrast (Final result)  Result time 04/10/24 01:55:10      Final result by Radha Perez MD (04/10/24 01:55:10)                   Impression:      No acute territorial infarct.  Age-indeterminate bilateral basal ganglia and bilateral thalamic infarcts.    Chronic sinus disease.      Electronically signed by: Radha Perez  Date:    04/10/2024  Time:    01:55               Narrative:    EXAMINATION:  CT OF THE HEAD WITHOUT    CLINICAL HISTORY:  Mental status change, unknown cause;    TECHNIQUE:  5 mm unenhanced axial images were obtained from the skull base to the vertex.    COMPARISON:  MRI brain 03/01/2015 and CT head 02/28/2015    FINDINGS:  Moderate cerebral atrophy and chronic small vessel ischemic changes are present.  Chronic small vessel ischemic changes are seen involving the barbra.  There are age-indeterminate infarcts involving bilateral basal ganglia and bilateral thalami.  The previous MRI of the brain and CT of the head are not available for comparison at this time.  However, infarcts were reported.  There is no acute intracranial hemorrhage, territorial infarct or mass effect, or midline shift. In the visualized paranasal sinuses and mastoid air cells, there is mild mucoperiosteal thickening in the bilateral maxillary and left sphenoid sinuses.  Mucous retention cysts or polyps are seen in bilateral ethmoid air cells.                                       X-Ray Hips Bilateral 2 View Incl AP Pelvis (Final result)  Result time 04/10/24 00:20:49      Final result by Guadalupe Parish MD (04/10/24 00:20:49)                   Impression:      No evidence of acute abnormality.    Left hip increased degenerative changes with hip joint space narrowing and sclerosis raising question of  AVN.      Electronically signed by: Guadalupe Parish  Date:    04/10/2024  Time:    00:20               Narrative:    EXAMINATION:  XR HIPS BILATERAL 2 VIEW INCL AP PELVIS    CLINICAL HISTORY:  Unspecified fall, initial encounter    TECHNIQUE:  AP view of the pelvis and frogleg lateral views of both hips were performed.    COMPARISON:  10/29/2022 right hip views    FINDINGS:  There is left hip joint space narrowing and subchondral cyst formation.  There is development of a cystic lucency in the left femoral head with sclerotic margins is and increased femoral head sclerosis possibly representing AVN.  No acute fracture or dislocation bilaterally is noted.  Mild degenerative changes of the right hip is.  Degenerative changes of the visualized lumbar spine.                                       X-Ray Chest AP Portable (Final result)  Result time 04/09/24 23:34:09      Final result by Radha Perez MD (04/09/24 23:34:09)                   Impression:      No acute intrathoracic abnormality detected.  Mild cardiomegaly.      Electronically signed by: Radha Perez  Date:    04/09/2024  Time:    23:34               Narrative:    EXAMINATION:  AP PORTABLE CHEST    CLINICAL HISTORY:  fall;    TECHNIQUE:  AP portable chest radiograph was submitted.    COMPARISON:  10/23/2023 and CTA chest 10/23/2023    FINDINGS:  AP portable chest radiograph demonstrates mild enlargement of the cardiac silhouette.  There is no focal consolidation, pneumothorax, or pleural effusion. 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                                       Medications   sodium chloride 0.9% flush 10 mL (has no administration in time range)   melatonin tablet 6 mg (has no administration in time range)   senna-docusate 8.6-50 mg per tablet 1 tablet (has no administration in time range)   acetaminophen tablet 650 mg (has no administration in time range)   naloxone 0.4 mg/mL  injection 0.02 mg (has no administration in time range)   glucose chewable tablet 16 g (has no administration in time range)   glucose chewable tablet 24 g (has no administration in time range)   glucagon (human recombinant) injection 1 mg (has no administration in time range)   heparin (porcine) injection 5,000 Units (has no administration in time range)   dextrose 10% bolus 125 mL 125 mL (has no administration in time range)   dextrose 10% bolus 250 mL 250 mL (has no administration in time range)   ciprofloxacin (CIPRO)400mg/200ml D5W IVPB 400 mg (has no administration in time range)   lactated ringers infusion (has no administration in time range)   sodium chloride 0.9% bolus 1,000 mL 1,000 mL (0 mLs Intravenous Stopped 4/10/24 0656)   ciprofloxacin (CIPRO)400mg/200ml D5W IVPB 400 mg (0 mg Intravenous Stopped 4/10/24 0656)     Medical Decision Making  89 yo male with weakness, confusion, fall.     Ddx broad including CVA/TIA, RICCI, occult infection (pneumonia, UTI, other), other.    EKG no STEMI.    CXR NAD.  Pelvis/bilat hip XR NAD.    Labs:   WBC 22.92, L shift, no bands.  BUN 27 / creatinine 2.3, eGFR 26, from BUN 6 / creatinine 1.4, eGFR 48 on 10/29/23.  , not in rhabdomyolysis.  Troponin 0.037, likely related to RICCI rather than ACS.  , near-normal.  UA 3+ leuks, no nitrites, trace ketones. Significant bacteria on micro.    CT head NAD.    Patient received IV NS 1L and IV ciprofloxacin 400mg (reported allergic to keflex so I avoided cephalosporins).  He remained awake and alert during his ER stay.    I suspect UTI causing confusion and dehydration with RICCI.  Patient requires hospital stay for hydration for RICCI as well as IV antibiotics for UTI.      I discussed patient for OBS with JEREMI Lugo of hospital medicine.  I updated patient and family as to diagnosis and plan.      Amount and/or Complexity of Data Reviewed  Labs: ordered.  Radiology: ordered.  ECG/medicine tests:  ordered.    Risk  Decision regarding hospitalization.                                      Clinical Impression:  Final diagnoses:  [R53.83] Fatigue  [W19.XXXA] Fall  [N17.9] Acute kidney injury  [N30.01] Acute cystitis with hematuria  [E86.0] Dehydration  [R41.82] Altered mental status, unspecified altered mental status type (Primary)  [R53.83] Fatigue, unspecified type  [D72.829] Leukocytosis, unspecified type          ED Disposition Condition    Observation Stable                Laurie Nash MD  04/10/24 0707

## 2024-04-10 NOTE — ASSESSMENT & PLAN NOTE
Dysarthria following cerebral infarction     Plan -   - fall precautions   - aspiration precautions   - continue statin, ASA

## 2024-04-10 NOTE — ASSESSMENT & PLAN NOTE
Chronic, controlled. Latest blood pressure and vitals reviewed-     Temp:  [99.1 °F (37.3 °C)-99.4 °F (37.4 °C)]   Pulse:  [76-91]   Resp:  [12-18]   BP: (124-154)/(70-74)   SpO2:  [93 %-97 %] .   Home meds for hypertension were reviewed and noted below.   Hypertension Medications               carvediloL (COREG) 6.25 MG tablet Take 6.25 mg by mouth 2 (two) times daily.    hydroCHLOROthiazide (HYDRODIURIL) 12.5 MG Tab Take 1 tablet (12.5 mg total) by mouth once daily.    losartan (COZAAR) 100 MG tablet Take 100 mg by mouth.    NIFEdipine (PROCARDIA-XL) 60 MG (OSM) 24 hr tablet Take 1 tablet (60 mg total) by mouth 2 (two) times a day.            While in the hospital, will manage blood pressure as follows; Continue home antihypertensive regimen    Will utilize p.r.n. blood pressure medication only if patient's blood pressure greater than 180/110 and he develops symptoms such as worsening chest pain or shortness of breath.

## 2024-04-10 NOTE — ASSESSMENT & PLAN NOTE
2/2 infection vs antalgic gait from previous stroke    CT brain with remote strokes   C spine not done in ED but on exam but has full ROM and no cervical neck pain. No FND other than dysarthria which is chronic from previous stroke.     Trop mildly elevated most likely in setting of RICCI and down trending. Denies chest pain.     CK mildly elevated     Plan  - PT/OT  - Monitor CK  - IVF

## 2024-04-10 NOTE — ASSESSMENT & PLAN NOTE
"- Mr. Dwayne Saravia presents after being found down by his family at home   This patient does have evidence of infective focus  My overall impression is sepsis.  Source: Urinary Tract  Antibiotics given-   Antibiotics (72h ago, onward)      Start     Stop Route Frequency Ordered    04/10/24 1300  ciprofloxacin (CIPRO)400mg/200ml D5W IVPB 400 mg         -- IV Every 12 hours (non-standard times) 04/10/24 0607          Latest lactate reviewed-  No results for input(s): "LACTATE", "POCLAC" in the last 72 hours.  Organ dysfunction indicated by Acute kidney injury    Fluid challenge Not needed - patient is not hypotensive      Post- resuscitation assessment Yes Perfusion exam was performed within 6 hours of septic shock presentation after bolus shows Adequate tissue perfusion assessed by non-invasive monitoring       Source control achieved by: Ciprofloxacin   "

## 2024-04-10 NOTE — PLAN OF CARE
MOON Message    Medicare Outpatient and Observation Notification regarding financial responsibilityGiven to patient/caregiver; Explained to patient/caregiver; Signed/date by patient/caregiverDate LOZANO was signed4/10/2024Time LOZANO was [id number]

## 2024-04-10 NOTE — SUBJECTIVE & OBJECTIVE
Past Medical History:   Diagnosis Date    Arthritis     BPH (benign prostatic hyperplasia)     CVA (cerebral vascular accident) 2007    Hypertension        Past Surgical History:   Procedure Laterality Date    PROSTATE SURGERY      2016       Review of patient's allergies indicates:   Allergen Reactions    Keflex [cephalexin]        No current facility-administered medications on file prior to encounter.     Current Outpatient Medications on File Prior to Encounter   Medication Sig    aspirin 81 MG Chew Take 81 mg by mouth once daily.    atorvastatin (LIPITOR) 40 MG tablet Take 40 mg by mouth once daily.    carvediloL (COREG) 6.25 MG tablet Take 6.25 mg by mouth 2 (two) times daily.    docusate sodium (COLACE) 100 MG capsule Take 1 capsule (100 mg total) by mouth Daily.    fluticasone furoate-vilanteroL (BREO ELLIPTA) 100-25 mcg/dose diskus inhaler Inhale 1 puff into the lungs once daily. Controller    hydroCHLOROthiazide (HYDRODIURIL) 12.5 MG Tab Take 1 tablet (12.5 mg total) by mouth once daily.    ipratropium-albuteroL (COMBIVENT)  mcg/actuation inhaler Inhale 1 puff into the lungs every 6 (six) hours as needed for Wheezing or Shortness of Breath. Rescue    levocetirizine (XYZAL) 5 MG tablet Take 5 mg by mouth every evening.    losartan (COZAAR) 100 MG tablet Take 100 mg by mouth.    NIFEdipine (PROCARDIA-XL) 60 MG (OSM) 24 hr tablet Take 1 tablet (60 mg total) by mouth 2 (two) times a day.    omega-3 acid ethyl esters (LOVAZA) 1 gram capsule Take 2 g by mouth.    RSVPreF3 antigen-AS01E, PF, (AREXVY, PF,) 120 mcg/0.5 mL SusR vaccine Inject into the muscle.    simvastatin (ZOCOR) 40 MG tablet TAKE 1 TABLET EVERY DAY    vitamin D (VITAMIN D3) 1000 units Tab Take 1,000 Units by mouth.     Family History       Problem Relation (Age of Onset)    Cancer Brother    Hypertension Brother          Tobacco Use    Smoking status: Former     Current packs/day: 0.00     Types: Cigarettes     Start date: 1/28/1950      Quit date: 2018     Years since quittin.2     Passive exposure: Past    Smokeless tobacco: Never   Substance and Sexual Activity    Alcohol use: No    Drug use: No    Sexual activity: Not Currently     Review of Systems   Constitutional:  Negative for appetite change, chills, diaphoresis and fever.   Respiratory:  Negative for cough, shortness of breath and wheezing.    Cardiovascular:  Negative for chest pain and palpitations.   Gastrointestinal:  Negative for abdominal pain, constipation, diarrhea, nausea and vomiting.   Genitourinary:  Negative for decreased urine volume, difficulty urinating, dysuria, frequency, hematuria and urgency.   Musculoskeletal:  Negative for arthralgias, back pain, myalgias, neck pain and neck stiffness.   Skin:  Negative for color change, rash and wound.   Neurological:  Negative for dizziness, syncope, weakness, light-headedness and headaches.   Hematological:  Does not bruise/bleed easily.   Psychiatric/Behavioral:  Negative for agitation and confusion.      Objective:     Vital Signs (Most Recent):  Temp: 99.4 °F (37.4 °C) (04/10/24 0029)  Pulse: 76 (04/10/24 002)  Resp: 18 (04/10/24 002)  BP: (!) 152/70 (04/10/24 0330)  SpO2: (!) 93 % (04/10/24 002) Vital Signs (24h Range):  Temp:  [99.1 °F (37.3 °C)-99.4 °F (37.4 °C)] 99.4 °F (37.4 °C)  Pulse:  [76-91] 76  Resp:  [12-18] 18  SpO2:  [93 %-97 %] 93 %  BP: (124-154)/(70-74) 152/70        There is no height or weight on file to calculate BMI.     Physical Exam  Vitals and nursing note reviewed.   Constitutional:       General: He is not in acute distress.     Appearance: He is well-developed and normal weight. He is not ill-appearing, toxic-appearing or diaphoretic.      Comments: Elderly and chronically ill appearing male.    HENT:      Head: Normocephalic and atraumatic.      Right Ear: External ear normal.      Left Ear: External ear normal.   Eyes:      General: No scleral icterus.        Right eye: No discharge.        "  Left eye: No discharge.      Conjunctiva/sclera: Conjunctivae normal.   Neck:      Vascular: No JVD.      Trachea: No tracheal deviation.   Cardiovascular:      Rate and Rhythm: Normal rate and regular rhythm.      Heart sounds: Normal heart sounds. No murmur heard.     No gallop.   Pulmonary:      Effort: Pulmonary effort is normal. No respiratory distress.      Breath sounds: Normal breath sounds. No stridor. No wheezing or rales.   Abdominal:      General: Bowel sounds are normal. There is no distension.      Palpations: Abdomen is soft. There is no mass.      Tenderness: There is no abdominal tenderness. There is no guarding.   Musculoskeletal:         General: No deformity. Normal range of motion.      Cervical back: Normal range of motion and neck supple.   Skin:     General: Skin is warm and dry.   Neurological:      General: No focal deficit present.      Mental Status: He is alert and oriented to person, place, and time.      Cranial Nerves: No cranial nerve deficit.      Motor: No abnormal muscle tone.      Coordination: Coordination normal.   Psychiatric:         Mood and Affect: Mood normal.         Behavior: Behavior normal.         Thought Content: Thought content normal.         Judgment: Judgment normal.                Significant Labs: All pertinent labs within the past 24 hours have been reviewed.  BMP:   Recent Labs   Lab 04/10/24  0003   *      K 3.9      CO2 20*   BUN 27*   CREATININE 2.3*   CALCIUM 8.6*   MG 1.8     CBC:   Recent Labs   Lab 04/09/24  2257   WBC 22.92*   HGB 13.4*   HCT 40.7        CMP:   Recent Labs   Lab 04/10/24  0003      K 3.9      CO2 20*   *   BUN 27*   CREATININE 2.3*   CALCIUM 8.6*   PROT 6.5   ALBUMIN 2.8*   BILITOT 1.1*   ALKPHOS 87   AST 14   ALT 9*   ANIONGAP 11     Urine Culture: No results for input(s): "LABURIN" in the last 48 hours.  Urine Studies:   Recent Labs   Lab 04/10/24  0018   COLORU Yellow   APPEARANCEUA " Hazy*   PHUR 6.0   SPECGRAV 1.025   PROTEINUA 3+*   GLUCUA Negative   KETONESU Trace*   BILIRUBINUA Negative   OCCULTUA 3+*   NITRITE Negative   UROBILINOGEN Negative   LEUKOCYTESUR 3+*   RBCUA 54*   WBCUA >100*   BACTERIA Many*   SQUAMEPITHEL 1   HYALINECASTS 36*       Significant Imaging: I have reviewed all pertinent imaging results/findings within the past 24 hours.  Imaging Results              CT Head Without Contrast (Final result)  Result time 04/10/24 01:55:10      Final result by Radha Perez MD (04/10/24 01:55:10)                   Impression:      No acute territorial infarct.  Age-indeterminate bilateral basal ganglia and bilateral thalamic infarcts.    Chronic sinus disease.      Electronically signed by: Radha Perez  Date:    04/10/2024  Time:    01:55               Narrative:    EXAMINATION:  CT OF THE HEAD WITHOUT    CLINICAL HISTORY:  Mental status change, unknown cause;    TECHNIQUE:  5 mm unenhanced axial images were obtained from the skull base to the vertex.    COMPARISON:  MRI brain 03/01/2015 and CT head 02/28/2015    FINDINGS:  Moderate cerebral atrophy and chronic small vessel ischemic changes are present.  Chronic small vessel ischemic changes are seen involving the barbra.  There are age-indeterminate infarcts involving bilateral basal ganglia and bilateral thalami.  The previous MRI of the brain and CT of the head are not available for comparison at this time.  However, infarcts were reported.  There is no acute intracranial hemorrhage, territorial infarct or mass effect, or midline shift. In the visualized paranasal sinuses and mastoid air cells, there is mild mucoperiosteal thickening in the bilateral maxillary and left sphenoid sinuses.  Mucous retention cysts or polyps are seen in bilateral ethmoid air cells.                                       X-Ray Hips Bilateral 2 View Incl AP Pelvis (Final result)  Result time 04/10/24 00:20:49      Final result by Guadalupe Parish  MD MARCY (04/10/24 00:20:49)                   Impression:      No evidence of acute abnormality.    Left hip increased degenerative changes with hip joint space narrowing and sclerosis raising question of AVN.      Electronically signed by: Guadalupe Parish  Date:    04/10/2024  Time:    00:20               Narrative:    EXAMINATION:  XR HIPS BILATERAL 2 VIEW INCL AP PELVIS    CLINICAL HISTORY:  Unspecified fall, initial encounter    TECHNIQUE:  AP view of the pelvis and frogleg lateral views of both hips were performed.    COMPARISON:  10/29/2022 right hip views    FINDINGS:  There is left hip joint space narrowing and subchondral cyst formation.  There is development of a cystic lucency in the left femoral head with sclerotic margins is and increased femoral head sclerosis possibly representing AVN.  No acute fracture or dislocation bilaterally is noted.  Mild degenerative changes of the right hip is.  Degenerative changes of the visualized lumbar spine.                                       X-Ray Chest AP Portable (Final result)  Result time 04/09/24 23:34:09      Final result by Radha Perez MD (04/09/24 23:34:09)                   Impression:      No acute intrathoracic abnormality detected.  Mild cardiomegaly.      Electronically signed by: Radha Perez  Date:    04/09/2024  Time:    23:34               Narrative:    EXAMINATION:  AP PORTABLE CHEST    CLINICAL HISTORY:  fall;    TECHNIQUE:  AP portable chest radiograph was submitted.    COMPARISON:  10/23/2023 and CTA chest 10/23/2023    FINDINGS:  AP portable chest radiograph demonstrates mild enlargement of the cardiac silhouette.  There is no focal consolidation, pneumothorax, or pleural effusion. 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

## 2024-04-10 NOTE — ASSESSMENT & PLAN NOTE
- Mr. Dwayne Saravia presents after being found down by his family at home   This patient does have evidence of infective focus  My overall impression is sepsis.  Source: Urinary Tract  Antibiotics given-   Antibiotics (72h ago, onward)    Start     Stop Route Frequency Ordered    04/10/24 1300  ciprofloxacin (CIPRO)400mg/200ml D5W IVPB 400 mg         -- IV Every 12 hours (non-standard times) 04/10/24 0607        Latest lactate reviewed-  Recent Labs   Lab 04/10/24  0719   LACTATE 1.8     Organ dysfunction indicated by Acute kidney injury    Fluid challenge Not needed - patient is not hypotensive      Post- resuscitation assessment Yes Perfusion exam was performed within 6 hours of septic shock presentation after bolus shows Adequate tissue perfusion assessed by non-invasive monitoring       Source control achieved by: Ciprofloxacin

## 2024-04-10 NOTE — PROGRESS NOTES
[facility], [place], operated by [facility]  [facility] Medicine  Progress Note    Patient Name: Dwayne Saravia  MRN: [id number]  Patient Class: OP- Observation   Admission Date: 4/9/2024  Length of Stay: 0 days  Attending Physician: Julio Ramirez MD  Primary Care Provider: Frieda Galvan MD        Subjective:     Principal Problem:Fall        HPI:  Mr. Dwayne Saravia is a 89 y.o. male, with PMH of HTN, COPD, CKD3b, GERD, antalgic gait & dysarthria & right hemiparesis s/p cerebral infarction, who [place] [facility] [place] on 4/9/24 via EMS after his wife found him on the living room floor. It was unclear if he slid off of the cough or slipped and fell. His wife reported that he was in his normal state of health earlier today when his wife returned from work she found him on the ground at 5 pm, having fallen down. His wife and daughter assisted him up onto his walker. Once sitting, he seemed confused at home. He was alert earlier in the day, and was alert and oriented in the ED. He began to become confused once he arrived to the ED, he began to be confused, asking his daughter to help him up to a sitting position on his rolling scooter. After he was sitting, he appeared dazed and as if he was starting into the distance. He was eluated in the ED with labs showing leukocytosis of 22K, with left sfhit. H&H were normal at 13.7/40.7. He was treated in the ED with 1L NS and a dose of ciprofloxacin. He was placed on observation.     Overview/Hospital Course:  RICCI improving. CK only midly elevated but monitoring and hydrating with IV fluids. Can stop fluids tomrorow.  Initial trop elevated but down trending. WBC still elevated with mild downtrend. Cont IV abx while awaiting urine cultures. Awaiting PT/OT. C spine not done in ED but on exam but has full ROM and no cervical neck pain. No FND other than dysarthria which is chronic from previous stroke.     Interval History: Pt with no complaints. Denies dysuria. A little tough to  understand with some things due to dysarthria but does not appear to be confused. Updated wife via phone.     Review of Systems   Constitutional:  Negative for appetite change, chills, diaphoresis and fever.   Respiratory:  Negative for cough, shortness of breath and wheezing.    Cardiovascular:  Negative for chest pain and palpitations.   Gastrointestinal:  Negative for abdominal pain, constipation, diarrhea, nausea and vomiting.   Genitourinary:  Negative for decreased urine volume, difficulty urinating, dysuria, frequency, hematuria and urgency.   Neurological:  Negative for dizziness, syncope, weakness, light-headedness and headaches.   Hematological:  Does not bruise/bleed easily.   Psychiatric/Behavioral:  Negative for agitation and confusion.      Objective:     Vital Signs (Most Recent):  Temp: 99.4 °F (37.4 °C) (04/10/24 0029)  Pulse: 64 (04/10/24 1101)  Resp: 17 (04/10/24 0906)  BP: (!) 146/73 (04/10/24 1201)  SpO2: (!) 94 % (04/10/24 1101) Vital Signs (24h Range):  Temp:  [99.1 °F (37.3 °C)-99.4 °F (37.4 °C)] 99.4 °F (37.4 °C)  Pulse:  [64-91] 64  Resp:  [12-18] 17  SpO2:  [91 %-100 %] 94 %  BP: (124-158)/(63-74) 146/73        There is no height or weight on file to calculate BMI.  No intake or output data in the 24 hours ending 04/10/24 1409      Physical Exam  Constitutional:       General: He is not in acute distress.  Pulmonary:      Effort: No respiratory distress.      Breath sounds: No wheezing.   Abdominal:      General: There is no distension.      Tenderness: There is no abdominal tenderness.   Musculoskeletal:      Right lower leg: No edema.      Left lower leg: No edema.   Neurological:      Mental Status: He is oriented to person, place, and time.      Comments: No FND other than dysarthria              Significant Labs: All pertinent labs within the past 24 hours have been reviewed.    Significant Imaging: I have reviewed all pertinent imaging results/findings within the past 24  hours.    Assessment/Plan:      * Fall  2/2 infection vs antalgic gait from previous stroke    CT brain with remote strokes   C spine not done in ED but on exam but has full ROM and no cervical neck pain. No FND other than dysarthria which is chronic from previous stroke.     Trop mildly elevated most likely in setting of RICCI and down trending. Denies chest pain.     CK mildly elevated     Plan  - PT/OT  - Monitor CK  - IVF           Acute cystitis  Leukocytosis     UA pos for 3+ leukocytes  Pt denies sx of dysurea    Plan  - Urine cx  - Started on cipro in ED - continue     Acute renal failure superimposed on stage 3 chronic kidney disease  Suspect 2/2 dehydration  (less likely CK, only elevated to the 200's)     Plan -   - urine lytes pending   - cont IVF   - Monitor urine output and BMP     Essential hypertension  - continue home carvedilol and nifedipine  - initially started on losartan but we will hold losartan and hydrochlorothiazide in light of RICCI    Other emphysema  Does not appear to be in exacerbation  Continue daily steroid inhaler  DuoNebs p.r.n.        H/O: CVA (cerebrovascular accident)  Dysarthria following cerebral infarction     Plan -   - fall precautions   - aspiration precautions   - continue statin, ASA        VTE Risk Mitigation (From admission, onward)           Ordered     heparin (porcine) injection 5,000 Units  Every 8 hours         04/10/24 0226     IP VTE HIGH RISK PATIENT  Once         04/10/24 0226     Place sequential compression device  Until discontinued         04/10/24 0226                    Discharge Planning   HAILY:      Code Status: Full Code   Is the patient medically ready for discharge?:     Reason for patient still in hospital (select all that apply): Treatment                     Julio Fletcher MD  Department of Hospital Medicine   [facility] - Emergency Dept

## 2024-04-10 NOTE — HOSPITAL COURSE
C spine not done in ED but on exam but has full ROM and no cervical neck pain. No FND other than dysarthria which is chronic from previous stroke.Continued IV fluids and IV antibiotics.  CK only midly elevated but monitored and down trended.  RICCI and leukocytosis resolved.  Urine cultures growing pansensitive E coli and patient completed 5 day course of ciprofloxacin. Seen by PT/OT - [facility].  Pt stable for discharge to facility.  Hydrochlorothiazide stopped to decrease chance of dehydration and carvedilol dose increased.

## 2024-04-10 NOTE — PLAN OF CARE
Problem: Physical Therapy  Goal: Physical Therapy Goal  Description: Goals to be met by: 5/10/24    Patient will increase functional independence with mobility by performin. Supine<>sit with supervision without use of HB features.   2. Sit<>stand with supervision with rollator.  3. Gait x 50 feet with supervision with rollator.  4. Ascend/descend 4 step(s) with least restrictive assistive device and uni HR with CGA.   Outcome: Ongoing, Progressing     Patient evaluated by PT and goals established. Patient with grossly maintained strength however poor balance requiring hands on assistance for standing and gait with RW. Patient unable to maintain balance without BUE support with posterior LOB when attempted. PT will continue to follow and progress as tolerated. Rec for dc to TBD pending OT evaluation. Please see progress note for detailed plan of care and recommendations.

## 2024-04-10 NOTE — ASSESSMENT & PLAN NOTE
Leukocytosis     UA pos for 3+ leukocytes  Pt denies sx of dysurea    Plan  - Urine cx  - Started on cipro in ED - continue

## 2024-04-10 NOTE — SUBJECTIVE & OBJECTIVE
Interval History: Pt with no complaints. Denies dysuria. A little tough to understand with some things due to dysarthria but does not appear to be confused.     Review of Systems   Constitutional:  Negative for appetite change, chills, diaphoresis and fever.   Respiratory:  Negative for cough, shortness of breath and wheezing.    Cardiovascular:  Negative for chest pain and palpitations.   Gastrointestinal:  Negative for abdominal pain, constipation, diarrhea, nausea and vomiting.   Genitourinary:  Negative for decreased urine volume, difficulty urinating, dysuria, frequency, hematuria and urgency.   Neurological:  Negative for dizziness, syncope, weakness, light-headedness and headaches.   Hematological:  Does not bruise/bleed easily.   Psychiatric/Behavioral:  Negative for agitation and confusion.      Objective:     Vital Signs (Most Recent):  Temp: 99.4 °F (37.4 °C) (04/10/24 0029)  Pulse: 64 (04/10/24 1101)  Resp: 17 (04/10/24 0906)  BP: (!) 146/73 (04/10/24 1201)  SpO2: (!) 94 % (04/10/24 1101) Vital Signs (24h Range):  Temp:  [99.1 °F (37.3 °C)-99.4 °F (37.4 °C)] 99.4 °F (37.4 °C)  Pulse:  [64-91] 64  Resp:  [12-18] 17  SpO2:  [91 %-100 %] 94 %  BP: (124-158)/(63-74) 146/73        There is no height or weight on file to calculate BMI.  No intake or output data in the 24 hours ending 04/10/24 1409      Physical Exam  Constitutional:       General: He is not in acute distress.  Pulmonary:      Effort: No respiratory distress.      Breath sounds: No wheezing.   Abdominal:      General: There is no distension.      Tenderness: There is no abdominal tenderness.   Musculoskeletal:      Right lower leg: No edema.      Left lower leg: No edema.   Neurological:      Mental Status: He is oriented to person, place, and time.      Comments: No FND other than dysarthria              Significant Labs: All pertinent labs within the past 24 hours have been reviewed.    Significant Imaging: I have reviewed all pertinent  imaging results/findings within the past 24 hours.

## 2024-04-11 LAB
ANION GAP SERPL CALC-SCNC: 8 MMOL/L (ref 8–16)
BUN SERPL-MCNC: 24 MG/DL (ref 8–23)
CALCIUM SERPL-MCNC: 8.5 MG/DL (ref 8.7–10.5)
CHLORIDE SERPL-SCNC: 111 MMOL/L (ref 95–110)
CK SERPL-CCNC: 261 U/L (ref 20–200)
CO2 SERPL-SCNC: 21 MMOL/L (ref 23–29)
CREAT SERPL-MCNC: 1.7 MG/DL (ref 0.5–1.4)
ERYTHROCYTE [DISTWIDTH] IN BLOOD BY AUTOMATED COUNT: 14.6 % (ref 11.5–14.5)
EST. GFR  (NO RACE VARIABLE): 38 ML/MIN/1.73 M^2
GLUCOSE SERPL-MCNC: 97 MG/DL (ref 70–110)
HCT VFR BLD AUTO: 35.9 % (ref 40–54)
HGB BLD-MCNC: 11.7 G/DL (ref 14–18)
MAGNESIUM SERPL-MCNC: 1.9 MG/DL (ref 1.6–2.6)
MCH RBC QN AUTO: 31.2 PG (ref 27–31)
MCHC RBC AUTO-ENTMCNC: 32.6 G/DL (ref 32–36)
MCV RBC AUTO: 96 FL (ref 82–98)
PHOSPHATE SERPL-MCNC: 2.3 MG/DL (ref 2.7–4.5)
PLATELET # BLD AUTO: 176 K/UL (ref 150–450)
PMV BLD AUTO: 11.3 FL (ref 9.2–12.9)
POTASSIUM SERPL-SCNC: 3.9 MMOL/L (ref 3.5–5.1)
RBC # BLD AUTO: 3.75 M/UL (ref 4.6–6.2)
SODIUM SERPL-SCNC: 140 MMOL/L (ref 136–145)
WBC # BLD AUTO: 15.06 K/UL (ref 3.9–12.7)

## 2024-04-11 PROCEDURE — A4216 STERILE WATER/SALINE, 10 ML: HCPCS | Performed by: PHYSICIAN ASSISTANT

## 2024-04-11 PROCEDURE — 80048 BASIC METABOLIC PNL TOTAL CA: CPT | Performed by: STUDENT IN AN ORGANIZED HEALTH CARE EDUCATION/TRAINING PROGRAM

## 2024-04-11 PROCEDURE — 94761 N-INVAS EAR/PLS OXIMETRY MLT: CPT

## 2024-04-11 PROCEDURE — 25000003 PHARM REV CODE 250: Performed by: PHYSICIAN ASSISTANT

## 2024-04-11 PROCEDURE — 97110 THERAPEUTIC EXERCISES: CPT

## 2024-04-11 PROCEDURE — 21400001 HC TELEMETRY ROOM

## 2024-04-11 PROCEDURE — 83735 ASSAY OF MAGNESIUM: CPT | Performed by: STUDENT IN AN ORGANIZED HEALTH CARE EDUCATION/TRAINING PROGRAM

## 2024-04-11 PROCEDURE — 82550 ASSAY OF CK (CPK): CPT | Performed by: STUDENT IN AN ORGANIZED HEALTH CARE EDUCATION/TRAINING PROGRAM

## 2024-04-11 PROCEDURE — 97535 SELF CARE MNGMENT TRAINING: CPT

## 2024-04-11 PROCEDURE — 94640 AIRWAY INHALATION TREATMENT: CPT

## 2024-04-11 PROCEDURE — 84100 ASSAY OF PHOSPHORUS: CPT | Performed by: STUDENT IN AN ORGANIZED HEALTH CARE EDUCATION/TRAINING PROGRAM

## 2024-04-11 PROCEDURE — 85027 COMPLETE CBC AUTOMATED: CPT | Performed by: STUDENT IN AN ORGANIZED HEALTH CARE EDUCATION/TRAINING PROGRAM

## 2024-04-11 PROCEDURE — 97116 GAIT TRAINING THERAPY: CPT

## 2024-04-11 PROCEDURE — 97166 OT EVAL MOD COMPLEX 45 MIN: CPT

## 2024-04-11 PROCEDURE — 63600175 PHARM REV CODE 636 W HCPCS: Performed by: STUDENT IN AN ORGANIZED HEALTH CARE EDUCATION/TRAINING PROGRAM

## 2024-04-11 PROCEDURE — 25000003 PHARM REV CODE 250: Performed by: STUDENT IN AN ORGANIZED HEALTH CARE EDUCATION/TRAINING PROGRAM

## 2024-04-11 PROCEDURE — 36415 COLL VENOUS BLD VENIPUNCTURE: CPT | Performed by: STUDENT IN AN ORGANIZED HEALTH CARE EDUCATION/TRAINING PROGRAM

## 2024-04-11 PROCEDURE — 63600175 PHARM REV CODE 636 W HCPCS: Performed by: PHYSICIAN ASSISTANT

## 2024-04-11 RX ADMIN — POTASSIUM PHOSPHATE, MONOBASIC AND POTASSIUM PHOSPHATE, DIBASIC 15 MMOL: 224; 236 INJECTION, SOLUTION, CONCENTRATE INTRAVENOUS at 08:04

## 2024-04-11 RX ADMIN — FLUTICASONE FUROATE AND VILANTEROL TRIFENATATE 1 PUFF: 100; 25 POWDER RESPIRATORY (INHALATION) at 07:04

## 2024-04-11 RX ADMIN — CARVEDILOL 6.25 MG: 6.25 TABLET, FILM COATED ORAL at 08:04

## 2024-04-11 RX ADMIN — Medication 10 ML: at 09:04

## 2024-04-11 RX ADMIN — ATORVASTATIN CALCIUM 40 MG: 20 TABLET, FILM COATED ORAL at 08:04

## 2024-04-11 RX ADMIN — NIFEDIPINE 60 MG: 30 TABLET, FILM COATED, EXTENDED RELEASE ORAL at 08:04

## 2024-04-11 RX ADMIN — CIPROFLOXACIN 400 MG: 2 INJECTION, SOLUTION INTRAVENOUS at 01:04

## 2024-04-11 RX ADMIN — HEPARIN SODIUM 5000 UNITS: 5000 INJECTION INTRAVENOUS; SUBCUTANEOUS at 05:04

## 2024-04-11 RX ADMIN — ASPIRIN 81 MG CHEWABLE TABLET 81 MG: 81 TABLET CHEWABLE at 08:04

## 2024-04-11 RX ADMIN — HEPARIN SODIUM 5000 UNITS: 5000 INJECTION INTRAVENOUS; SUBCUTANEOUS at 03:04

## 2024-04-11 RX ADMIN — Medication 10 ML: at 03:04

## 2024-04-11 RX ADMIN — HEPARIN SODIUM 5000 UNITS: 5000 INJECTION INTRAVENOUS; SUBCUTANEOUS at 09:04

## 2024-04-11 RX ADMIN — SENNOSIDES AND DOCUSATE SODIUM 1 TABLET: 8.6; 5 TABLET ORAL at 10:04

## 2024-04-11 RX ADMIN — SODIUM CHLORIDE, POTASSIUM CHLORIDE, SODIUM LACTATE AND CALCIUM CHLORIDE: 600; 310; 30; 20 INJECTION, SOLUTION INTRAVENOUS at 03:04

## 2024-04-11 RX ADMIN — SODIUM CHLORIDE, POTASSIUM CHLORIDE, SODIUM LACTATE AND CALCIUM CHLORIDE 250 ML: 600; 310; 30; 20 INJECTION, SOLUTION INTRAVENOUS at 09:04

## 2024-04-11 NOTE — PLAN OF CARE
Problem: Occupational Therapy  Goal: Occupational Therapy Goal  Description: Goals to be met by: 4/27/24     Patient will increase functional independence with ADLs by performing:    Grooming standing at sink at SBA.  Toileting at SBA.   Toilet/BSC transfer at SBA.  UB dressing at Set up/SBA (excluding ties/buttons).  Donning underwear/pants at Set up/SBA using reacher as needed.  Donning socks at Set up/SBA using AE as needed.  Sponge bathing at Min A.    Outcome: Ongoing, Progressing  Recommend Moderate Intensity Therapy.

## 2024-04-11 NOTE — ASSESSMENT & PLAN NOTE
Leukocytosis - improving     UA pos for 3+ leukocytes  Pt denies sx of dysurea    Plan  - Urine cx - gram neg rods   - Started on cipro in ED - continue

## 2024-04-11 NOTE — PROGRESS NOTES
[facility] Medicine  Progress Note    Patient Name: Dwayne Saravia  MRN: [id number]  Patient Class: IP- Inpatient   Admission Date: 4/9/2024  Length of Stay: 1 days  Attending Physician: Julio Ramirez MD  Primary Care Provider: Frieda Galvan MD        Subjective:     Principal Problem:Fall        HPI:  Mr. Dwayne Saravia is a 89 y.o. male, with PMH of HTN, COPD, CKD3b, GERD, antalgic gait & dysarthria & right hemiparesis s/p cerebral infarction, who [place] [facility] [place] 4[place]9/24 via EMS after his wife found him on the living room floor. It was unclear if he slid off of the cough or slipped and fell. His wife reported that he was in his normal state of health earlier today when his wife returned from work she found him on the ground at 5 pm, having fallen down. His wife and daughter assisted him up onto his walker. Once sitting, he seemed confused at home. He was alert earlier in the day, and was alert and oriented in the ED. He began to become confused once he arrived to the ED, he began to be confused, asking his daughter to help him up to a sitting position on his rolling scooter. After he was sitting, he appeared dazed and as if he was starting into the distance. He was eluated in the ED with labs showing leukocytosis of 22K, with left sfhit. H&H were normal at 13.7/40.7. He was treated in the ED with 1L NS and a dose of ciprofloxacin. He was placed on observation.     Overview/Hospital Course:  RICCI improving. CK only midly elevated but monitoring and hydrating with IV fluids.  Initial trop elevated but down trending. WBC still elevated with mild downtrend. Cont IV abx while awaiting urine cultures - gram neg rods. Awaiting PT/O - CONNER. C spine not done in ED but on exam but has full ROM and no cervical neck pain. No FND other than dysarthria which is chronic from previous stroke.     Interval History: Pt doing well. Looking forward to leaving hospital. Explained plan  of care.     Review of Systems   Constitutional:  Negative for appetite change, chills, diaphoresis and fever.   Respiratory:  Negative for cough, shortness of breath and wheezing.    Cardiovascular:  Negative for chest pain and palpitations.   Gastrointestinal:  Negative for abdominal pain, constipation, diarrhea, nausea and vomiting.   Genitourinary:  Negative for decreased urine volume, difficulty urinating, dysuria, frequency, hematuria and urgency.   Neurological:  Negative for dizziness, syncope, weakness, light-headedness and headaches.   Hematological:  Does not bruise/bleed easily.   Psychiatric/Behavioral:  Negative for agitation and confusion.      Objective:     Vital Signs (Most Recent):  Temp: 98.3 °F (36.8 °C) (04/11/24 1557)  Pulse: 65 (04/11/24 1557)  Resp: 18 (04/11/24 1557)  BP: 128/73 (04/11/24 1557)  SpO2: (!) 92 % (04/11/24 1557) Vital Signs (24h Range):  Temp:  [98.3 °F (36.8 °C)-99.1 °F (37.3 °C)] 98.3 °F (36.8 °C)  Pulse:  [63-87] 65  Resp:  [16-19] 18  SpO2:  [87 %-95 %] 92 %  BP: (127-154)/(69-84) 128/73     Weight: 71.1 kg (156 lb 12 oz)  Body mass index is 23.83 kg/m².    Intake/Output Summary (Last 24 hours) at 4/11/2024 1639  Last data filed at 4/11/2024 1300  Gross per 24 hour   Intake 600 ml   Output 820 ml   Net -220 ml         Physical Exam  Constitutional:       General: He is not in acute distress.  Pulmonary:      Effort: No respiratory distress.      Breath sounds: No wheezing.   Abdominal:      General: There is no distension.      Tenderness: There is no abdominal tenderness.   Musculoskeletal:      Right lower leg: No edema.      Left lower leg: No edema.   Neurological:      Mental Status: He is oriented to person, place, and time.      Comments: No FND other than dysarthria              Significant Labs: All pertinent labs within the past 24 hours have been reviewed.    Significant Imaging: I have reviewed all pertinent imaging results/findings within the past 24  hours.    Assessment/Plan:      * Fall  2/2 infection vs antalgic gait from previous stroke    CT brain with remote strokes   C spine not done in ED but on exam but has full ROM and no cervical neck pain. No FND other than dysarthria which is chronic from previous stroke.     Trop mildly elevated most likely in setting of RICCI and down trending. Denies chest pain.     CK mildly elevated     Plan  - PT/OT- CONNER   - Monitor CK - improving    - IVF - stopped after 48 hours            Acute cystitis  Leukocytosis - improving     UA pos for 3+ leukocytes  Pt denies sx of dysurea    Plan  - Urine cx - gram neg rods   - Started on cipro in ED - continue     Acute renal failure superimposed on stage 3 chronic kidney disease  Suspect 2/2 dehydration  (less likely CK, only elevated to the 200's) - improving     Plan -   - cont IVF - stopped after 48 hours   - Monitor urine output and BMP     Essential hypertension  - continue home carvedilol and nifedipine  - initially started on losartan but we will hold losartan and hydrochlorothiazide in light of RICCI    Other emphysema  Does not appear to be in exacerbation  Continue daily steroid inhaler  DuoNebs p.r.n.    Gastroesophageal reflux disease without esophagitis        H/O: CVA (cerebrovascular accident)  Dysarthria following cerebral infarction     Plan -   - fall precautions   - aspiration precautions   - continue statin, ASA        VTE Risk Mitigation (From admission, onward)           Ordered     heparin (porcine) injection 5,000 Units  Every 8 hours         04/10/24 0226     IP VTE HIGH RISK PATIENT  Once         04/10/24 0226     Place sequential compression device  Until discontinued         04/10/24 0226                    Discharge Planning   HAILY:      Code Status: Full Code   Is the patient medically ready for discharge?:     Reason for patient still in hospital (select all that apply): Treatment  Discharge Plan A: Home Health                  Julio Fletcher  MD  Department of Hospital Medicine   [facility] - Med [facility] ([place])

## 2024-04-11 NOTE — PLAN OF CARE
Oxygen instruct with patient and family.  Instruction on use and transport.  Pt and family verbalized understanding.   [FreeTextEntry1] : consider nate but declined today  [place]
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
## 2024-04-11 NOTE — ASSESSMENT & PLAN NOTE
2/2 infection vs antalgic gait from previous stroke    CT brain with remote strokes   C spine not done in ED but on exam but has full ROM and no cervical neck pain. No FND other than dysarthria which is chronic from previous stroke.     Trop mildly elevated most likely in setting of RICCI and down trending. Denies chest pain.     CK mildly elevated     Plan  - PT/OT- [facility]   - Monitor CK - improving    - IVF - stopped after 48 hours

## 2024-04-11 NOTE — ASSESSMENT & PLAN NOTE
Suspect 2/2 dehydration  (less likely CK, only elevated to the 200's) - improving     Plan -   - cont IVF - stopped after 48 hours   - Monitor urine output and BMP

## 2024-04-11 NOTE — PLAN OF CARE
Assessment completed with wifeCharley over the phone. Patient is independent in ADLs, has no HH, and uses RW vs. Rollator for DME. Patient's spouse would like HH upon d/c. Patient spouse to transport patient home.    04/11/24 0942   Discharge Assessment   Assessment Type Discharge Planning Assessment   Confirmed/corrected address, phone number and insurance Yes   Confirmed Demographics Correct on Facesheet   Source of Information family   Communicated HAILY with patient/caregiver Date not available/Unable to determine   People in Home significant other   Do you expect to return to your current living situation? Yes   Prior to hospitilization cognitive status: Alert/Oriented   Current cognitive status: Alert/Oriented   Equipment Currently Used at Home rollator;walker, rolling   Readmission within 30 days? No   Patient currently being followed by outpatient case management? No   Do you currently have service(s) that help you manage your care at home? No   Do you take prescription medications? Yes   Do you have any problems affording any of your prescribed medications? No   Is the patient taking medications as prescribed? yes   How do you get to doctors appointments? family or friend will provide   Are you on dialysis? No   Do you take coumadin? No   Discharge Plan A Home Health   DME Needed Upon Discharge  none   Discharge Plan discussed with: Spouse/sig other   Name(s) and Number(s) Charley Saravia (Spouse) [phone]   Transition of Care Barriers None   Physical Activity   On average, how many days per week do you engage in moderate to strenuous exercise (like a brisk walk)? 2 days   On average, how many minutes do you engage in exercise at this level? 30 min   Financial Resource Strain   How hard is it for you to pay for the very basics like food, housing, medical care, and heating? Not hard   Housing Stability   In the last 12 months, was there a time when you were not able to pay the mortgage or rent on time? N    In the last 12 months, how many places have you lived? 1   In the last 12 months, was there a time when you did not have a steady place to sleep or slept in a shelter (including now)? N   Transportation Needs   In the past 12 months, has lack of transportation kept you from medical appointments or from getting medications? no   In the past 12 months, has lack of transportation kept you from meetings, work, or from getting things needed for daily living? No   Food Insecurity   Within the past 12 months, you worried that your food would run out before you got the money to buy more. Never true   Within the past 12 months, the food you bought just didn't last and you didn't have money to get more. Never true   Stress   Do you feel stress - tense, restless, nervous, or anxious, or unable to sleep at night because your mind is troubled all the time - these days? Not at all   Social Connections   In a typical week, how many times do you talk on the phone with family, friends, or neighbors? Once a week   How often do you get together with friends or relatives? Once   How often do you attend Voodoo or Nondenominational services? More than 4   Do you belong to any clubs or organizations such as Voodoo groups, unions, fraternal or athletic groups, or school groups? No   How often do you attend meetings of the clubs or organizations you belong to? Never   Are you , , , , never , or living with a partner?    Alcohol Use   Q1: How often do you have a drink containing alcohol? Never   Q2: How many drinks containing alcohol do you have on a typical day when you are drinking? None   Q3: How often do you have six or more drinks on one occasion? Never   OTHER   Name(s) of People in Home Charley Saravia (Spouse)     [facility] ([place])  Initial Discharge Assessment       Primary Care Provider: Frieda Galvan MD    Admission Diagnosis: Dehydration [E86.0]  UTI (urinary tract infection)  [N39.0]  Fatigue [R53.83]  Fall [W19.XXXA]  Acute cystitis with hematuria [N30.01]  Acute kidney injury [N17.9]  Altered mental status, unspecified altered mental status type [R41.82]  Fatigue, unspecified type [R53.83]  Leukocytosis, unspecified type [D72.829]    Admission Date: 4/9/2024  Expected Discharge Date:     Transition of Care Barriers: (P) None    Payor: HUMANA MANAGED MEDICARE / Plan: HUMANA MEDICARE SELECT PARTNER / Product Type: Medicare Advantage /     Extended Emergency Contact Information  Primary Emergency Contact: Charley Saravia  Address: [address] [place] [address] [facility]  Home Phone: [phone]  Mobile Phone: [phone]  Relation: Spouse  Secondary Emergency Contact: Ibis Harmon  Mobile Phone: [phone]  Relation: Daughter    Discharge Plan A: (P) Home Health         [facility]  [address]  [place] [address]  Phone: [phone] Fax: [phone]    [facility] - [place], [place] - [address]  [address] [facility] [address]  Phone: [phone] Fax: [phone]    [id number] [place], [place] - [address] AT [facility]  [address]  [facility] [address]  Phone: [phone] Fax: [phone]      Initial Assessment (most recent)       Adult Discharge Assessment - 04/11/24 0942          Discharge Assessment    Assessment Type Discharge Planning Assessment (P)      Confirmed/corrected address, phone number and insurance Yes (P)      Confirmed Demographics Correct on Facesheet (P)      Source of Information family (P)      Communicated HAILY with patient/caregiver Date not available/Unable to determine (P)      People in Home significant other (P)      Do you expect to return to your current living situation? Yes (P)      Prior to hospitilization cognitive status: Alert/Oriented (P)      Current cognitive status: Alert/Oriented (P)       Equipment Currently Used at Home rollator;isabel baires (P)      Readmission within 30 days? No (P)      Patient currently being followed by outpatient case management? No (P)      Do you currently have service(s) that help you manage your care at home? No (P)      Do you take prescription medications? Yes (P)      Do you have any problems affording any of your prescribed medications? No (P)      Is the patient taking medications as prescribed? yes (P)      How do you get to doctors appointments? family or friend will provide (P)      Are you on dialysis? No (P)      Do you take coumadin? No (P)      Discharge Plan A Home Health (P)      DME Needed Upon Discharge  none (P)      Discharge Plan discussed with: Spouse/sig other (P)      Name(s) and Number(s) Charley Saravia (Spouse) [phone] (P)      Transition of Care Barriers None (P)         Physical Activity    On average, how many days per week do you engage in moderate to strenuous exercise (like a brisk walk)? 2 days (P)      On average, how many minutes do you engage in exercise at this level? 30 min (P)         Financial Resource Strain    How hard is it for you to pay for the very basics like food, housing, medical care, and heating? Not hard at all (P)         Housing Stability    In the last 12 months, was there a time when you were not able to pay the mortgage or rent on time? No (P)      In the last 12 months, how many places have you lived? 1 (P)      In the last 12 months, was there a time when you did not have a steady place to sleep or slept in a shelter (including now)? No (P)         Transportation Needs    In the past 12 months, has lack of transportation kept you from medical appointments or from getting medications? No (P)      In the past 12 months, has lack of transportation kept you from meetings, work, or from getting things needed for daily living? No (P)         Food Insecurity    Within the past 12 months, you worried that your food  would run out before you got the money to buy more. Never true (P)      Within the past 12 months, the food you bought just didn't last and you didn't have money to get more. Never true (P)         Stress    Do you feel stress - tense, restless, nervous, or anxious, or unable to sleep at night because your mind is troubled all the time - these days? Not at all (P)         Social Connections    In a typical week, how many times do you talk on the phone with family, friends, or neighbors? Once a week (P)      How often do you get together with friends or relatives? Once a week (P)      How often do you attend Holiness or Rastafari services? More than 4 times per year (P)      Do you belong to any clubs or organizations such as Holiness groups, unions, fraternal or athletic groups, or school groups? No (P)      How often do you attend meetings of the clubs or organizations you belong to? Never (P)      Are you , , , , never , or living with a partner?  (P)         Alcohol Use    Q1: How often do you have a drink containing alcohol? Never (P)      Q2: How many drinks containing alcohol do you have on a typical day when you are drinking? Patient does not drink (P)      Q3: How often do you have six or more drinks on one occasion? Never (P)         OTHER    Name(s) of People in Home Charley Saravia (Spouse) (P)

## 2024-04-11 NOTE — SUBJECTIVE & OBJECTIVE
Interval History: Pt doing well. Looking forward to leaving hospital. Explained plan of care.     Review of Systems   Constitutional:  Negative for appetite change, chills, diaphoresis and fever.   Respiratory:  Negative for cough, shortness of breath and wheezing.    Cardiovascular:  Negative for chest pain and palpitations.   Gastrointestinal:  Negative for abdominal pain, constipation, diarrhea, nausea and vomiting.   Genitourinary:  Negative for decreased urine volume, difficulty urinating, dysuria, frequency, hematuria and urgency.   Neurological:  Negative for dizziness, syncope, weakness, light-headedness and headaches.   Hematological:  Does not bruise/bleed easily.   Psychiatric/Behavioral:  Negative for agitation and confusion.      Objective:     Vital Signs (Most Recent):  Temp: 98.3 °F (36.8 °C) (04/11/24 1557)  Pulse: 65 (04/11/24 1557)  Resp: 18 (04/11/24 1557)  BP: 128/73 (04/11/24 1557)  SpO2: (!) 92 % (04/11/24 1557) Vital Signs (24h Range):  Temp:  [98.3 °F (36.8 °C)-99.1 °F (37.3 °C)] 98.3 °F (36.8 °C)  Pulse:  [63-87] 65  Resp:  [16-19] 18  SpO2:  [87 %-95 %] 92 %  BP: (127-154)/(69-84) 128/73     Weight: 71.1 kg (156 lb 12 oz)  Body mass index is 23.83 kg/m².    Intake/Output Summary (Last 24 hours) at 4/11/2024 1639  Last data filed at 4/11/2024 1300  Gross per 24 hour   Intake 600 ml   Output 820 ml   Net -220 ml         Physical Exam  Constitutional:       General: He is not in acute distress.  Pulmonary:      Effort: No respiratory distress.      Breath sounds: No wheezing.   Abdominal:      General: There is no distension.      Tenderness: There is no abdominal tenderness.   Musculoskeletal:      Right lower leg: No edema.      Left lower leg: No edema.   Neurological:      Mental Status: He is oriented to person, place, and time.      Comments: No FND other than dysarthria              Significant Labs: All pertinent labs within the past 24 hours have been reviewed.    Significant  Imaging: I have reviewed all pertinent imaging results/findings within the past 24 hours.

## 2024-04-11 NOTE — PT/OT/SLP EVAL
Occupational Therapy   Evaluation and Treatment    Name: Dwayne Saravia  MRN: [id number]  Admitting Diagnosis: Fall  Recent Surgery: * No surgery found *      Recommendations:     Discharge Recommendations: Moderate Intensity Therapy  Discharge Equipment Recommendations:  rollator, bedside commode, bath bench  Barriers to discharge:  Decreased caregiver support (Current functional level)    Assessment:     Dwayne Saravia is a 89 y.o. male with a medical diagnosis of Fall.  He presents with the following performance deficits affecting function: weakness, impaired endurance, impaired self care skills, impaired functional mobility, gait instability, impaired balance, decreased safety awareness, decreased lower extremity function, decreased upper extremity function, impaired fine motor, impaired coordination, impaired cognition, decreased ROM, abnormal tone, impaired cardiopulmonary response to activity.      Rehab Prognosis:  Good to return to [facility] ; patient would benefit from acute skilled OT services to address these deficits and reach maximum level of function.       Plan:     Patient to be seen 5 x/week to address the above listed problems via self-care/home management, therapeutic activities, therapeutic exercises  Plan of Care Expires: 05/11/24  Plan of Care Reviewed with: patient, spouse    Subjective     Chief Complaint: None  Patient/Family Comments/goals: Pt's wife stating she would like pt to get therapy at an inpatient facility prior to coming home.  Pt's wife reporting her 49 y/o daughter just had a massive stroke, was in [facility] for 5 months and was living on her own but now she is going to have to live with her and pt.      Occupational Profile:  Living Environment: Lives with wife in 2 , 6 steps to enter, bilateral HR, had an electric lift but it is broken, tub/shower  Previous level of function: Mod I with rollator for ADL mobility, Min A at times for self care tasks that requires fine motor skills such  as fastening buttons and zippers, uses a reacher for LB dressing, pt's wife drops off and picks up some children from school so pt is home alone at times  Roles and Routines: Enjoys [employer] music, enjoys visiting and talking on phone with friends and family  Equipment Used at Home: rollator, walker, rolling  Assistance upon Discharge: Wife    Pain/Comfort:  Pain Rating 1: 0/10  Pain Rating Post-Intervention 1: 0/10    Patients cultural, spiritual, Yazidism conflicts given the current situation:  No    Objective:     Communicated with: nurse prior to session.  Patient found HOB elevated with peripheral IV (Wife in room) upon OT entry to room.    General Precautions: Standard, aphasia, fall, respiratory  Orthopedic Precautions: N/A  Braces: N/A  Respiratory Status: Room air    Occupational Performance:    Bed Mobility:    Supine to sit: Min A  Sit to supine: Min A    Functional Mobility/Transfers:  Sit to stand: Min A with rollator, cues for safety and hand placement  Toilet transfer: Min A with use of grab bar and rollator.   Functional Mobility: Rollator with verbal and tactile cues for navigation and safety    Activities of Daily Living:  Grooming: Washing face: Set up/SBA; Oral Care: Min A, top and bottom dentures  UB Dressing: Mod A  LB Dressing: Right sock - Set up/SBA; Left sock - Mod A  Toileting: Mod A    Cognitive/Visual Perceptual:  Cognitive/Psychosocial Skills:     -       Oriented to: Person, place, month, day of week and year   -       Follows Commands/attention:Follows one-step commands  -       Communication: Aphasia  -       Safety awareness/insight to disability: impaired   -       Mood/Affect/Coping skills/emotional control: Cooperative and Pleasant  Wears glasses    Physical Exam:  Skin: Dry  Edema: None noted  Bilateral UE Function: Shoulder Flexion AROM 75%, 4-/5 through available AROM                                      Elbow flexion/extension 4/5       4-/5   Posture: Forward Head,  Rounded shoulders  Activity tolerance: Fair  Sitting Balance: Supervision (static), SBA<>CGA (dynamic)  Standing Balance: Poor (static), Poor (dynamic)    AMPAC 6 Click ADL:  AMPAC Total Score: 14    Treatment & Education:  Role of OT, POC, safety with ADL and ADL mobility, use of BSC for toileting when attached to IV pole to decrease fall risk, discharge recs     Patient left HOB elevated with all lines intact, call button in reach, bed alarm on, nurse notified, and wife present    GOALS:   Multidisciplinary Problems       Occupational Therapy Goals          Problem: Occupational Therapy    Goal Priority Disciplines Outcome Interventions   Occupational Therapy Goal     OT, PT/OT Ongoing, Progressing    Description: Goals to be met by: 4/27/24     Patient will increase functional independence with ADLs by performing:    Grooming standing at sink at SBA.  Toileting at SBA.   Toilet/BSC transfer at SBA.  UB dressing at Set up/SBA (excluding ties/buttons).  Donning underwear/pants at Set up/SBA using reacher as needed.  Donning socks at Set up/SBA using AE as needed.  Sponge bathing at Min A.                         History:     Past Medical History:   Diagnosis Date    Arthritis     BPH (benign prostatic hyperplasia)     CVA (cerebral vascular accident) 2007    Hypertension          Past Surgical History:   Procedure Laterality Date    PROSTATE SURGERY      2016       Time Tracking:     OT Date of Treatment: 04/11/24  OT Start Time: 1148  OT Stop Time: 1250  OT Total Time (min): 62 min    Billable Minutes:Evaluation 12  Self Care/Home Management 40    4/11/2024

## 2024-04-11 NOTE — PLAN OF CARE
Medicare Message     Important Message from Medicare regarding Discharge Appeal Rights -- Given to patient/caregiver; Explained to patient/caregiver; Signed/date by patient/caregiver   Date IMM was signed -- 4/11/2024   Time IMM was signed -- [id number]

## 2024-04-12 LAB
ANION GAP SERPL CALC-SCNC: 10 MMOL/L (ref 8–16)
BACTERIA UR CULT: ABNORMAL
BUN SERPL-MCNC: 22 MG/DL (ref 8–23)
CALCIUM SERPL-MCNC: 8.8 MG/DL (ref 8.7–10.5)
CHLORIDE SERPL-SCNC: 110 MMOL/L (ref 95–110)
CO2 SERPL-SCNC: 20 MMOL/L (ref 23–29)
CREAT SERPL-MCNC: 1.7 MG/DL (ref 0.5–1.4)
ERYTHROCYTE [DISTWIDTH] IN BLOOD BY AUTOMATED COUNT: 14.6 % (ref 11.5–14.5)
EST. GFR  (NO RACE VARIABLE): 38 ML/MIN/1.73 M^2
GLUCOSE SERPL-MCNC: 116 MG/DL (ref 70–110)
HCT VFR BLD AUTO: 34.8 % (ref 40–54)
HGB BLD-MCNC: 11.4 G/DL (ref 14–18)
MAGNESIUM SERPL-MCNC: 1.9 MG/DL (ref 1.6–2.6)
MCH RBC QN AUTO: 30.6 PG (ref 27–31)
MCHC RBC AUTO-ENTMCNC: 32.8 G/DL (ref 32–36)
MCV RBC AUTO: 93 FL (ref 82–98)
OHS QRS DURATION: 86 MS
OHS QTC CALCULATION: 415 MS
PHOSPHATE SERPL-MCNC: 3 MG/DL (ref 2.7–4.5)
PLATELET # BLD AUTO: 213 K/UL (ref 150–450)
PMV BLD AUTO: 11.8 FL (ref 9.2–12.9)
POTASSIUM SERPL-SCNC: 4.3 MMOL/L (ref 3.5–5.1)
RBC # BLD AUTO: 3.73 M/UL (ref 4.6–6.2)
SODIUM SERPL-SCNC: 140 MMOL/L (ref 136–145)
WBC # BLD AUTO: 10.37 K/UL (ref 3.9–12.7)

## 2024-04-12 PROCEDURE — 25000003 PHARM REV CODE 250: Performed by: PHYSICIAN ASSISTANT

## 2024-04-12 PROCEDURE — 86580 TB INTRADERMAL TEST: CPT | Performed by: STUDENT IN AN ORGANIZED HEALTH CARE EDUCATION/TRAINING PROGRAM

## 2024-04-12 PROCEDURE — 63600175 PHARM REV CODE 636 W HCPCS: Performed by: PHYSICIAN ASSISTANT

## 2024-04-12 PROCEDURE — A4216 STERILE WATER/SALINE, 10 ML: HCPCS | Performed by: PHYSICIAN ASSISTANT

## 2024-04-12 PROCEDURE — 99900035 HC TECH TIME PER 15 MIN (STAT)

## 2024-04-12 PROCEDURE — 85027 COMPLETE CBC AUTOMATED: CPT | Performed by: STUDENT IN AN ORGANIZED HEALTH CARE EDUCATION/TRAINING PROGRAM

## 2024-04-12 PROCEDURE — 30200315 PPD INTRADERMAL TEST REV CODE 302: Performed by: STUDENT IN AN ORGANIZED HEALTH CARE EDUCATION/TRAINING PROGRAM

## 2024-04-12 PROCEDURE — 84100 ASSAY OF PHOSPHORUS: CPT | Performed by: STUDENT IN AN ORGANIZED HEALTH CARE EDUCATION/TRAINING PROGRAM

## 2024-04-12 PROCEDURE — 25000003 PHARM REV CODE 250: Performed by: STUDENT IN AN ORGANIZED HEALTH CARE EDUCATION/TRAINING PROGRAM

## 2024-04-12 PROCEDURE — 94761 N-INVAS EAR/PLS OXIMETRY MLT: CPT

## 2024-04-12 PROCEDURE — 93010 ELECTROCARDIOGRAM REPORT: CPT | Mod: ,,, | Performed by: INTERNAL MEDICINE

## 2024-04-12 PROCEDURE — 94640 AIRWAY INHALATION TREATMENT: CPT

## 2024-04-12 PROCEDURE — 93005 ELECTROCARDIOGRAM TRACING: CPT

## 2024-04-12 PROCEDURE — 21400001 HC TELEMETRY ROOM

## 2024-04-12 PROCEDURE — 80048 BASIC METABOLIC PNL TOTAL CA: CPT | Performed by: STUDENT IN AN ORGANIZED HEALTH CARE EDUCATION/TRAINING PROGRAM

## 2024-04-12 PROCEDURE — 36415 COLL VENOUS BLD VENIPUNCTURE: CPT | Performed by: STUDENT IN AN ORGANIZED HEALTH CARE EDUCATION/TRAINING PROGRAM

## 2024-04-12 PROCEDURE — 83735 ASSAY OF MAGNESIUM: CPT | Performed by: STUDENT IN AN ORGANIZED HEALTH CARE EDUCATION/TRAINING PROGRAM

## 2024-04-12 RX ORDER — LOSARTAN POTASSIUM 50 MG/1
100 TABLET ORAL DAILY
Status: DISCONTINUED | OUTPATIENT
Start: 2024-04-12 | End: 2024-04-15 | Stop reason: HOSPADM

## 2024-04-12 RX ADMIN — LOSARTAN POTASSIUM 100 MG: 50 TABLET, FILM COATED ORAL at 08:04

## 2024-04-12 RX ADMIN — ASPIRIN 81 MG CHEWABLE TABLET 81 MG: 81 TABLET CHEWABLE at 08:04

## 2024-04-12 RX ADMIN — NIFEDIPINE 60 MG: 30 TABLET, FILM COATED, EXTENDED RELEASE ORAL at 08:04

## 2024-04-12 RX ADMIN — Medication 10 ML: at 09:04

## 2024-04-12 RX ADMIN — FLUTICASONE FUROATE AND VILANTEROL TRIFENATATE 1 PUFF: 100; 25 POWDER RESPIRATORY (INHALATION) at 07:04

## 2024-04-12 RX ADMIN — TUBERCULIN PURIFIED PROTEIN DERIVATIVE 5 UNITS: 5 INJECTION, SOLUTION INTRADERMAL at 03:04

## 2024-04-12 RX ADMIN — HEPARIN SODIUM 5000 UNITS: 5000 INJECTION INTRAVENOUS; SUBCUTANEOUS at 09:04

## 2024-04-12 RX ADMIN — HEPARIN SODIUM 5000 UNITS: 5000 INJECTION INTRAVENOUS; SUBCUTANEOUS at 05:04

## 2024-04-12 RX ADMIN — CARVEDILOL 6.25 MG: 6.25 TABLET, FILM COATED ORAL at 08:04

## 2024-04-12 RX ADMIN — Medication 10 ML: at 01:04

## 2024-04-12 RX ADMIN — CIPROFLOXACIN 400 MG: 2 INJECTION, SOLUTION INTRAVENOUS at 01:04

## 2024-04-12 RX ADMIN — ATORVASTATIN CALCIUM 40 MG: 20 TABLET, FILM COATED ORAL at 08:04

## 2024-04-12 RX ADMIN — Medication 10 ML: at 05:04

## 2024-04-12 RX ADMIN — HEPARIN SODIUM 5000 UNITS: 5000 INJECTION INTRAVENOUS; SUBCUTANEOUS at 01:04

## 2024-04-12 NOTE — PLAN OF CARE
SW received call from [facility]  x [phone], patient insurance not in network with [facility].

## 2024-04-12 NOTE — CARE UPDATE
04/12/24 0748   PRE-TX-O2   Device (Oxygen Therapy) room air   SpO2 (!) 93 %   Pulse Oximetry Type Intermittent   $ Pulse Oximetry - Multiple Charge Pulse Oximetry - Multiple   Pulse 74   Resp 18   Inhaler   $ Inhaler Charges MDI (Metered Dose Inahler) Treatment;Mouth rinsed post treatment   Daily Review of Necessity (Inhaler) completed   Respiratory Treatment Status (Inhaler) given   Treatment Route (Inhaler) mouthpiece   Patient Position (Inhaler) sitting on edge of bed   Post Treatment Assessment (Inhaler) breath sounds unchanged   Signs of Intolerance (Inhaler) none

## 2024-04-13 LAB
ANION GAP SERPL CALC-SCNC: 7 MMOL/L (ref 8–16)
BUN SERPL-MCNC: 20 MG/DL (ref 8–23)
CALCIUM SERPL-MCNC: 8.7 MG/DL (ref 8.7–10.5)
CHLORIDE SERPL-SCNC: 109 MMOL/L (ref 95–110)
CO2 SERPL-SCNC: 22 MMOL/L (ref 23–29)
CREAT SERPL-MCNC: 1.8 MG/DL (ref 0.5–1.4)
ERYTHROCYTE [DISTWIDTH] IN BLOOD BY AUTOMATED COUNT: 14.7 % (ref 11.5–14.5)
EST. GFR  (NO RACE VARIABLE): 36 ML/MIN/1.73 M^2
GLUCOSE SERPL-MCNC: 97 MG/DL (ref 70–110)
HCT VFR BLD AUTO: 35.8 % (ref 40–54)
HGB BLD-MCNC: 11.8 G/DL (ref 14–18)
MCH RBC QN AUTO: 30.9 PG (ref 27–31)
MCHC RBC AUTO-ENTMCNC: 33 G/DL (ref 32–36)
MCV RBC AUTO: 94 FL (ref 82–98)
PLATELET # BLD AUTO: 256 K/UL (ref 150–450)
PMV BLD AUTO: 11.5 FL (ref 9.2–12.9)
POTASSIUM SERPL-SCNC: 4 MMOL/L (ref 3.5–5.1)
RBC # BLD AUTO: 3.82 M/UL (ref 4.6–6.2)
SODIUM SERPL-SCNC: 138 MMOL/L (ref 136–145)
WBC # BLD AUTO: 8.89 K/UL (ref 3.9–12.7)

## 2024-04-13 PROCEDURE — 25000003 PHARM REV CODE 250: Performed by: PHYSICIAN ASSISTANT

## 2024-04-13 PROCEDURE — 63600175 PHARM REV CODE 636 W HCPCS: Performed by: PHYSICIAN ASSISTANT

## 2024-04-13 PROCEDURE — 36415 COLL VENOUS BLD VENIPUNCTURE: CPT | Performed by: STUDENT IN AN ORGANIZED HEALTH CARE EDUCATION/TRAINING PROGRAM

## 2024-04-13 PROCEDURE — 85027 COMPLETE CBC AUTOMATED: CPT | Performed by: STUDENT IN AN ORGANIZED HEALTH CARE EDUCATION/TRAINING PROGRAM

## 2024-04-13 PROCEDURE — 21400001 HC TELEMETRY ROOM

## 2024-04-13 PROCEDURE — 94640 AIRWAY INHALATION TREATMENT: CPT

## 2024-04-13 PROCEDURE — 94761 N-INVAS EAR/PLS OXIMETRY MLT: CPT

## 2024-04-13 PROCEDURE — A4216 STERILE WATER/SALINE, 10 ML: HCPCS | Performed by: PHYSICIAN ASSISTANT

## 2024-04-13 PROCEDURE — 80048 BASIC METABOLIC PNL TOTAL CA: CPT | Performed by: STUDENT IN AN ORGANIZED HEALTH CARE EDUCATION/TRAINING PROGRAM

## 2024-04-13 PROCEDURE — 25000003 PHARM REV CODE 250: Performed by: STUDENT IN AN ORGANIZED HEALTH CARE EDUCATION/TRAINING PROGRAM

## 2024-04-13 RX ADMIN — CIPROFLOXACIN 400 MG: 2 INJECTION, SOLUTION INTRAVENOUS at 01:04

## 2024-04-13 RX ADMIN — ASPIRIN 81 MG CHEWABLE TABLET 81 MG: 81 TABLET CHEWABLE at 08:04

## 2024-04-13 RX ADMIN — FLUTICASONE FUROATE AND VILANTEROL TRIFENATATE 1 PUFF: 100; 25 POWDER RESPIRATORY (INHALATION) at 07:04

## 2024-04-13 RX ADMIN — CARVEDILOL 6.25 MG: 6.25 TABLET, FILM COATED ORAL at 08:04

## 2024-04-13 RX ADMIN — HEPARIN SODIUM 5000 UNITS: 5000 INJECTION INTRAVENOUS; SUBCUTANEOUS at 06:04

## 2024-04-13 RX ADMIN — NIFEDIPINE 60 MG: 30 TABLET, FILM COATED, EXTENDED RELEASE ORAL at 09:04

## 2024-04-13 RX ADMIN — Medication 10 ML: at 02:04

## 2024-04-13 RX ADMIN — LOSARTAN POTASSIUM 100 MG: 50 TABLET, FILM COATED ORAL at 08:04

## 2024-04-13 RX ADMIN — HEPARIN SODIUM 5000 UNITS: 5000 INJECTION INTRAVENOUS; SUBCUTANEOUS at 02:04

## 2024-04-13 RX ADMIN — Medication 10 ML: at 10:04

## 2024-04-13 RX ADMIN — SENNOSIDES AND DOCUSATE SODIUM 1 TABLET: 8.6; 5 TABLET ORAL at 09:04

## 2024-04-13 RX ADMIN — CARVEDILOL 6.25 MG: 6.25 TABLET, FILM COATED ORAL at 09:04

## 2024-04-13 RX ADMIN — Medication 10 ML: at 05:04

## 2024-04-13 RX ADMIN — NIFEDIPINE 60 MG: 30 TABLET, FILM COATED, EXTENDED RELEASE ORAL at 08:04

## 2024-04-13 RX ADMIN — HEPARIN SODIUM 5000 UNITS: 5000 INJECTION INTRAVENOUS; SUBCUTANEOUS at 09:04

## 2024-04-13 RX ADMIN — SENNOSIDES AND DOCUSATE SODIUM 1 TABLET: 8.6; 5 TABLET ORAL at 04:04

## 2024-04-13 RX ADMIN — ATORVASTATIN CALCIUM 40 MG: 20 TABLET, FILM COATED ORAL at 08:04

## 2024-04-13 NOTE — PROGRESS NOTES
[facility] Medicine  Progress Note    Patient Name: Dwayne Saravia  MRN: [id number]  Patient Class: IP- Inpatient   Admission Date: 4/9/2024  Length of Stay: 3 days  Attending Physician: Julio Ramirez MD  Primary Care Provider: Frieda Galvan MD        Subjective:     Principal Problem:Fall        HPI:  Mr. Dwayne Saravia is a 89 y.o. male, with PMH of HTN, COPD, CKD3b, GERD, antalgic gait & dysarthria & right hemiparesis s/p cerebral infarction, who [place] [facility] via EMS on 4/9/24 via EMS after his wife found him on the living room floor. It was unclear if he slid off of the cough or slipped and fell. His wife reported that he was in his normal state of health earlier today when his wife returned from work she found him on the ground at 5 pm, having fallen down. His wife and daughter assisted him up onto his walker. Once sitting, he seemed confused at home. He was alert earlier in the day, and was alert and oriented in the ED. He began to become confused once he arrived to the ED, he began to be confused, asking his daughter to help him up to a sitting position on his rolling scooter. After he was sitting, he appeared dazed and as if he was starting into the distance. He was eluated in the ED with labs showing leukocytosis of 22K, with left sfhit. H&H were normal at 13.7/40.7. He was treated in the ED with 1L NS and a dose of ciprofloxacin. He was placed on observation.     Overview/Hospital Course:  RICCI improving. CK only midly elevated but monitoring and hydrating with IV fluids.  Initial trop elevated but down trending. WBC still elevated with mild downtrend. Cont IV abx while awaiting urine cultures - gram neg rods, pan sensitive Ecoli. Awaiting PT/O - [facility]. C spine not done in ED but on exam but has full ROM and no cervical neck pain. No FND other than dysarthria which is chronic from previous stroke. Pt stable for discharge, awaiting placement.     Interval History:  Pt with no complaints today.     Review of Systems   Constitutional:  Negative for appetite change, chills, diaphoresis and fever.   Respiratory:  Negative for cough, shortness of breath and wheezing.    Cardiovascular:  Negative for chest pain and palpitations.   Gastrointestinal:  Negative for abdominal pain, constipation, diarrhea, nausea and vomiting.   Genitourinary:  Negative for decreased urine volume, difficulty urinating, dysuria, frequency, hematuria and urgency.   Neurological:  Negative for dizziness, syncope, weakness, light-headedness and headaches.   Hematological:  Does not bruise/bleed easily.   Psychiatric/Behavioral:  Negative for agitation and confusion.      Objective:     Vital Signs (Most Recent):  Temp: 97 °F (36.1 °C) (04/13/24 0844)  Pulse: 71 (04/13/24 0844)  Resp: 18 (04/13/24 0844)  BP: (!) 158/80 (04/13/24 0844)  SpO2: (!) 92 % (04/13/24 0844) Vital Signs (24h Range):  Temp:  [97 °F (36.1 °C)-98.4 °F (36.9 °C)] 97 °F (36.1 °C)  Pulse:  [63-79] 71  Resp:  [16-20] 18  SpO2:  [92 %-96 %] 92 %  BP: (131-158)/(68-87) 158/80     Weight: 71.1 kg (156 lb 12 oz)  Body mass index is 23.83 kg/m².    Intake/Output Summary (Last 24 hours) at 4/13/2024 1109  Last data filed at 4/13/2024 0612  Gross per 24 hour   Intake --   Output 400 ml   Net -400 ml         Physical Exam  Constitutional:       General: He is not in acute distress.  Pulmonary:      Effort: No respiratory distress.      Breath sounds: No wheezing.   Abdominal:      General: There is no distension.      Tenderness: There is no abdominal tenderness.   Musculoskeletal:      Right lower leg: No edema.      Left lower leg: No edema.   Neurological:      Mental Status: He is oriented to person, place, and time.      Comments: No FND other than dysarthria              Significant Labs: All pertinent labs within the past 24 hours have been reviewed.    Significant Imaging: I have reviewed all pertinent imaging results/findings within the  past 24 hours.    Assessment/Plan:      * Fall  2/2 infection vs antalgic gait from previous stroke    CT brain with remote strokes   C spine not done in ED but on exam but has full ROM and no cervical neck pain. No FND other than dysarthria which is chronic from previous stroke.     Trop mildly elevated most likely in setting of RICCI and down trending. Denies chest pain.     CK mildly elevated     Plan  - PT/OT- [facility]   - Monitor CK - improving    - IVF - stopped after 48 hours            Acute cystitis  Leukocytosis - Resolved     UA pos for 3+ leukocytes  Pt denies sx of dysuria    Plan  - Urine cx - gram neg rods, pan sensitive Ecoli - complete 5 days of cipro   - Started on cipro in ED - continue     Acute renal failure superimposed on stage 3 chronic kidney disease  Suspect 2/2 dehydration  (less likely CK, only elevated to the 200's) - improving     Plan -   - cont IVF - stopped after 48 hours   - Monitor urine output and BMP     Essential hypertension  - continue home carvedilol and nifedipine  - initially started on losartan but we will hold losartan and hydrochlorothiazide in light of RICCI    Other emphysema  Does not appear to be in exacerbation  Continue daily steroid inhaler  DuoNebs p.r.n.    Gastroesophageal reflux disease without esophagitis        H/O: CVA (cerebrovascular accident)  Dysarthria following cerebral infarction     Plan -   - fall precautions   - aspiration precautions   - continue statin, ASA        VTE Risk Mitigation (From admission, onward)           Ordered     heparin (porcine) injection 5,000 Units  Every 8 hours         04/10/24 0226     IP VTE HIGH RISK PATIENT  Once         04/10/24 0226     Place sequential compression device  Until discontinued         04/10/24 0226                    Discharge Planning   HAILY:      Code Status: Full Code   Is the patient medically ready for discharge?:     Reason for patient still in hospital (select all that apply): Treatment and Pending  disposition  Discharge Plan A: Skilled Nursing Facility   Discharge Delays: (!) Post-Acute Set-up              Julio Fletcher MD  Department of Hospital Medicine   [facility] - Med Surg ([place])

## 2024-04-13 NOTE — SUBJECTIVE & OBJECTIVE
Interval History: Pt with no complaints today.     Review of Systems   Constitutional:  Negative for appetite change, chills, diaphoresis and fever.   Respiratory:  Negative for cough, shortness of breath and wheezing.    Cardiovascular:  Negative for chest pain and palpitations.   Gastrointestinal:  Negative for abdominal pain, constipation, diarrhea, nausea and vomiting.   Genitourinary:  Negative for decreased urine volume, difficulty urinating, dysuria, frequency, hematuria and urgency.   Neurological:  Negative for dizziness, syncope, weakness, light-headedness and headaches.   Hematological:  Does not bruise/bleed easily.   Psychiatric/Behavioral:  Negative for agitation and confusion.      Objective:     Vital Signs (Most Recent):  Temp: 97 °F (36.1 °C) (04/13/24 0844)  Pulse: 71 (04/13/24 0844)  Resp: 18 (04/13/24 0844)  BP: (!) 158/80 (04/13/24 0844)  SpO2: (!) 92 % (04/13/24 0844) Vital Signs (24h Range):  Temp:  [97 °F (36.1 °C)-98.4 °F (36.9 °C)] 97 °F (36.1 °C)  Pulse:  [63-79] 71  Resp:  [16-20] 18  SpO2:  [92 %-96 %] 92 %  BP: (131-158)/(68-87) 158/80     Weight: 71.1 kg (156 lb 12 oz)  Body mass index is 23.83 kg/m².    Intake/Output Summary (Last 24 hours) at 4/13/2024 1109  Last data filed at 4/13/2024 0612  Gross per 24 hour   Intake --   Output 400 ml   Net -400 ml         Physical Exam  Constitutional:       General: He is not in acute distress.  Pulmonary:      Effort: No respiratory distress.      Breath sounds: No wheezing.   Abdominal:      General: There is no distension.      Tenderness: There is no abdominal tenderness.   Musculoskeletal:      Right lower leg: No edema.      Left lower leg: No edema.   Neurological:      Mental Status: He is oriented to person, place, and time.      Comments: No FND other than dysarthria              Significant Labs: All pertinent labs within the past 24 hours have been reviewed.    Significant Imaging: I have reviewed all pertinent imaging  results/findings within the past 24 hours.

## 2024-04-13 NOTE — ASSESSMENT & PLAN NOTE
Leukocytosis - Resolved     UA pos for 3+ leukocytes  Pt denies sx of dysurea    Plan  - Urine cx - gram neg rods, pan sensitive Ecoli - complete 5 days of cipro   - Started on cipro in ED - continue

## 2024-04-13 NOTE — PLAN OF CARE
Problem: Adult Inpatient Plan of Care  Goal: Plan of Care Review  Outcome: Ongoing, Progressing  Goal: Patient-Specific Goal (Individualized)  Outcome: Ongoing, Progressing  Goal: Absence of Hospital-Acquired Illness or Injury  Outcome: Ongoing, Progressing  Goal: Optimal Comfort and Wellbeing  Outcome: Ongoing, Progressing  Goal: Readiness for Transition of Care  Outcome: Ongoing, Progressing     Problem: Fluid and Electrolyte Imbalance (Acute Kidney Injury/Impairment)  Goal: Fluid and Electrolyte Balance  Outcome: Ongoing, Progressing     Problem: Oral Intake Inadequate (Acute Kidney Injury/Impairment)  Goal: Optimal Nutrition Intake  Outcome: Ongoing, Progressing     Problem: Renal Function Impairment (Acute Kidney Injury/Impairment)  Goal: Effective Renal Function  Outcome: Ongoing, Progressing     Problem: Fall Injury Risk  Goal: Absence of Fall and Fall-Related Injury  Outcome: Ongoing, Progressing     Problem: Adjustment to Illness COPD (Chronic Obstructive Pulmonary Disease)  Goal: Optimal Chronic Illness Coping  Outcome: Ongoing, Progressing     Problem: Functional Ability Impaired COPD (Chronic Obstructive Pulmonary Disease)  Goal: Optimal Level of Functional [place]  Outcome: Ongoing, Progressing     Problem: Respiratory Compromise COPD (Chronic Obstructive Pulmonary Disease)  Goal: Effective Oxygenation and Ventilation  Outcome: Ongoing, Progressing

## 2024-04-13 NOTE — ASSESSMENT & PLAN NOTE
Leukocytosis - Resolved     UA pos for 3+ leukocytes  Pt denies sx of dysuria    Plan  - Urine cx - gram neg rods, pan sensitive Ecoli - complete 5 days of cipro   - Started on cipro in ED - continue

## 2024-04-13 NOTE — SUBJECTIVE & OBJECTIVE
Interval History: Discussed placement with pt and wife over phone.     Review of Systems   Constitutional:  Negative for appetite change, chills, diaphoresis and fever.   Respiratory:  Negative for cough, shortness of breath and wheezing.    Cardiovascular:  Negative for chest pain and palpitations.   Gastrointestinal:  Negative for abdominal pain, constipation, diarrhea, nausea and vomiting.   Genitourinary:  Negative for decreased urine volume, difficulty urinating, dysuria, frequency, hematuria and urgency.   Neurological:  Negative for dizziness, syncope, weakness, light-headedness and headaches.   Hematological:  Does not bruise/bleed easily.   Psychiatric/Behavioral:  Negative for agitation and confusion.      Objective:     Vital Signs (Most Recent):  Temp: 98 °F (36.7 °C) (04/13/24 0405)  Pulse: 75 (04/13/24 0405)  Resp: 20 (04/13/24 0405)  BP: (!) 157/78 (04/13/24 0405)  SpO2: 96 % (04/13/24 0405) Vital Signs (24h Range):  Temp:  [97.9 °F (36.6 °C)-98.5 °F (36.9 °C)] 98 °F (36.7 °C)  Pulse:  [59-79] 75  Resp:  [16-20] 20  SpO2:  [92 %-96 %] 96 %  BP: (131-157)/(68-87) 157/78     Weight: 71.1 kg (156 lb 12 oz)  Body mass index is 23.83 kg/m².    Intake/Output Summary (Last 24 hours) at 4/13/2024 0621  Last data filed at 4/13/2024 0612  Gross per 24 hour   Intake --   Output 600 ml   Net -600 ml         Physical Exam  Constitutional:       General: He is not in acute distress.  Pulmonary:      Effort: No respiratory distress.      Breath sounds: No wheezing.   Abdominal:      General: There is no distension.      Tenderness: There is no abdominal tenderness.   Musculoskeletal:      Right lower leg: No edema.      Left lower leg: No edema.   Neurological:      Mental Status: He is oriented to person, place, and time.      Comments: No FND other than dysarthria              Significant Labs: All pertinent labs within the past 24 hours have been reviewed.    Significant Imaging: I have reviewed all pertinent  imaging results/findings within the past 24 hours.

## 2024-04-14 LAB — TB INDURATION 48 - 72 HR READ: 0 MM

## 2024-04-14 PROCEDURE — 25000003 PHARM REV CODE 250: Performed by: PHYSICIAN ASSISTANT

## 2024-04-14 PROCEDURE — 25000003 PHARM REV CODE 250: Performed by: STUDENT IN AN ORGANIZED HEALTH CARE EDUCATION/TRAINING PROGRAM

## 2024-04-14 PROCEDURE — 99900035 HC TECH TIME PER 15 MIN (STAT)

## 2024-04-14 PROCEDURE — 63600175 PHARM REV CODE 636 W HCPCS: Performed by: STUDENT IN AN ORGANIZED HEALTH CARE EDUCATION/TRAINING PROGRAM

## 2024-04-14 PROCEDURE — 21400001 HC TELEMETRY ROOM

## 2024-04-14 PROCEDURE — 94761 N-INVAS EAR/PLS OXIMETRY MLT: CPT

## 2024-04-14 PROCEDURE — A4216 STERILE WATER/SALINE, 10 ML: HCPCS | Performed by: PHYSICIAN ASSISTANT

## 2024-04-14 PROCEDURE — 63600175 PHARM REV CODE 636 W HCPCS: Performed by: PHYSICIAN ASSISTANT

## 2024-04-14 PROCEDURE — 94640 AIRWAY INHALATION TREATMENT: CPT

## 2024-04-14 RX ORDER — CARVEDILOL 12.5 MG/1
12.5 TABLET ORAL 2 TIMES DAILY
Status: DISCONTINUED | OUTPATIENT
Start: 2024-04-14 | End: 2024-04-15 | Stop reason: HOSPADM

## 2024-04-14 RX ORDER — BENZONATATE 100 MG/1
100 CAPSULE ORAL 3 TIMES DAILY PRN
Status: DISCONTINUED | OUTPATIENT
Start: 2024-04-14 | End: 2024-04-15 | Stop reason: HOSPADM

## 2024-04-14 RX ADMIN — NIFEDIPINE 60 MG: 30 TABLET, FILM COATED, EXTENDED RELEASE ORAL at 09:04

## 2024-04-14 RX ADMIN — ATORVASTATIN CALCIUM 40 MG: 20 TABLET, FILM COATED ORAL at 09:04

## 2024-04-14 RX ADMIN — Medication 10 ML: at 10:04

## 2024-04-14 RX ADMIN — NIFEDIPINE 60 MG: 30 TABLET, FILM COATED, EXTENDED RELEASE ORAL at 08:04

## 2024-04-14 RX ADMIN — HEPARIN SODIUM 5000 UNITS: 5000 INJECTION INTRAVENOUS; SUBCUTANEOUS at 10:04

## 2024-04-14 RX ADMIN — ASPIRIN 81 MG CHEWABLE TABLET 81 MG: 81 TABLET CHEWABLE at 09:04

## 2024-04-14 RX ADMIN — BENZONATATE 100 MG: 100 CAPSULE ORAL at 08:04

## 2024-04-14 RX ADMIN — CIPROFLOXACIN 400 MG: 2 INJECTION, SOLUTION INTRAVENOUS at 01:04

## 2024-04-14 RX ADMIN — SENNOSIDES AND DOCUSATE SODIUM 1 TABLET: 8.6; 5 TABLET ORAL at 08:04

## 2024-04-14 RX ADMIN — Medication 10 ML: at 02:04

## 2024-04-14 RX ADMIN — LOSARTAN POTASSIUM 100 MG: 50 TABLET, FILM COATED ORAL at 09:04

## 2024-04-14 RX ADMIN — BENZONATATE 100 MG: 100 CAPSULE ORAL at 02:04

## 2024-04-14 RX ADMIN — CARVEDILOL 12.5 MG: 12.5 TABLET, FILM COATED ORAL at 09:04

## 2024-04-14 RX ADMIN — Medication 10 ML: at 05:04

## 2024-04-14 RX ADMIN — CARVEDILOL 12.5 MG: 12.5 TABLET, FILM COATED ORAL at 08:04

## 2024-04-14 RX ADMIN — HEPARIN SODIUM 5000 UNITS: 5000 INJECTION INTRAVENOUS; SUBCUTANEOUS at 01:04

## 2024-04-14 RX ADMIN — FLUTICASONE FUROATE AND VILANTEROL TRIFENATATE 1 PUFF: 100; 25 POWDER RESPIRATORY (INHALATION) at 07:04

## 2024-04-14 RX ADMIN — HEPARIN SODIUM 5000 UNITS: 5000 INJECTION INTRAVENOUS; SUBCUTANEOUS at 05:04

## 2024-04-14 RX ADMIN — CIPROFLOXACIN 400 MG: 2 INJECTION, SOLUTION INTRAVENOUS at 12:04

## 2024-04-14 NOTE — PROGRESS NOTES
[facility] Medicine  Progress Note    Patient Name: Dwayne Saravia  MRN: [id number]  Patient Class: IP- Inpatient   Admission Date: 4/9/2024  Length of Stay: 4 days  Attending Physician: Julio Ramirez MD  Primary Care Provider: Frieda Galvan MD        Subjective:     Principal Problem:Fall        HPI:  Mr. Dwayne Saravia is a 89 y.o. male, with PMH of HTN, COPD, CKD3b, GERD, antalgic gait & dysarthria & right hemiparesis s/p cerebral infarction, who presented to [facility] ED via EMS on 4/9/24 via EMS after his wife found him on the living room floor. It was unclear if he slid off of the cough or slipped and fell. His wife reported that he was in his normal state of health earlier today when his wife returned from work she found him on the ground at 5 pm, having fallen down. His wife and daughter assisted him up onto his walker. Once sitting, he seemed confused at home. He was alert earlier in the day, and was alert and oriented in the ED. He began to become confused once he arrived to the ED, he began to be confused, asking his daughter to help him up to a sitting position on his rolling scooter. After he was sitting, he appeared dazed and as if he was starting into the distance. He was eluated in the ED with labs showing leukocytosis of 22K, with left sfhit. H&H were normal at 13.7/40.7. He was treated in the ED with 1L NS and a dose of ciprofloxacin. He was placed on observation.     Overview/Hospital Course:  RICCI improving. CK only midly elevated but monitoring and hydrating with IV fluids.  Initial trop elevated but down trending. WBC still elevated with mild downtrend. Cont IV abx while awaiting urine cultures - gram neg rods, pan sensitive Ecoli. Completed 5 days course of cipro. Awaiting PT/O - [facility]. C spine not done in ED but on exam but has full ROM and no cervical neck pain. No FND other than dysarthria which is chronic from previous stroke. Pt stable for discharge, awaiting  placement.     Interval History: Pt with no complaints today. Enjoying breakfast     Review of Systems   Constitutional:  Negative for appetite change, chills, diaphoresis and fever.   Respiratory:  Negative for cough, shortness of breath and wheezing.    Cardiovascular:  Negative for chest pain and palpitations.   Gastrointestinal:  Negative for abdominal pain, constipation, diarrhea, nausea and vomiting.   Genitourinary:  Negative for decreased urine volume, difficulty urinating, dysuria, frequency, hematuria and urgency.   Neurological:  Negative for dizziness, syncope, weakness, light-headedness and headaches.   Hematological:  Does not bruise/bleed easily.   Psychiatric/Behavioral:  Negative for agitation and confusion.      Objective:     Vital Signs (Most Recent):  Temp: 97 °F (36.1 °C) (04/14/24 0814)  Pulse: 67 (04/14/24 0814)  Resp: 18 (04/14/24 0814)  BP: (!) 166/81 (04/14/24 0814)  SpO2: 99 % (04/14/24 0814) Vital Signs (24h Range):  Temp:  [97 °F (36.1 °C)-99 °F (37.2 °C)] 97 °F (36.1 °C)  Pulse:  [60-73] 67  Resp:  [16-18] 18  SpO2:  [92 %-99 %] 99 %  BP: (148-169)/(75-85) 166/81     Weight: 71.1 kg (156 lb 12 oz)  Body mass index is 23.83 kg/m².    Intake/Output Summary (Last 24 hours) at 4/14/2024 1033  Last data filed at 4/14/2024 0642  Gross per 24 hour   Intake 570 ml   Output 650 ml   Net -80 ml         Physical Exam  Constitutional:       General: He is not in acute distress.  Pulmonary:      Effort: No respiratory distress.      Breath sounds: No wheezing.   Abdominal:      General: There is no distension.      Tenderness: There is no abdominal tenderness.   Musculoskeletal:      Right lower leg: No edema.      Left lower leg: No edema.   Neurological:      Mental Status: He is oriented to person, place, and time.      Comments: No FND other than dysarthria              Significant Labs: All pertinent labs within the past 24 hours have been reviewed.    Significant Imaging: I have reviewed all  pertinent imaging results/findings within the past 24 hours.    Assessment/Plan:      * Fall  2/2 infection vs antalgic gait from previous stroke    CT brain with remote strokes   C spine not done in ED but on exam but has full ROM and no cervical neck pain. No FND other than dysarthria which is chronic from previous stroke.     Trop mildly elevated most likely in setting of RICCI and down trending. Denies chest pain.     CK mildly elevated     Plan  - PT/OT- [facility]   - Monitor CK - improving    - IVF - stopped after 48 hours            Acute cystitis  Leukocytosis - Resolved     UA pos for 3+ leukocytes  Pt denies sx of dysuria    Plan  - Urine cx - gram neg rods, pan sensitive Ecoli - complete 5 days of cipro   - Started on cipro in ED - continue     Acute renal failure superimposed on stage 3 chronic kidney disease  Suspect 2/2 dehydration  (less likely CK, only elevated to the 200's) - improving     Plan -   - cont IVF - stopped after 48 hours   - Monitor urine output and BMP     Essential hypertension  - continue home carvedilol and nifedipine  - initially started on losartan but we will hold losartan and hydrochlorothiazide in light of RICCI    Other emphysema  Does not appear to be in exacerbation  Continue daily steroid inhaler  DuoNebs p.r.n.    Gastroesophageal reflux disease without esophagitis        H/O: CVA (cerebrovascular accident)  Dysarthria following cerebral infarction     Plan -   - fall precautions   - aspiration precautions   - continue statin, ASA        VTE Risk Mitigation (From admission, onward)           Ordered     heparin (porcine) injection 5,000 Units  Every 8 hours         04/10/24 0226     IP VTE HIGH RISK PATIENT  Once         04/10/24 0226     Place sequential compression device  Until discontinued         04/10/24 0226                    Discharge Planning   HAILY:      Code Status: Full Code   Is the patient medically ready for discharge?:     Reason for patient still in hospital (select  all that apply): Treatment  Discharge Plan A: Skilled Nursing Facility   Discharge Delays: (!) Post-Acute Set-up              Julio Fletcher MD  Department of Hospital Medicine   [facility], [place], operated by [facility] - Med Surg ([place])

## 2024-04-14 NOTE — SUBJECTIVE & OBJECTIVE
Interval History: Pt with no complaints today. Enjoying breakfast     Review of Systems   Constitutional:  Negative for appetite change, chills, diaphoresis and fever.   Respiratory:  Negative for cough, shortness of breath and wheezing.    Cardiovascular:  Negative for chest pain and palpitations.   Gastrointestinal:  Negative for abdominal pain, constipation, diarrhea, nausea and vomiting.   Genitourinary:  Negative for decreased urine volume, difficulty urinating, dysuria, frequency, hematuria and urgency.   Neurological:  Negative for dizziness, syncope, weakness, light-headedness and headaches.   Hematological:  Does not bruise/bleed easily.   Psychiatric/Behavioral:  Negative for agitation and confusion.      Objective:     Vital Signs (Most Recent):  Temp: 97 °F (36.1 °C) (04/14/24 0814)  Pulse: 67 (04/14/24 0814)  Resp: 18 (04/14/24 0814)  BP: (!) 166/81 (04/14/24 0814)  SpO2: 99 % (04/14/24 0814) Vital Signs (24h Range):  Temp:  [97 °F (36.1 °C)-99 °F (37.2 °C)] 97 °F (36.1 °C)  Pulse:  [60-73] 67  Resp:  [16-18] 18  SpO2:  [92 %-99 %] 99 %  BP: (148-169)/(75-85) 166/81     Weight: 71.1 kg (156 lb 12 oz)  Body mass index is 23.83 kg/m².    Intake/Output Summary (Last 24 hours) at 4/14/2024 1033  Last data filed at 4/14/2024 0642  Gross per 24 hour   Intake 570 ml   Output 650 ml   Net -80 ml         Physical Exam  Constitutional:       General: He is not in acute distress.  Pulmonary:      Effort: No respiratory distress.      Breath sounds: No wheezing.   Abdominal:      General: There is no distension.      Tenderness: There is no abdominal tenderness.   Musculoskeletal:      Right lower leg: No edema.      Left lower leg: No edema.   Neurological:      Mental Status: He is oriented to person, place, and time.      Comments: No FND other than dysarthria              Significant Labs: All pertinent labs within the past 24 hours have been reviewed.    Significant Imaging: I have reviewed all pertinent  imaging results/findings within the past 24 hours.

## 2024-04-14 NOTE — PLAN OF CARE
Pt remained free of falls and injuries throughout shift. AAOx4. Pt calm, quiet, and cooperative. Purposeful hourly rounding performed. Pt swallows meds whole. Pt received IV Cipro as ordered, IV flushed and saline locked. Managed c/o cough with PRN Tessalon. Pt's abd firm and pt says he was feeling constipated, PRN stool softener given. Patient ambulates with X1 assist using walker. Urinal at bedside, some dribbling/incontinence noted. Incontinence care provided. Educated to call for assist ambulating, verbalized understanding. VSS on room air. Telemetry maintained, NSR. Pt sleeping in bed, even rise and fall of chest. Bed low and locked, bed alarm on, call light in reach. Side rails up x2. Will continue to monitor.

## 2024-04-14 NOTE — PLAN OF CARE
VSS on RA and afebrile this shift.  TB reading was negative.   Free from injury or skin breakdown; Fall precautions maintained and call light in reach.  POC updated questions answered and comments acknowledged.      Problem: Adult Inpatient Plan of Care  Goal: Plan of Care Review  Outcome: Ongoing, Not Progressing  Goal: Patient-Specific Goal (Individualized)  Outcome: Ongoing, Not Progressing  Goal: Absence of Hospital-Acquired Illness or Injury  Outcome: Ongoing, Not Progressing  Goal: Optimal Comfort and Wellbeing  Outcome: Ongoing, Not Progressing  Goal: Readiness for Transition of Care  Outcome: Ongoing, Not Progressing     Problem: Fluid and Electrolyte Imbalance (Acute Kidney Injury/Impairment)  Goal: Fluid and Electrolyte Balance  Outcome: Ongoing, Not Progressing     Problem: Oral Intake Inadequate (Acute Kidney Injury/Impairment)  Goal: Optimal Nutrition Intake  Outcome: Ongoing, Not Progressing     Problem: Renal Function Impairment (Acute Kidney Injury/Impairment)  Goal: Effective Renal Function  Outcome: Ongoing, Not Progressing     Problem: Fall Injury Risk  Goal: Absence of Fall and Fall-Related Injury  Outcome: Ongoing, Not Progressing     Problem: Adjustment to Illness COPD (Chronic Obstructive Pulmonary Disease)  Goal: Optimal Chronic Illness Coping  Outcome: Ongoing, Not Progressing     Problem: Functional Ability Impaired COPD (Chronic Obstructive Pulmonary Disease)  Goal: Optimal Level of Functional Lee  Outcome: Ongoing, Not Progressing     Problem: Respiratory Compromise COPD (Chronic Obstructive Pulmonary Disease)  Goal: Effective Oxygenation and Ventilation  Outcome: Ongoing, Not Progressing

## 2024-04-15 VITALS
RESPIRATION RATE: 18 BRPM | WEIGHT: 156.75 LBS | BODY MASS INDEX: 23.76 KG/M2 | DIASTOLIC BLOOD PRESSURE: 71 MMHG | HEIGHT: 68 IN | SYSTOLIC BLOOD PRESSURE: 142 MMHG | TEMPERATURE: 98 F | HEART RATE: 69 BPM | OXYGEN SATURATION: 95 %

## 2024-04-15 PROCEDURE — 97535 SELF CARE MNGMENT TRAINING: CPT

## 2024-04-15 PROCEDURE — 25000003 PHARM REV CODE 250: Performed by: PHYSICIAN ASSISTANT

## 2024-04-15 PROCEDURE — 94640 AIRWAY INHALATION TREATMENT: CPT

## 2024-04-15 PROCEDURE — 25000003 PHARM REV CODE 250: Performed by: STUDENT IN AN ORGANIZED HEALTH CARE EDUCATION/TRAINING PROGRAM

## 2024-04-15 PROCEDURE — 97116 GAIT TRAINING THERAPY: CPT | Mod: CQ

## 2024-04-15 PROCEDURE — 63600175 PHARM REV CODE 636 W HCPCS: Performed by: PHYSICIAN ASSISTANT

## 2024-04-15 PROCEDURE — A4216 STERILE WATER/SALINE, 10 ML: HCPCS | Performed by: PHYSICIAN ASSISTANT

## 2024-04-15 RX ORDER — CARVEDILOL 12.5 MG/1
12.5 TABLET ORAL 2 TIMES DAILY
Start: 2024-04-15 | End: 2025-04-15

## 2024-04-15 RX ADMIN — Medication 10 ML: at 05:04

## 2024-04-15 RX ADMIN — CARVEDILOL 12.5 MG: 12.5 TABLET, FILM COATED ORAL at 08:04

## 2024-04-15 RX ADMIN — NIFEDIPINE 60 MG: 30 TABLET, FILM COATED, EXTENDED RELEASE ORAL at 08:04

## 2024-04-15 RX ADMIN — LOSARTAN POTASSIUM 100 MG: 50 TABLET, FILM COATED ORAL at 08:04

## 2024-04-15 RX ADMIN — ATORVASTATIN CALCIUM 40 MG: 20 TABLET, FILM COATED ORAL at 08:04

## 2024-04-15 RX ADMIN — BENZONATATE 100 MG: 100 CAPSULE ORAL at 05:04

## 2024-04-15 RX ADMIN — ASPIRIN 81 MG CHEWABLE TABLET 81 MG: 81 TABLET CHEWABLE at 08:04

## 2024-04-15 RX ADMIN — FLUTICASONE FUROATE AND VILANTEROL TRIFENATATE 1 PUFF: 100; 25 POWDER RESPIRATORY (INHALATION) at 08:04

## 2024-04-15 RX ADMIN — HEPARIN SODIUM 5000 UNITS: 5000 INJECTION INTRAVENOUS; SUBCUTANEOUS at 05:04

## 2024-04-15 NOTE — CARE UPDATE
04/15/24 0852   Patient Assessment/Suction   Level of Consciousness (AVPU) alert   Respiratory Effort Normal;Unlabored   Expansion/Accessory Muscles/Retractions no retractions;no use of accessory muscles   All Lung Fields Breath Sounds clear;equal bilaterally   PRE-TX-O2   SpO2 95 %   Pulse 63   Resp 18   Inhaler   $ Inhaler Charges MDI (Metered Dose Inahler) Treatment;Mouth rinsed post treatment   Daily Review of Necessity (Inhaler) completed   Respiratory Treatment Status (Inhaler) given   Treatment Route (Inhaler) mouthpiece   Patient Position (Inhaler) sitting on edge of bed   Post Treatment Assessment (Inhaler) breath sounds unchanged   Signs of Intolerance (Inhaler) none

## 2024-04-15 NOTE — PLAN OF CARE
Medicare Message     Important Message from Medicare regarding Discharge Appeal Rights -- Given to patient/caregiver; Explained to patient/caregiver; Signed/date by patient/caregiver Given to patient/caregiver; Explained to patient/caregiver; Signed/date by patient/caregiver   Date IMM was signed -- 4/11/2024 4/15/2024   Time IMM was signed -- [phone]

## 2024-04-15 NOTE — PLAN OF CARE
Patient will discharge to [facility]. Patient will transport via  van. Appointments scheduled and added to AVS. All CM needs have been met.    04/15/24 1359   Final Note   Assessment Type Final Discharge Note   Anticipated Discharge Disposition SNF   Hospital Resources/Appts/Education Provided Provided patient/caregiver with written discharge plan information;Appointments scheduled and added to AVS   Post-Acute Status   Post-Acute Placement Status Set-up Complete/Auth obtained   Discharge Delays None known at this time     [facility] - [facility] ([place])  Discharge Final Note    Primary Care Provider: Frieda Galvan MD    Expected Discharge Date: 4/15/2024    Final Discharge Note (most recent)       Final Note - 04/15/24 1359          Final Note    Assessment Type Final Discharge Note (P)      Anticipated Discharge Disposition Skilled Nursing Facility (P)      Hospital Resources/Appts/Education Provided Provided patient/caregiver with written discharge plan information;Appointments scheduled and added to AVS (P)         Post-Acute Status    Post-Acute Placement Status Set-up Complete/Auth obtained (P)      Discharge Delays None known at this time (P)                      Important Message from Medicare  Important Message from Medicare regarding Discharge Appeal Rights: Given to patient/caregiver, Explained to patient/caregiver, Signed/date by patient/caregiver     Date IMM was signed: 04/15/24  Time IMM was signed: 0858    Contact Info       Frieda Galvan MD   Specialty: Internal Medicine   Relationship: PCP - General    [address]  [facility] [address]   Phone: [phone]       Next Steps: Follow up

## 2024-04-15 NOTE — PLAN OF CARE
04/15/24 1012   Post-Acute Status   Post-Acute Authorization Placement   Post-Acute Placement Status Pending payor review/awaiting authorization (if required)   Patient choice form signed by patient/caregiver List with quality metrics by geographic area provided;List from CMS Compare;List from System Post-Acute Care   Discharge Delays (!) Post-Acute Set-up   Discharge Plan   Discharge Plan A Skilled Nursing Facility     [facility] is working on single case agreement since patient has been denied by all SNFs in network with insurance.

## 2024-04-15 NOTE — DISCHARGE SUMMARY
[facility] Medicine  Discharge Summary      Patient Name: Dwayne Saravia  MRN: [id number]  [facility]: [id number]  Patient Class: IP- Inpatient  Admission Date: 4/9/2024  Hospital Length of Stay: 5 days  Discharge Date and Time:  04/15/2024 3:19 PM  Attending Physician: Julio Ramirez MD   Discharging Provider: Julio Fletcher MD  Primary Care Provider: Frieda Galvan MD    Primary Care Team: Networked reference to record PCT     HPI:   Mr. Dwayne Saravia is a 89 y.o. male, with PMH of HTN, COPD, CKD3b, GERD, antalgic gait & dysarthria & right hemiparesis s/p cerebral infarction, who presented to [facility] ED via EMS on 4/9/24 via EMS after his wife found him on the living room floor. It was unclear if he slid off of the cough or slipped and fell. His wife reported that he was in his normal state of health earlier today when his wife returned from work she found him on the ground at 5 pm, having fallen down. His wife and daughter assisted him up onto his walker. Once sitting, he seemed confused at home. He was alert earlier in the day, and was alert and oriented in the ED. He began to become confused once he arrived to the ED, he began to be confused, asking his daughter to help him up to a sitting position on his rolling scooter. After he was sitting, he appeared dazed and as if he was starting into the distance. He was eluated in the ED with labs showing leukocytosis of 22K, with left sfhit. H&H were normal at 13.7/40.7. He was treated in the ED with 1L NS and a dose of ciprofloxacin. He was placed on observation.         Hospital Course:   C spine not done in ED but on exam but has full ROM and no cervical neck pain. No FND other than dysarthria which is chronic from previous stroke.Continued IV fluids and IV antibiotics.  CK only midly elevated but monitored and down trended.  RICCI and leukocytosis resolved.  Urine cultures growing pansensitive E coli and patient completed 5 day course  of ciprofloxacin. Seen by PT/OT - [facility].  Pt stable for discharge to facility.  Hydrochlorothiazide stopped to decrease chance of dehydration and carvedilol dose increased.     Goals of Care Treatment Preferences:  Code Status: Full Code          What is most important right now is to focus on extending life as long as possible, even it it means sacrificing quality.  Accordingly, we have decided that the best plan to meet the patient's goals includes continuing with treatment.          Final Active Diagnoses:    Diagnosis Date Noted POA    PRINCIPAL PROBLEM:  Fall [W19.XXXA] 04/10/2024 Yes    Acute cystitis [N30.00] 04/10/2024 Yes    Acute renal failure superimposed on stage 3 chronic kidney disease [N17.9, N18.30] 04/10/2024 Yes    Essential hypertension [I10] 11/17/2023 Yes    Other emphysema [J43.8] 08/05/2019 Yes    H/O: CVA (cerebrovascular accident) [Z86.73] 07/31/2019 Not Applicable      Problems Resolved During this Admission:    Diagnosis Date Noted Date Resolved POA    Sepsis [A41.9] 04/10/2024 04/10/2024 Yes    Dysarthria following cerebral infarction [I69.322] 08/29/2019 04/10/2024 Not Applicable       Discharged Condition: good    Disposition: Home or Self Care    Follow Up:   Follow-up Information       Frieda Galvan MD Follow up.    Specialty: Internal Medicine  Contact information:  [address] [place] [facility] [address]  [phone]                           Medications:  Reconciled Home Medications:      Medication List        CHANGE how you take these medications      carvediloL 12.5 MG tablet  Commonly known as: COREG  Take 1 tablet (12.5 mg total) by mouth 2 (two) times daily.  What changed:   medication strength  how much to take            CONTINUE taking these medications      AREXVY (PF) 120 mcg/0.5 mL Susr vaccine  Generic drug: RSVPreF3 antigen-AS01E (PF)  Inject into the muscle.     aspirin 81 MG Chew  Take 81 mg by mouth once daily.     atorvastatin 40 MG tablet  Commonly  known as: LIPITOR  Take 40 mg by mouth once daily.     BREO ELLIPTA 100-25 mcg/dose diskus inhaler  Generic drug: fluticasone furoate-vilanteroL  Inhale 1 puff into the lungs once daily. Controller     CombiVENT RESPIMAT  mcg/actuation inhaler  Generic drug: ipratropium-albuteroL  Inhale 1 puff into the lungs every 6 (six) hours as needed for Wheezing or Shortness of Breath. Rescue     docusate sodium 100 MG capsule  Commonly known as: COLACE  Take 1 capsule (100 mg total) by mouth Daily.     levocetirizine 5 MG tablet  Commonly known as: XYZAL  Take 5 mg by mouth every evening.     losartan 100 MG tablet  Commonly known as: COZAAR  Take 100 mg by mouth.     NIFEdipine 60 MG (OSM) 24 hr tablet  Commonly known as: PROCARDIA-XL  Take 1 tablet (60 mg total) by mouth 2 (two) times a day.     omega-3 acid ethyl esters 1 gram capsule  Commonly known as: LOVAZA  Take 2 g by mouth.     simvastatin 40 MG tablet  Commonly known as: ZOCOR  TAKE 1 TABLET EVERY DAY     vitamin D 1000 units Tab  Commonly known as: VITAMIN D3  Take 1,000 Units by mouth.            STOP taking these medications      hydroCHLOROthiazide 12.5 MG Tab  Commonly known as: HYDRODIURIL                Time spent on the discharge of patient: 35 minutes         Julio Fletcher MD  Department of Hospital Medicine  [facility] ([place])

## 2024-04-15 NOTE — PT/OT/SLP PROGRESS
Physical Therapy Treatment    Patient Name:  Dwayne Saravia   MRN:  [id number]    Recommendations:     Discharge Recommendations: Moderate Intensity Therapy  Discharge Equipment Recommendations: to be determined by next level of care  Barriers to discharge: Decreased caregiver support    Assessment:     Dwayne Saravia is a 89 y.o. male admitted with a medical diagnosis of Fall.  He presents with the following impairments/functional limitations: weakness, impaired endurance, impaired self care skills, impaired functional mobility, gait instability, impaired balance, decreased lower extremity function, decreased upper extremity function, decreased safety awareness, decreased ROM, impaired cardiopulmonary response to activity ; pt with fair mobility today, req'd mod/maxA to stand from bed, amb'd short dist in hallway w/ rollator (pt's own) and Hira, needing lots of cueing for safety.    Rehab Prognosis: Good; patient would benefit from acute skilled PT services to address these deficits and reach maximum level of function.    Recent Surgery: * No surgery found *      Plan:     During this hospitalization, patient to be seen 5 x/week to address the identified rehab impairments via gait training, therapeutic activities, therapeutic exercises, neuromuscular re-education and progress toward the following goals:    Plan of Care Expires:  05/10/24    Subjective     Chief Complaint: none stated  Patient/Family Comments/goals: pt agreeable to session  Pain/Comfort:  Pain Rating 1: 0/10  Pain Rating Post-Intervention 1: 0/10      Objective:     Communicated with nurse prior to session.  Patient found HOB elevated with peripheral IV, telemetry, Condom Catheter upon PT entry to room.     General Precautions: Standard, fall, aphasia, respiratory  Orthopedic Precautions: N/A  Braces: N/A  Respiratory Status: Room air     Functional Mobility:  Bed Mobility:     Supine to Sit: minimum assistance  Transfers:     Sit to Stand:  moderate  assistance and maximal assistance with 4 wheeled walker  Gait: amb'd ~40' w/ rollator and Hira, cueing for safety      AM-PAC 6 CLICK MOBILITY  Turning over in bed (including adjusting bedclothes, sheets and blankets)?: 3  Sitting down on and standing up from a chair with arms (e.g., wheelchair, bedside commode, etc.): 2  Moving from lying on back to sitting on the side of the bed?: 3  Moving to and from a bed to a chair (including a wheelchair)?: 3  Need to walk in hospital room?: 3  Climbing 3-5 steps with a railing?: 2  Basic Mobility Total Score: 16       Treatment & Education:      Patient left sitting edge of bed with  nursing and transport present, pt going to SNF.    GOALS:   Multidisciplinary Problems       Physical Therapy Goals          Problem: Physical Therapy    Goal Priority Disciplines Outcome Goal Variances Interventions   Physical Therapy Goal     PT, PT/OT Ongoing, Progressing     Description: Goals to be met by: 5/10/24    Patient will increase functional independence with mobility by performin. Supine<>sit with supervision without use of HB features.   2. Sit<>stand with supervision with rollator.  3. Gait x 50 feet with supervision with rollator.  4. Ascend/descend 4 step(s) with least restrictive assistive device and uni HR with CGA.                        Time Tracking:     PT Received On: 04/15/24  PT Start Time:      PT Stop Time: 1459  PT Total Time (min): 10 min     Billable Minutes: Gait Training 10    Treatment Type: Treatment  PT/PTA: PTA     Number of PTA visits since last PT visit: 1     04/15/2024

## 2024-04-15 NOTE — PLAN OF CARE
NURSING HOME ORDERS    04/15/2024  [facility] LOCATION ([facility])  [facility] ([facility])  [address]  [place] [address]  Dept: [phone]  Loc: [phone]     Admit to Nursing Home:      Diagnoses:  Active Hospital Problems    Diagnosis  POA    *Fall [W19.XXXA]  Yes    Acute cystitis [N30.00]  Yes     Priority: 2     Acute renal failure superimposed on stage 3 chronic kidney disease [N17.9, N18.30]  Yes     Priority: 3     Essential hypertension [I10]  Yes    Other emphysema [J43.8]  Yes     Diffuse emphysema noted on imaging.  Hospitalized a few months ago concern for COPD exacerbation and he was started on home oxygen as well as inhalers.  After he returned home, he did not use the inhalers and has not been using his oxygen and says that he feels well with no shortness of breath and sees no need to use them.    At rest his oxygenation is 90% with exertion it drops to 80 however he does recover quickly and says he has no intent on using his home oxygen because he feels great.      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  Accordingly, we have decided that the best plan to meet the patient's goals includes continuing with treatment.          Consults:   PT to evaluate and treat and OT to evaluate and treat                               Medications:  Discontinue all previous medication orders, if any. See new list below.     Medication List        CHANGE how you take these medications      carvediloL 12.5 MG tablet  Commonly known as: COREG  Take 1 tablet (12.5 mg total) by mouth 2 (two) times daily.  What changed:   medication strength  how much to take            CONTINUE taking these medications      AREXVY (PF) 120 mcg/0.5 mL Susr vaccine  Generic drug: RSVPreF3 antigen-AS01E (PF)  Inject into the muscle.     aspirin 81 MG Chew  Take 81 mg by mouth once daily.     atorvastatin 40 MG tablet  Commonly known as: LIPITOR  Take 40 mg by mouth once daily.     BREO ELLIPTA 100-25 mcg/dose diskus inhaler  Generic drug: fluticasone furoate-vilanteroL  Inhale 1 puff into the lungs once daily. Controller     CombiVENT RESPIMAT  mcg/actuation inhaler  Generic drug: ipratropium-albuteroL  Inhale 1 puff into the lungs every 6 (six) hours as needed for Wheezing or Shortness of Breath. Rescue     docusate sodium 100 MG capsule  Commonly known as: COLACE  Take 1 capsule (100 mg total) by mouth Daily.     levocetirizine 5 MG tablet  Commonly known as: XYZAL  Take 5 mg by mouth every evening.     losartan 100 MG tablet  Commonly known as: COZAAR  Take 100 mg by mouth.     NIFEdipine 60 MG (OSM) 24 hr tablet  Commonly known as: PROCARDIA-XL  Take 1 tablet (60 mg total) by mouth 2 (two) times a day.     omega-3 acid ethyl esters 1 gram capsule  Commonly known as: LOVAZA  Take 2 g by mouth.     simvastatin 40 MG tablet  Commonly known as: ZOCOR  TAKE 1 TABLET EVERY DAY     vitamin D 1000 units Tab  Commonly known as: VITAMIN D3  Take 1,000 Units by mouth.            STOP taking these medications      hydroCHLOROthiazide 12.5 MG Tab  Commonly known as: HYDRODIURIL                Immunizations Administered as of 4/15/2024       Name Date Dose VIS Date Route Exp Date    COVID-19, MRNA, LN-S, PF (Moderna) 2/19/2021 0.5 mL -- Intramuscular --    Site: Left arm     :  [employer]     Lot: [id number]     Comment: Adminis     COVID-19, MRNA, LN-S, PF (Moderna) 1/22/2021 0.5 mL -- Intramuscular --    Site: Left arm     : [employer]     Lot: [id number]     Comment: Adminis                 _________________________________  Julio Fletcher MD  04/15/2024

## 2024-04-17 ENCOUNTER — PATIENT OUTREACH (OUTPATIENT)
Dept: ADMINISTRATIVE | Facility: CLINIC | Age: 89
End: 2024-04-17
Payer: MEDICARE

## 2024-05-17 ENCOUNTER — TELEPHONE (OUTPATIENT)
Dept: PRIMARY CARE CLINIC | Facility: CLINIC | Age: 89
End: 2024-05-17
Payer: MEDICARE

## 2024-05-17 NOTE — TELEPHONE ENCOUNTER
Left a voicemail for the patient. Returning the patient's call. Calling to schedule an appointment to follow-up to receive medication refills.    INTERVAL HPI/OVERNIGHT EVENTS: Remains afebrile, no pain;     CONSTITUTIONAL:  Negative fever or chills, feels well, good appetite  EYES:  Negative  blurry vision or double vision  CARDIOVASCULAR:  Negative for chest pain or palpitations  RESPIRATORY:  Negative for cough, wheezing, or SOB   GASTROINTESTINAL:  Negative for nausea, vomiting, diarrhea, constipation, or abdominal pain  GENITOURINARY:  Negative frequency, urgency or dysuria  NEUROLOGIC:  No headache, confusion, dizziness, lightheadedness      ANTIBIOTICS/RELEVANT:    MEDICATIONS  (STANDING):  chlorhexidine 4% Liquid 1 Application(s) Topical <User Schedule>  collagenase Ointment 1 Application(s) Topical daily  dextrose 5%. 1000 milliLiter(s) (50 mL/Hr) IV Continuous <Continuous>  dextrose 50% Injectable 12.5 Gram(s) IV Push once  dextrose 50% Injectable 25 Gram(s) IV Push once  dextrose 50% Injectable 25 Gram(s) IV Push once  digoxin     Tablet 0.125 milliGRAM(s) Oral daily  DULoxetine 90 milliGRAM(s) Oral daily  enoxaparin Injectable 40 milliGRAM(s) SubCutaneous daily  furosemide    Tablet 40 milliGRAM(s) Oral daily  metolazone 2.5 milliGRAM(s) Oral daily  metoprolol succinate  milliGRAM(s) Oral daily  pantoprazole    Tablet 40 milliGRAM(s) Oral before breakfast  polyethylene glycol 3350 17 Gram(s) Oral daily  rifAMPin 300 milliGRAM(s) Oral two times a day  rosuvastatin 40 milliGRAM(s) Oral at bedtime    MEDICATIONS  (PRN):  acetaminophen   Tablet .. 650 milliGRAM(s) Oral every 6 hours PRN Temp greater or equal to 38C (100.4F), Mild Pain (1 - 3)  aluminum hydroxide/magnesium hydroxide/simethicone Suspension 30 milliLiter(s) Oral four times a day PRN Indigestion  bisacodyl Suppository 10 milliGRAM(s) Rectal daily PRN If no bowel movement by postoperative day #2  dextrose 40% Gel 15 Gram(s) Oral once PRN Blood Glucose LESS THAN 70 milliGRAM(s)/deciliter  glucagon  Injectable 1 milliGRAM(s) IntraMuscular once PRN Glucose LESS THAN 70 milligrams/deciliter  HYDROmorphone  Injectable 0.5 milliGRAM(s) IV Push every 4 hours PRN Severe Pain (7 - 10)  magnesium hydroxide Suspension 30 milliLiter(s) Oral daily PRN Constipation  ondansetron Injectable 4 milliGRAM(s) IV Push every 6 hours PRN Nausea and/or Vomiting  oxyCODONE    IR 5 milliGRAM(s) Oral every 4 hours PRN Moderate Pain (4 - 6)  oxyCODONE    IR 10 milliGRAM(s) Oral every 4 hours PRN Severe Pain (7 - 10)  senna 2 Tablet(s) Oral at bedtime PRN Constipation  sodium chloride 0.9% lock flush 10 milliLiter(s) IV Push every 1 hour PRN Pre/post blood products, medications, blood draw, and to maintain line patency        Vital Signs Last 24 Hrs  T(C): 36.3 (24 Dec 2019 08:32), Max: 36.4 (23 Dec 2019 20:50)  T(F): 97.4 (24 Dec 2019 08:32), Max: 97.6 (23 Dec 2019 20:50)  HR: 87 (24 Dec 2019 16:45) (83 - 89)  BP: 113/67 (24 Dec 2019 16:45) (112/59 - 125/73)  BP(mean): --  RR: 18 (24 Dec 2019 16:45) (18 - 198)  SpO2: 96% (24 Dec 2019 16:45) (95% - 96%)    12-23-19 @ 07:01  -  12-24-19 @ 07:00  --------------------------------------------------------  IN: 750 mL / OUT: 561 mL / NET: 189 mL    12-24-19 @ 07:01  -  12-24-19 @ 18:26  --------------------------------------------------------  IN: 0 mL / OUT: 200 mL / NET: -200 mL      PHYSICAL EXAM:  Constitutional:Well-developed, well nourished  Eyes:DAMARIS, EOMI  Ear/Nose/Throat: no oral lesion, no sinus tenderness on percussion	  Neck:no JVD, no lymphadenopathy, supple  Respiratory: CTA colton  Cardiovascular: S1S2 RRR, no murmurs  Gastrointestinal:soft, (+) BS, no HSM  Extremities:  long leg cast RLE C/D/I with gauze dressing C/D/I  Pulses:  wwp, brisk cp refill  Sensation:  SILT throughout distal le b/l and symmetrically  Motor: EHL/FHL/TA/GS 5/5 throughout le b/l  LABS:  Sedimentation Rate, Erythrocyte (12.24.19 @ 06:33)    Sedimentation Rate, Erythrocyte: 12 mm/Hr    C-Reactive Protein, Serum (12.24.19 @ 06:33)    C-Reactive Protein, Serum: 4.71 mg/dL                            10.8   7.98  )-----------( 319      ( 24 Dec 2019 06:33 )             36.4     12-24    137  |  98  |  33<H>  ----------------------------<  172<H>  3.8   |  22  |  1.12    Ca    8.6      24 Dec 2019 06:33      MICROBIOLOGY:  Culture - Blood (12.21.19 @ 23:15)    Specimen Source: .Blood Blood-Venous    Culture Results:   No growth at 2 days.        RADIOLOGY & ADDITIONAL STUDIES:

## 2024-05-17 NOTE — TELEPHONE ENCOUNTER
----- Message from XANDERKaleb Merrill sent at 5/17/2024 12:16 PM CDT -----  Contact: Self [phone]  Requesting an RX refill or new RX.    Is this a refill or new RX: refill    RX name and strength (copy/paste from chart): carvedilol (COREG) 12.5 MG tablet , omega-3 acid ethyl esters (LOVAZA) 1 gram capsule,atorvastatin (LIPITOR) 40 MG tablet,simvastatin (ZOCOR) 40 MG tablet,losartan (COZAAR) 100 MG tabletfluticasone furoate-vilanteroL (BREO ELLIPTA) 100-25 mcg/dose diskus inhaler     Is this a 30 day or 90 day RX: 30    Pharmacy name and phone # (copy/paste from chart):      [facility]  [address] [place] [id number]  Phone: [phone] Fax: [phone]

## 2024-05-24 ENCOUNTER — OFFICE VISIT (OUTPATIENT)
Dept: PRIMARY CARE CLINIC | Facility: CLINIC | Age: 89
End: 2024-05-24
Payer: MEDICARE

## 2024-05-24 ENCOUNTER — TELEPHONE (OUTPATIENT)
Dept: PRIMARY CARE CLINIC | Facility: CLINIC | Age: 89
End: 2024-05-24

## 2024-05-24 VITALS
BODY MASS INDEX: 22.62 KG/M2 | OXYGEN SATURATION: 97 % | HEIGHT: 68 IN | SYSTOLIC BLOOD PRESSURE: 98 MMHG | HEART RATE: 56 BPM | WEIGHT: 149.25 LBS | DIASTOLIC BLOOD PRESSURE: 60 MMHG

## 2024-05-24 DIAGNOSIS — R53.81 DEBILITY: ICD-10-CM

## 2024-05-24 DIAGNOSIS — R26.2 DIFFICULTY WALKING: Primary | ICD-10-CM

## 2024-05-24 DIAGNOSIS — I50.32 CHRONIC DIASTOLIC HEART FAILURE: ICD-10-CM

## 2024-05-24 DIAGNOSIS — I10 PRIMARY HYPERTENSION: ICD-10-CM

## 2024-05-24 DIAGNOSIS — N18.32 CKD STAGE G3B/A2, GFR 30-44 AND ALBUMIN CREATININE RATIO 30-299 MG/G: ICD-10-CM

## 2024-05-24 DIAGNOSIS — J44.9 CHRONIC OBSTRUCTIVE PULMONARY DISEASE, UNSPECIFIED COPD TYPE: ICD-10-CM

## 2024-05-24 DIAGNOSIS — G63 POLYNEUROPATHY IN DISEASES CLASSIFIED ELSEWHERE: ICD-10-CM

## 2024-05-24 DIAGNOSIS — I69.354 HEMIPARESIS AFFECTING LEFT SIDE AS LATE EFFECT OF STROKE: ICD-10-CM

## 2024-05-24 DIAGNOSIS — I73.9 PERIPHERAL VASCULAR DISEASE, UNSPECIFIED: ICD-10-CM

## 2024-05-24 PROBLEM — E21.3 HYPERPARATHYROIDISM, UNSPECIFIED: Status: RESOLVED | Noted: 2024-04-10 | Resolved: 2024-05-24

## 2024-05-24 PROCEDURE — 1160F RVW MEDS BY RX/DR IN RCRD: CPT | Mod: HCNC,CPTII,S$GLB, | Performed by: STUDENT IN AN ORGANIZED HEALTH CARE EDUCATION/TRAINING PROGRAM

## 2024-05-24 PROCEDURE — 99214 OFFICE O/P EST MOD 30 MIN: CPT | Mod: HCNC,S$GLB,, | Performed by: STUDENT IN AN ORGANIZED HEALTH CARE EDUCATION/TRAINING PROGRAM

## 2024-05-24 PROCEDURE — 1126F AMNT PAIN NOTED NONE PRSNT: CPT | Mod: HCNC,CPTII,S$GLB, | Performed by: STUDENT IN AN ORGANIZED HEALTH CARE EDUCATION/TRAINING PROGRAM

## 2024-05-24 PROCEDURE — 1159F MED LIST DOCD IN RCRD: CPT | Mod: HCNC,CPTII,S$GLB, | Performed by: STUDENT IN AN ORGANIZED HEALTH CARE EDUCATION/TRAINING PROGRAM

## 2024-05-24 PROCEDURE — 99999 PR PBB SHADOW E&M-EST. PATIENT-LVL IV: CPT | Mod: PBBFAC,HCNC,, | Performed by: STUDENT IN AN ORGANIZED HEALTH CARE EDUCATION/TRAINING PROGRAM

## 2024-05-24 RX ORDER — LOSARTAN POTASSIUM 50 MG/1
50 TABLET ORAL DAILY
Qty: 90 TABLET | Refills: 3 | Status: SHIPPED | OUTPATIENT
Start: 2024-05-24 | End: 2025-05-24

## 2024-05-24 RX ORDER — FLUTICASONE FUROATE AND VILANTEROL 100; 25 UG/1; UG/1
1 POWDER RESPIRATORY (INHALATION) DAILY
Qty: 60 EACH | Refills: 0 | Status: SHIPPED | OUTPATIENT
Start: 2024-05-24

## 2024-05-24 NOTE — TELEPHONE ENCOUNTER
SHELLIM for Pt wife to call office back about pt Home health Order  Spoke With Shirley fax over  order [phone]

## 2024-05-24 NOTE — PROGRESS NOTES
Office visit  Patient: Mr. Dwayne Saravia   5/24/2024     Assessment:     1. Difficulty walking    2. Hemiparesis affecting left side as late effect of stroke    3. CKD stage G3b/A2, GFR 30-44 and albumin creatinine ratio  mg/g    4. Chronic diastolic heart failure    5. Peripheral vascular disease, unspecified    6. Polyneuropathy in diseases classified elsewhere    7. Primary hypertension    8. Debility    9. Chronic obstructive pulmonary disease, unspecified COPD type      Plan:       1. Difficulty walking  -     Ambulatory referral/consult to Home Health; Future; Expected date: 05/25/2024        -patient's wife to contact insurance regarding form necessary for motor scooter evaluation    2. Hemiparesis affecting left side as late effect of stroke        -stable, makes ambulating difficult    3. CKD stage G3b/A2, GFR 30-44 and albumin creatinine ratio  mg/g       -stable, avoid nephrotoxins    4. Chronic diastolic heart failure       -stable, continue current medications    5. Peripheral vascular disease, unspecified       -stable, continue to monitor    6. Polyneuropathy in diseases classified elsewhere      -stable, currently not on any medications for this    7. Primary hypertension  -     losartan (COZAAR) 50 MG tablet; Take 1 tablet (50 mg total) by mouth once daily.  Dispense: 90 tablet; Refill: 3        -blood pressure low in office today; will decrease losartan from 100 mg to 50 mg       -continue nifedipine for now    8. Debility  -     Ambulatory referral/consult to Home Health; Future; Expected date: 05/25/2024         9. Chronic obstructive pulmonary disease, unspecified COPD type  -     fluticasone furoate-vilanteroL (BREO ELLIPTA) 100-25 mcg/dose diskus inhaler; Inhale 1 puff into the lungs once daily. Controller  Dispense: 60 each; Refill: 0      Return to clinic in 1 month for hypertension follow up or sooner as needed.          CHIEF COMPLAINT: hospital follow up    HPI: Dwayne  "Rani is a 89 y.o. male who presents for hospital follow up.  He was recently hospitalized for acute confusion and found to have a UTI. He was discharged to rehab. Then, he was discharged from rehab with the promise of home health, but patient's wife reports no one has contacted her regarding home health. He reports he's doing well since coming home from the hospital. He denies dysuria or hematuria. He does report frequency, but that has been ongoing.    The rehab facility wanted to send him home with a wheelchair, but patient's wife reports she doesn't have room for a wheelchair.  They report that they need a prescription for a scooter.          Current Outpatient Medications   Medication Instructions    aspirin 81 mg, Daily    atorvastatin (LIPITOR) 40 mg, Oral, Daily    carvediloL (COREG) 12.5 mg, Oral, 2 times daily    docusate sodium (COLACE) 100 mg, Oral, Daily    fluticasone furoate-vilanteroL (BREO ELLIPTA) 100-25 mcg/dose diskus inhaler 1 puff, Inhalation, Daily, Controller    ipratropium-albuteroL (COMBIVENT)  mcg/actuation inhaler 1 puff, Inhalation, Every 6 hours PRN, Rescue    levocetirizine (XYZAL) 5 mg, Oral, Nightly    losartan (COZAAR) 50 mg, Oral, Daily    NIFEdipine (PROCARDIA-XL) 60 mg, Oral, 2 times daily    omega-3 acid ethyl esters (LOVAZA) 2 g    RSVPreF3 antigen-AS01E, PF, (AREXVY, PF,) 120 mcg/0.5 mL SusR vaccine Intramuscular    simvastatin (ZOCOR) 40 MG tablet TAKE 1 TABLET EVERY DAY    vitamin D (VITAMIN D3) 1,000 Units, Oral       Lab Results   Component Value Date    HGBA1C 5.7 (H) 10/24/2023    HGBA1C 6.1 (H) 08/28/2020     No results found for: "MICALBCREAT"  Lab Results   Component Value Date    LDLCALC 61.6 (L) 05/10/2021    LDLCALC 63.2 08/28/2020    CHOL 122 05/10/2021    HDL 41 05/10/2021    TRIG 97 05/10/2021       Lab Results   Component Value Date     04/13/2024    K 4.0 04/13/2024     04/13/2024    CO2 22 (L) 04/13/2024    GLU 97 04/13/2024    BUN 20 " "04/13/2024    CREATININE 1.8 (H) 04/13/2024    CALCIUM 8.7 04/13/2024    PROT 6.5 04/10/2024    ALBUMIN 2.8 (L) 04/10/2024    BILITOT 1.1 (H) 04/10/2024    ALKPHOS 87 04/10/2024    AST 14 04/10/2024    ALT 9 (L) 04/10/2024    ANIONGAP 7 (L) 04/13/2024    ESTGFRAFRICA 57.1 (A) 05/10/2021    EGFRNONAA 49.4 (A) 05/10/2021    WBC 8.89 04/13/2024    HGB 11.8 (L) 04/13/2024    HGB 11.4 (L) 04/12/2024    HCT 35.8 (L) 04/13/2024    MCV 94 04/13/2024     04/13/2024    TSH 1.124 04/10/2024    PSA 22 (H) 11/19/2008    HEPCAB Negative 10/23/2023       Lab Results   Component Value Date    JXTJMKXU28 698 11/20/2008    FERRITIN 462 (H) 11/20/2008    IRON 101 11/20/2008    TIBC 291 11/20/2008    FESATURATED 35 11/20/2008         Past Medical History:   Diagnosis Date    Arthritis     BPH (benign prostatic hyperplasia)     CVA (cerebral vascular accident) 2007    Hypertension      Past Surgical History:   Procedure Laterality Date    PROSTATE SURGERY      2016     Vitals:    05/24/24 1042   BP: 98/60   Pulse: (!) 56   SpO2: 97%   Weight: 67.7 kg (149 lb 4 oz)   Height: 5' 8" (1.727 m)   PainSc: 0-No pain     Objective:   Physical Exam  Constitutional:       General: He is not in acute distress.     Appearance: Normal appearance. He is well-developed.   HENT:      Head: Normocephalic and atraumatic.      Right Ear: Hearing and external ear normal. No decreased hearing noted. No drainage or swelling.      Left Ear: Hearing and external ear normal. No decreased hearing noted. No drainage or swelling.      Nose: Nose normal. No rhinorrhea.      Mouth/Throat:      Mouth: Mucous membranes are moist.   Eyes:      General: Lids are normal.         Right eye: No discharge.         Left eye: No discharge.      Conjunctiva/sclera: Conjunctivae normal.      Right eye: Right conjunctiva is not injected. No exudate.     Left eye: Left conjunctiva is not injected. No exudate.  Cardiovascular:      Rate and Rhythm: Normal rate and regular " rhythm.      Heart sounds: Normal heart sounds. No murmur heard.     No friction rub. No gallop.   Pulmonary:      Effort: Pulmonary effort is normal. No respiratory distress.      Breath sounds: No stridor. No wheezing, rhonchi or rales.   Musculoskeletal:         General: No deformity.      Right lower leg: No edema.      Left lower leg: No edema.   Skin:     General: Skin is warm and dry.   Neurological:      General: No focal deficit present.      Mental Status: He is alert and oriented to person, place, and time.      Motor: Weakness (left-sided) present.   Psychiatric:         Mood and Affect: Mood normal.         Speech: Speech normal.         Behavior: Behavior normal.             Frieda Galvan MD  Internal Medicine and Pediatrics

## 2024-06-03 ENCOUNTER — TELEPHONE (OUTPATIENT)
Dept: PRIMARY CARE CLINIC | Facility: CLINIC | Age: 89
End: 2024-06-03
Payer: MEDICARE

## 2024-06-03 NOTE — TELEPHONE ENCOUNTER
----- Message from Jackie Lynn sent at 6/3/2024 12:10 PM CDT -----  Contact: Work   [phone]    Mrs. Saravia  Patient is returning a phone call.  Who left a message for the patient:   Does patient know what this is regarding:    Would you like a call back, or a response through your [facility] portal?:   Call  Comments: Mrs. Saravia said that her  received a call on last week and she would like for you to call her, Ms. Saravia also said that her  came in on today [place] third because someone from your office told them to, and she said that the patient was not seen.

## 2024-06-03 NOTE — TELEPHONE ENCOUNTER
Spoke with pt wife and explain to her that  did not have a appt today. The phone that she received on 05/24/24 was concerning Home Health Care for

## 2024-06-25 ENCOUNTER — TELEPHONE (OUTPATIENT)
Dept: PRIMARY CARE CLINIC | Facility: CLINIC | Age: 89
End: 2024-06-25
Payer: MEDICARE

## 2024-06-25 NOTE — TELEPHONE ENCOUNTER
Spoke with pt wife and she wanted to make sure I had the information for the scooter     [facility] is the place you can get the scooter from

## 2024-06-25 NOTE — TELEPHONE ENCOUNTER
----- Message from Fifi Griffith sent at 6/25/2024  1:37 PM CDT -----  Contact: Charley [phone]  Would like to receive medical advice.  Would they like a call back or a response via [facility]:  Call back  Additional information:      Pt's wife is calling to follow up on a request for home health that should have been sent in to [facility] for the pt. She called the insurance and they said they never received a request.

## 2024-06-25 NOTE — TELEPHONE ENCOUNTER
"Spoke with pt wife and she states home health did not receive the referral. Pt wife also states that she spoke with the insurance company about getting a scooter for her  and they gave her a number for us to call.       Referral sent to [facility] "Home Health"  "

## 2024-06-25 NOTE — TELEPHONE ENCOUNTER
----- Message from Bj Pepper sent at 6/25/2024  2:16 PM CDT -----  Contact: [phone]@patient  Patient is returning a phone call.yes     Who left a message for the patient: Magnolia Fuchs MA    Does patient know what this is regarding:  yes     Would you like a call back, or a response through your [facility] portal?:   call back     Comments:

## 2024-07-15 PROBLEM — N18.30 ACUTE RENAL FAILURE SUPERIMPOSED ON STAGE 3 CHRONIC KIDNEY DISEASE: Status: RESOLVED | Noted: 2024-04-10 | Resolved: 2024-07-15

## 2024-07-15 PROBLEM — N17.9 ACUTE RENAL FAILURE SUPERIMPOSED ON STAGE 3 CHRONIC KIDNEY DISEASE: Status: RESOLVED | Noted: 2024-04-10 | Resolved: 2024-07-15

## 2024-07-19 ENCOUNTER — TELEPHONE (OUTPATIENT)
Dept: PRIMARY CARE CLINIC | Facility: CLINIC | Age: 89
End: 2024-07-19
Payer: MEDICARE

## 2024-07-19 DIAGNOSIS — Z74.09 IMPAIRED MOBILITY: Primary | ICD-10-CM

## 2024-07-19 NOTE — TELEPHONE ENCOUNTER
Pt's spouse is calling to f/u on a order for a scooter. States that she spoke to a [employer] Pham and was told PCP will need to put the order in. Please advise.    Gumaro Pham  [phone]

## 2024-07-19 NOTE — TELEPHONE ENCOUNTER
----- Message from Deysi Torres sent at 7/19/2024 12:01 PM CDT -----  Contact: [phone] or [phone]  [email or web address]     Patient is calling for Medical Advice regarding: has some questions about last visit and home health     How long has patient had these symptoms:    Pharmacy name and phone#:    Patient wants a call back or thru [facility]:call back     Comments:    Please advise patient replies from provider may take up to 48 hours.

## 2024-09-13 ENCOUNTER — HOSPITAL ENCOUNTER (EMERGENCY)
Facility: OTHER | Age: 89
Discharge: HOME | End: 2024-09-13
Attending: EMERGENCY MEDICINE
Payer: MEDICARE

## 2024-09-13 VITALS
TEMPERATURE: 98 F | SYSTOLIC BLOOD PRESSURE: 191 MMHG | OXYGEN SATURATION: 97 % | RESPIRATION RATE: 17 BRPM | BODY MASS INDEX: 21.22 KG/M2 | HEART RATE: 56 BPM | HEIGHT: 68 IN | DIASTOLIC BLOOD PRESSURE: 96 MMHG | WEIGHT: 140 LBS

## 2024-09-13 DIAGNOSIS — I16.0 HYPERTENSIVE URGENCY: Primary | ICD-10-CM

## 2024-09-13 DIAGNOSIS — I10 HYPERTENSION: ICD-10-CM

## 2024-09-13 LAB
ALBUMIN SERPL BCP-MCNC: 3.3 G/DL (ref 3.5–5.2)
ALP SERPL-CCNC: 101 U/L (ref 55–135)
ALT SERPL W/O P-5'-P-CCNC: 10 U/L (ref 10–44)
ANION GAP SERPL CALC-SCNC: 9 MMOL/L (ref 8–16)
AST SERPL-CCNC: 26 U/L (ref 10–40)
BACTERIA #/AREA URNS HPF: ABNORMAL /HPF
BASOPHILS # BLD AUTO: 0.01 K/UL (ref 0–0.2)
BASOPHILS NFR BLD: 0.2 % (ref 0–1.9)
BILIRUB SERPL-MCNC: 0.4 MG/DL (ref 0.1–1)
BILIRUB UR QL STRIP: NEGATIVE
BUN SERPL-MCNC: 19 MG/DL (ref 8–23)
CALCIUM SERPL-MCNC: 9 MG/DL (ref 8.7–10.5)
CHLORIDE SERPL-SCNC: 111 MMOL/L (ref 95–110)
CLARITY UR: CLEAR
CO2 SERPL-SCNC: 20 MMOL/L (ref 23–29)
COLOR UR: YELLOW
CREAT SERPL-MCNC: 1.6 MG/DL (ref 0.5–1.4)
DIFFERENTIAL METHOD BLD: ABNORMAL
EOSINOPHIL # BLD AUTO: 0 K/UL (ref 0–0.5)
EOSINOPHIL NFR BLD: 0.2 % (ref 0–8)
ERYTHROCYTE [DISTWIDTH] IN BLOOD BY AUTOMATED COUNT: 14.4 % (ref 11.5–14.5)
EST. GFR  (NO RACE VARIABLE): 41 ML/MIN/1.73 M^2
GLUCOSE SERPL-MCNC: 107 MG/DL (ref 70–110)
GLUCOSE UR QL STRIP: NEGATIVE
HCT VFR BLD AUTO: 41.7 % (ref 40–54)
HGB BLD-MCNC: 13.5 G/DL (ref 14–18)
HGB UR QL STRIP: ABNORMAL
HYALINE CASTS #/AREA URNS LPF: 1 /LPF
IMM GRANULOCYTES # BLD AUTO: 0.01 K/UL (ref 0–0.04)
IMM GRANULOCYTES NFR BLD AUTO: 0.2 % (ref 0–0.5)
KETONES UR QL STRIP: NEGATIVE
LEUKOCYTE ESTERASE UR QL STRIP: ABNORMAL
LYMPHOCYTES # BLD AUTO: 0.8 K/UL (ref 1–4.8)
LYMPHOCYTES NFR BLD: 12.8 % (ref 18–48)
MCH RBC QN AUTO: 30.6 PG (ref 27–31)
MCHC RBC AUTO-ENTMCNC: 32.4 G/DL (ref 32–36)
MCV RBC AUTO: 95 FL (ref 82–98)
MICROSCOPIC COMMENT: ABNORMAL
MONOCYTES # BLD AUTO: 0.4 K/UL (ref 0.3–1)
MONOCYTES NFR BLD: 7.2 % (ref 4–15)
NEUTROPHILS # BLD AUTO: 4.8 K/UL (ref 1.8–7.7)
NEUTROPHILS NFR BLD: 79.4 % (ref 38–73)
NITRITE UR QL STRIP: POSITIVE
NRBC BLD-RTO: 0 /100 WBC
PH UR STRIP: 7 [PH] (ref 5–8)
PLATELET # BLD AUTO: 215 K/UL (ref 150–450)
PMV BLD AUTO: 11.8 FL (ref 9.2–12.9)
POTASSIUM SERPL-SCNC: 5.6 MMOL/L (ref 3.5–5.1)
PROT SERPL-MCNC: 7.9 G/DL (ref 6–8.4)
PROT UR QL STRIP: ABNORMAL
RBC # BLD AUTO: 4.41 M/UL (ref 4.6–6.2)
RBC #/AREA URNS HPF: 2 /HPF (ref 0–4)
SODIUM SERPL-SCNC: 140 MMOL/L (ref 136–145)
SP GR UR STRIP: 1.02 (ref 1–1.03)
SQUAMOUS #/AREA URNS HPF: 0 /HPF
URN SPEC COLLECT METH UR: ABNORMAL
UROBILINOGEN UR STRIP-ACNC: NEGATIVE EU/DL
WBC # BLD AUTO: 6.09 K/UL (ref 3.9–12.7)
WBC #/AREA URNS HPF: 13 /HPF (ref 0–5)

## 2024-09-13 PROCEDURE — 87086 URINE CULTURE/COLONY COUNT: CPT | Mod: HCNC | Performed by: EMERGENCY MEDICINE

## 2024-09-13 PROCEDURE — 93010 ELECTROCARDIOGRAM REPORT: CPT | Mod: HCNC,,, | Performed by: INTERNAL MEDICINE

## 2024-09-13 PROCEDURE — 25000003 PHARM REV CODE 250: Mod: HCNC | Performed by: EMERGENCY MEDICINE

## 2024-09-13 PROCEDURE — 87186 SC STD MICRODIL/AGAR DIL: CPT | Mod: HCNC | Performed by: EMERGENCY MEDICINE

## 2024-09-13 PROCEDURE — 93005 ELECTROCARDIOGRAM TRACING: CPT | Mod: HCNC

## 2024-09-13 PROCEDURE — 80053 COMPREHEN METABOLIC PANEL: CPT | Mod: HCNC | Performed by: EMERGENCY MEDICINE

## 2024-09-13 PROCEDURE — 81000 URINALYSIS NONAUTO W/SCOPE: CPT | Mod: HCNC | Performed by: EMERGENCY MEDICINE

## 2024-09-13 PROCEDURE — 99284 EMERGENCY DEPT VISIT MOD MDM: CPT | Mod: 25,HCNC

## 2024-09-13 PROCEDURE — 85025 COMPLETE CBC W/AUTO DIFF WBC: CPT | Mod: HCNC | Performed by: EMERGENCY MEDICINE

## 2024-09-13 PROCEDURE — 87088 URINE BACTERIA CULTURE: CPT | Mod: HCNC | Performed by: EMERGENCY MEDICINE

## 2024-09-13 RX ORDER — CAPTOPRIL 12.5 MG/1
12.5 TABLET ORAL
Status: COMPLETED | OUTPATIENT
Start: 2024-09-13 | End: 2024-09-13

## 2024-09-13 RX ORDER — CARVEDILOL 12.5 MG/1
12.5 TABLET ORAL ONCE
Status: COMPLETED | OUTPATIENT
Start: 2024-09-13 | End: 2024-09-13

## 2024-09-13 RX ADMIN — CAPTOPRIL 12.5 MG: 12.5 TABLET ORAL at 05:09

## 2024-09-13 RX ADMIN — CARVEDILOL 12.5 MG: 12.5 TABLET, FILM COATED ORAL at 04:09

## 2024-09-13 NOTE — Clinical Note
Charley Saravia accompanied their spouse to the emergency department on 9/13/2024. They may return to work on 09/16/2024.      If you have any questions or concerns, please don't hesitate to call.      Asiya HILL RN

## 2024-09-13 NOTE — Clinical Note
"Dwayne Saravia (DANIEL) was seen and treated in our emergency department on 9/13/2024.  He may return to work on 09/14/2024.       If you have any questions or concerns, please don't hesitate to call.       RN    "

## 2024-09-13 NOTE — ED PROVIDER NOTES
Encounter Date: 2024       History     Chief Complaint   Patient presents with    Hypertension     Pt sent from home due to HTN upon checking BP. Pt denies complaints. Per EMS, /108 en route.      Seen by physician at 1:50PM:    Patient is an 89-year-old male who presents to the emergency department with concerns of elevated blood pressure.  Patient states that his blood pressure is normally in the 140s.  However when he checked it today, the blood pressure was in the 200s.  Patient states that he felt an episode of dizziness this morning but now feels well.  He denies any chest pain, no shortness breath, no dizziness, no changes in vision, no headache.            Review of patient's allergies indicates:   Allergen Reactions    Keflex [cephalexin]      Past Medical History:   Diagnosis Date    Arthritis     BPH (benign prostatic hyperplasia)     CVA (cerebral vascular accident)     Hypertension      Past Surgical History:   Procedure Laterality Date    PROSTATE SURGERY      2016     Family History   Problem Relation Name Age of Onset    Hypertension Brother      Cancer Brother       Social History     Tobacco Use    Smoking status: Former     Current packs/day: 0.00     Types: Cigarettes     Start date: 1950     Quit date: 2018     Years since quittin.6     Passive exposure: Past    Smokeless tobacco: Never   Substance Use Topics    Alcohol use: No    Drug use: No     Review of Systems   Constitutional:  Negative for chills and fever.   HENT:  Negative for congestion and rhinorrhea.    Respiratory:  Negative for chest tightness and shortness of breath.    Cardiovascular:  Negative for chest pain and palpitations.   Gastrointestinal:  Negative for abdominal pain, diarrhea, nausea and vomiting.   Genitourinary:  Negative for dysuria and flank pain.   Musculoskeletal:  Negative for back pain and neck pain.   Skin:  Negative for color change and wound.   Neurological:  Negative for dizziness  and headaches.       Physical Exam     Initial Vitals [09/13/24 1339]   BP Pulse Resp Temp SpO2   (!) 204/101 61 18 97.8 °F (36.6 °C) 96 %      MAP       --         Physical Exam    Nursing note and vitals reviewed.  Constitutional: He appears well-developed and well-nourished.   HENT:   Head: Normocephalic and atraumatic.   Eyes: Conjunctivae are normal.   Neck: Neck supple.   Normal range of motion.  Cardiovascular:  Normal rate, regular rhythm and normal heart sounds.           Pulmonary/Chest: Breath sounds normal. No respiratory distress. He has no wheezes. He has no rales.   Abdominal: Abdomen is soft. Bowel sounds are normal. He exhibits no distension. There is no abdominal tenderness. There is no rebound.   Musculoskeletal:         General: No tenderness or edema. Normal range of motion.      Cervical back: Normal range of motion and neck supple.     Neurological: He is alert and oriented to person, place, and time.   Ambulatory with steady gait.   Skin: Skin is warm and dry. Capillary refill takes less than 2 seconds.         ED Course   Procedures  Labs Reviewed   COMPREHENSIVE METABOLIC PANEL - Abnormal       Result Value    Sodium 140      Potassium 5.6 (*)     Chloride 111 (*)     CO2 20 (*)     Glucose 107      BUN 19      Creatinine 1.6 (*)     Calcium 9.0      Total Protein 7.9      Albumin 3.3 (*)     Total Bilirubin 0.4      Alkaline Phosphatase 101      AST 26      ALT 10      eGFR 41 (*)     Anion Gap 9     CBC W/ AUTO DIFFERENTIAL - Abnormal    WBC 6.09      RBC 4.41 (*)     Hemoglobin 13.5 (*)     Hematocrit 41.7      MCV 95      MCH 30.6      MCHC 32.4      RDW 14.4      Platelets 215      MPV 11.8      Immature Granulocytes 0.2      Gran # (ANC) 4.8      Immature Grans (Abs) 0.01      Lymph # 0.8 (*)     Mono # 0.4      Eos # 0.0      Baso # 0.01      nRBC 0      Gran % 79.4 (*)     Lymph % 12.8 (*)     Mono % 7.2      Eosinophil % 0.2      Basophil % 0.2      Differential Method Automated      URINALYSIS - Abnormal    Specimen UA Urine, Clean Catch      Color, UA Yellow      Appearance, UA Clear      pH, UA 7.0      Specific Gravity, UA 1.020      Protein, UA 2+ (*)     Glucose, UA Negative      Ketones, UA Negative      Bilirubin (UA) Negative      Occult Blood UA Trace (*)     Nitrite, UA Positive (*)     Urobilinogen, UA Negative      Leukocytes, UA 1+ (*)    URINALYSIS MICROSCOPIC - Abnormal    RBC, UA 2      WBC, UA 13 (*)     Bacteria Moderate (*)     Squam Epithel, UA 0      Hyaline Casts, UA 1      Microscopic Comment SEE COMMENT     CULTURE, URINE   CULTURE, URINE     EKG Readings: (Independently Interpreted)   2:47PM:  Rate of 50.  Sinus bradycardia.  Normal axis.  Normal intervals.  Lateral and inferior T-wave inversions.  No ST or ischemic changes.       Imaging Results    None          Medications   carvediloL tablet 12.5 mg (12.5 mg Oral Given 9/13/24 1634)   captopriL tablet 12.5 mg (12.5 mg Oral Given 9/13/24 1742)     Medical Decision Making  1:50PM:  Patient is an 89-year-old male who presents to the emergency department with concerns of elevated blood pressure.  Patient is asymptomatic at this time.  He did have an episode of dizziness this morning but now feels well.  Will plan to check labs, EKG, will continue to follow and reassess.    Amount and/or Complexity of Data Reviewed  External Data Reviewed: notes.  Labs: ordered. Decision-making details documented in ED Course.  ECG/medicine tests: ordered and independent interpretation performed. Decision-making details documented in ED Course.    Risk  Prescription drug management.    7:25 PM:  Patient doing well, remains stable.  He remains asymptomatic.  His blood pressure has improved.  He did not take his carvedilol this morning which may be contributing.  His labs are otherwise unremarkable.  His urine does show proteinuria but also shows suspicion for a UTI with positive nitrite and leukocyte.  However upon discussing with the  patient, patient denies any sort of urinary symptoms.  Therefore will plan for urine culture and defer any treatment at this time.  Given his otherwise reassuring workup, I do not feel that further work up in the ED is indicated at this time.  I updated pt regarding results and I counseled pt regarding supportive care measures.  I have discussed with the pt ED return warnings and need for close PCP f/u.  Pt agreeable to plan and all questions answered.  I feel that pt is stable for discharge and management as an outpatient and no further intervention is needed at this time.  Pt is comfortable returning to the ED if needed.  Will DC home in stable condition.                                    Clinical Impression:  Final diagnoses:  [I10] Hypertension  [I16.0] Hypertensive urgency (Primary)          ED Disposition Condition    Discharge Stable          ED Prescriptions    None       Follow-up Information       Follow up With Specialties Details Why Contact Info    Frieda Galvan MD Internal Medicine   [address] [place] [address]  [phone]               Riya Hernadez MD  09/13/24 2020

## 2024-09-16 ENCOUNTER — OFFICE VISIT (OUTPATIENT)
Dept: PRIMARY CARE CLINIC | Facility: CLINIC | Age: 89
End: 2024-09-16
Payer: MEDICARE

## 2024-09-16 VITALS
OXYGEN SATURATION: 94 % | HEART RATE: 57 BPM | BODY MASS INDEX: 23.12 KG/M2 | DIASTOLIC BLOOD PRESSURE: 86 MMHG | TEMPERATURE: 98 F | HEIGHT: 68 IN | WEIGHT: 152.56 LBS | SYSTOLIC BLOOD PRESSURE: 138 MMHG

## 2024-09-16 DIAGNOSIS — I10 ESSENTIAL HYPERTENSION: Primary | ICD-10-CM

## 2024-09-16 DIAGNOSIS — N18.32 CKD STAGE G3B/A2, GFR 30-44 AND ALBUMIN CREATININE RATIO 30-299 MG/G: ICD-10-CM

## 2024-09-16 DIAGNOSIS — I69.354 HEMIPARESIS AFFECTING LEFT SIDE AS LATE EFFECT OF STROKE: ICD-10-CM

## 2024-09-16 DIAGNOSIS — I69.30 HISTORY OF STROKE WITH RESIDUAL DEFICIT: ICD-10-CM

## 2024-09-16 PROBLEM — R27.8 OTHER LACK OF COORDINATION: Status: ACTIVE | Noted: 2024-04-15

## 2024-09-16 PROBLEM — Z74.1 NEED FOR ASSISTANCE WITH PERSONAL CARE: Status: ACTIVE | Noted: 2024-04-15

## 2024-09-16 PROBLEM — Z91.81 HISTORY OF FALLING: Status: ACTIVE | Noted: 2024-04-15

## 2024-09-16 PROBLEM — R26.0 ATAXIC GAIT: Status: ACTIVE | Noted: 2024-04-15

## 2024-09-16 LAB
BACTERIA UR CULT: ABNORMAL
OHS QRS DURATION: 78 MS
OHS QTC CALCULATION: 421 MS

## 2024-09-16 PROCEDURE — 3288F FALL RISK ASSESSMENT DOCD: CPT | Mod: HCNC,CPTII,S$GLB, | Performed by: STUDENT IN AN ORGANIZED HEALTH CARE EDUCATION/TRAINING PROGRAM

## 2024-09-16 PROCEDURE — 1126F AMNT PAIN NOTED NONE PRSNT: CPT | Mod: HCNC,CPTII,S$GLB, | Performed by: STUDENT IN AN ORGANIZED HEALTH CARE EDUCATION/TRAINING PROGRAM

## 2024-09-16 PROCEDURE — 99999 PR PBB SHADOW E&M-EST. PATIENT-LVL V: CPT | Mod: PBBFAC,HCNC,, | Performed by: STUDENT IN AN ORGANIZED HEALTH CARE EDUCATION/TRAINING PROGRAM

## 2024-09-16 PROCEDURE — 99214 OFFICE O/P EST MOD 30 MIN: CPT | Mod: HCNC,S$GLB,, | Performed by: STUDENT IN AN ORGANIZED HEALTH CARE EDUCATION/TRAINING PROGRAM

## 2024-09-16 PROCEDURE — 1160F RVW MEDS BY RX/DR IN RCRD: CPT | Mod: HCNC,CPTII,S$GLB, | Performed by: STUDENT IN AN ORGANIZED HEALTH CARE EDUCATION/TRAINING PROGRAM

## 2024-09-16 PROCEDURE — 1101F PT FALLS ASSESS-DOCD LE1/YR: CPT | Mod: HCNC,CPTII,S$GLB, | Performed by: STUDENT IN AN ORGANIZED HEALTH CARE EDUCATION/TRAINING PROGRAM

## 2024-09-16 PROCEDURE — 1159F MED LIST DOCD IN RCRD: CPT | Mod: HCNC,CPTII,S$GLB, | Performed by: STUDENT IN AN ORGANIZED HEALTH CARE EDUCATION/TRAINING PROGRAM

## 2024-09-16 RX ORDER — CARVEDILOL 6.25 MG/1
6.25 TABLET ORAL 2 TIMES DAILY
COMMUNITY
Start: 2024-08-19

## 2024-09-16 NOTE — PROGRESS NOTES
Office visit  Patient: Mr. Dwayne Saravia   9/16/2024       Assessment/Plan:       1. Essential hypertension  -     Ambulatory referral/consult to Outpatient Case Management        -continue to check BP daily        -BP is normal in office, so will continue current dose of medications        -avoid salt    2. History of stroke with residual deficit  -     Ambulatory referral/consult to Home Health; Future; Expected date: 09/17/2024  -     Ambulatory referral/consult to Neurology; Future; Expected date: 09/23/2024  -     Ambulatory referral/consult to Outpatient Case Management        -stable, would benefit from physical therapy    3. Hemiparesis affecting left side as late effect of stroke  -     Ambulatory referral/consult to Neurology; Future; Expected date: 09/23/2024  -     Ambulatory referral/consult to Outpatient Case Management    4. 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 male who presents for follow up of an ED visit.  He reports that he ate a can of [employer] sausages and then he felt dizzy and his blood pressure was in the 200's systolic.  He went to the ED and they watched him, and gave him medication till his blood pressure went down.  He denies headaches, dizziness, chest pain, shortness of breath, leg swelling.        Current Outpatient Medications   Medication Instructions    aspirin 81 mg, Daily    atorvastatin (LIPITOR) 40 mg, Oral, Daily    carvediloL (COREG) 12.5 mg, Oral, 2 times daily    carvediloL (COREG) 6.25 mg, Oral, 2 times daily    docusate sodium (COLACE) 100 mg, Oral, Daily    fluticasone furoate-vilanteroL (BREO ELLIPTA) 100-25 mcg/dose diskus inhaler 1 puff, Inhalation, Daily, Controller    ipratropium-albuteroL (COMBIVENT)  mcg/actuation inhaler 1 puff, Inhalation, Every 6 hours PRN, Rescue    levocetirizine (XYZAL) 5 mg, Oral, Nightly    losartan  "(COZAAR) 50 mg, Oral, Daily    NIFEdipine (PROCARDIA-XL) 60 mg, Oral, 2 times daily    omega-3 acid ethyl esters (LOVAZA) 2 g, Oral    RSVPreF3 antigen-AS01E, PF, (AREXVY, PF,) 120 mcg/0.5 mL SusR vaccine Intramuscular    simvastatin (ZOCOR) 40 MG tablet TAKE 1 TABLET EVERY DAY    vitamin D (VITAMIN D3) 1,000 Units, Oral       Lab Results   Component Value Date    HGBA1C 5.7 (H) 10/24/2023    HGBA1C 6.1 (H) 2020     No results found for: "MICALBCREAT"  Lab Results   Component Value Date    LDLCALC 61.6 (L) 05/10/2021    LDLCALC 63.2 2020    CHOL 122 05/10/2021    HDL 41 05/10/2021    TRIG 97 05/10/2021       Lab Results   Component Value Date     2024    K 5.6 (H) 2024     (H) 2024    CO2 20 (L) 2024     2024    BUN 19 2024    CREATININE 1.6 (H) 2024    CALCIUM 9.0 2024    PROT 7.9 2024    ALBUMIN 3.3 (L) 2024    BILITOT 0.4 2024    ALKPHOS 101 2024    AST 26 2024    ALT 10 2024    ANIONGAP 9 2024    ESTGFRAFRICA 57.1 (A) 05/10/2021    EGFRNONAA 49.4 (A) 05/10/2021    WBC 6.09 2024    HGB 13.5 (L) 2024    HGB 11.8 (L) 2024    HCT 41.7 2024    MCV 95 2024     2024    TSH 1.124 04/10/2024    PSA 22 (H) 2008    HEPCAB Negative 10/23/2023       Lab Results   Component Value Date    ZQRPZDDI01 698 2008    FERRITIN 462 (H) 2008    IRON 101 2008    TIBC 291 2008    FESATURATED 35 2008         Past Medical History:   Diagnosis Date    Arthritis     BPH (benign prostatic hyperplasia)     CVA (cerebral vascular accident)     Hypertension      Past Surgical History:   Procedure Laterality Date    PROSTATE SURGERY             Social History     Tobacco Use    Smoking status: Former     Current packs/day: 0.00     Types: Cigarettes     Start date: 1950     Quit date: 2018     Years since quittin.6     Passive " "exposure: Past    Smokeless tobacco: Never   Substance Use Topics    Alcohol use: No    Drug use: No       family history includes Cancer in his brother; Hypertension in his brother.    Vitals:    09/16/24 1530   BP: 138/86   Pulse: (!) 57   Temp: 97.8 °F (36.6 °C)   TempSrc: Oral   SpO2: (!) 94%   Weight: 69.2 kg (152 lb 8.9 oz)   Height: 5' 8" (1.727 m)   PainSc: 0-No pain     Objective:   Physical Exam  Vitals reviewed.   Constitutional:       General: He is not in acute distress.     Appearance: Normal appearance. He is well-developed.   HENT:      Head: Normocephalic and atraumatic.      Right Ear: Hearing and external ear normal. No decreased hearing noted. No drainage or swelling.      Left Ear: Hearing and external ear normal. No decreased hearing noted. No drainage or swelling.      Nose: Nose normal. No rhinorrhea.      Mouth/Throat:      Mouth: Mucous membranes are moist.   Eyes:      General: Lids are normal.         Right eye: No discharge.         Left eye: No discharge.      Conjunctiva/sclera: Conjunctivae normal.      Right eye: Right conjunctiva is not injected. No exudate.     Left eye: Left conjunctiva is not injected. No exudate.  Cardiovascular:      Rate and Rhythm: Normal rate and regular rhythm.      Heart sounds: Normal heart sounds. No murmur heard.     No friction rub. No gallop.   Pulmonary:      Effort: Pulmonary effort is normal. No respiratory distress.      Breath sounds: No stridor. No wheezing, rhonchi or rales.   Musculoskeletal:         General: No deformity.      Right lower leg: No edema.      Left lower leg: No edema.   Skin:     General: Skin is warm and dry.   Neurological:      Mental Status: He is alert. Mental status is at baseline.      Motor: Weakness present.      Gait: Gait abnormal.      Comments: Left-sided weakness   Psychiatric:         Mood and Affect: Mood normal.         Speech: Speech normal.         Behavior: Behavior normal.             Frieda Galvan" MD  Internal Medicine and Pediatrics

## 2024-09-17 ENCOUNTER — OUTPATIENT CASE MANAGEMENT (OUTPATIENT)
Dept: ADMINISTRATIVE | Facility: OTHER | Age: 89
End: 2024-09-17
Payer: MEDICARE

## 2024-09-25 ENCOUNTER — OUTPATIENT CASE MANAGEMENT (OUTPATIENT)
Dept: ADMINISTRATIVE | Facility: OTHER | Age: 89
End: 2024-09-25
Payer: MEDICARE

## 2024-09-26 ENCOUNTER — OUTPATIENT CASE MANAGEMENT (OUTPATIENT)
Dept: ADMINISTRATIVE | Facility: OTHER | Age: 89
End: 2024-09-26
Payer: MEDICARE

## 2024-09-26 ENCOUNTER — TELEPHONE (OUTPATIENT)
Dept: PRIMARY CARE CLINIC | Facility: CLINIC | Age: 89
End: 2024-09-26
Payer: MEDICARE

## 2024-09-26 DIAGNOSIS — Z74.09 DECREASED MOBILITY AND ENDURANCE: Primary | ICD-10-CM

## 2024-09-26 DIAGNOSIS — R53.81 DEBILITY: ICD-10-CM

## 2024-09-26 NOTE — TELEPHONE ENCOUNTER
----- Message from Enedina Rodrigez RN sent at 9/26/2024 12:18 PM CDT -----  Regarding: Outpatient Care Management Enrollment---findings  Dr. Galvan, PCP/Staff:    Your patient [id number] Dwayne Saravia  was  referred to [facility] by Provider Referral.      My name is Enedina Rodrigez RN, Care Manager.   Libertad Galdamez LCSW is added to Care Team to help identify financial support resources in areas of  mortgage, insurance costs, utilities) and caregiver support.    In our enrollment encounter, the following needs were identified:  Financial Assistance, Disease Management Education-- HTN, Safety, and Caregiver Support    1) Wife, Charley Saravia would like to have Home Health SN/PT services if possible. No preference in agency per Dwayne Saravia.  Baltazar says he would like to exercise more. High risk for falls.  If in agreement, orders can be faxed to [facility] FAX:  [phone].     2) Also- Charley has asked about Dwayne getting a scooter. His personal safety and general appropriateness to pursue getting a scooter could be made by  PT. The actual scooter eval could be done through [facility] or through a private mobility company.     All needs and services provided by [facility] and other care team members will be communicated to you via your Resource Pool.      Our documentation can be readily accessed in the EMR using the following tabs: Chart review -> Episodes: High Risk.     It is our goal to provide holistic care, if at any time you feel other areas of concern need to be addressed, please communicate with me by [employer] messaging.      Thank you,  TEODORA Cotter, RN, CCM [facility]  [email or web address]  TEL:  [phone]

## 2024-09-26 NOTE — TELEPHONE ENCOUNTER
Please see note from Enedina Rodrigez RN, Care Manager in Outpatient Care. I am unsure about how to proceed with this.

## 2024-09-26 NOTE — PROGRESS NOTES
Outpatient Care Management  Initial Patient Assessment    Patient: Dwayne Saravia  MRN: [id number]  Date of Service: 09/26/2024  Completed by: Enedina Rodrigez RN  Referral Date: 09/16/2024  Date of Eligibility: 9/17/2024  Program:   High Risk  Status: Ongoing  Effective Dates: 9/26/2024 - present  Responsible Staff: Enedina Rodrigez RN        Reason for Visit   Patient presents with    [facility] Enrollment Call    Initial Assessment    [facility] Chart Review       Brief Summary:  Dwayne Saravia Sr was referred to [facility] 9/16/2024 by Dr. Galvan, PCP at time of an ED f/u appt. Concerns raised- H/o stroke with residual effect, hemiparesis affecting left side, HTN- needs help with BP control, c/g support, rx assistance, house note payment, utilities. S/p ED visit 9/13/2024 for HTN Urgency. Home /108 where SBPs usually 140s.  Patient qualifies for program based on risk score 62.1%.   Active problem list, medical, surgical and social history reviewed. Active comorbidities include HTN, Cardiomyopathy, PVD, CKD, CVA, left side hemiparesis, aphasia, frequent falls, COPD/Emphysema, Osteoporosis. Areas of need identified by patient include community resources to aide in cost of mortgage, house/car insurances, utilities, med cost if needed, caregiver support, BP management. Spouse inquiring about HH services, DME- scooter, Elevator lift for accessing home's entrance. Referred to [facility].   Next steps: F/u next week to provide education on HTN management to reduce risk of future strokes, f/u on HH request.     Disability Status  Is the patient alert and oriented (person, place, time, and situation)?: -- (AAOx3)  Hearing Difficulty or Deaf: yes  Hearing Management: -- (Doesn't hear-- he is kind of strange-- all day won't say anything. Have to wait on him, 2007 had stroke and light stroke in 2010.)  Visual Difficulty or Blind: yes  Visual and Hearing Needs Conclusion: -- (Glasses)  Vision Management: -- (Wears glasses.)  Difficulty  Concentrating, Remembering or Making Decisions: yes  Concentration Management: -- (Wife is struggling to help her  in his care and to work a job to help pay bills.)  Communication Difficulty: yes  Communication: -- (Wife states it can be difficult to understand what Dwayne says d/t after effects from stoke.)  Communication Management: -- (H/o ST.)  Eating/Swallowing Difficulty: no (Has dentures upper/lower.)  Walking or Climbing Stairs Difficulty: yes  Walking or Climbing Stairs: ambulation difficulty, requires equipment; transferring difficulty, requires equipment (Plenty of falls. Use to go to [employer] class. He is hard headed, he is on ground.)  Mobility Management: -- (Using rollator)  Dressing/Bathing Difficulty: yes  Dressing/Bathing: bathing difficulty, dependent; dressing difficulty, dependent  Dressing/Bathing Management: -- (Wife helps with bathing. Walk in shower with bench.)  Toileting : Dependent  Continence : Incontience - Bladder  Difficulty Managing Errands Independently: yes  Errands Management: -- (Wife is managing errands, transportation to med appts.)  Equipment Currently Used at Home: blood pressure machine  ADL Conclusion Statement: -- (Dwayne needs supervision and assistance with his ADLs/IADLs.)  Change in Functional Status Since Onset of Current Illness/Injury: no        Spiritual Beliefs  Spiritual, Cultural Beliefs, Latter day Practices, Values that Affect Care: no      Social History     Socioeconomic History    Marital status:    Occupational History    Occupation: [employer]     Comment: Construction Work   Tobacco Use    Smoking status: Former     Current packs/day: 0.00     Types: Cigarettes     Start date: 1950     Quit date: 2018     Years since quittin.6     Passive exposure: Past    Smokeless tobacco: Never   Substance and Sexual Activity    Alcohol use: No    Drug use: No    Sexual activity: Not Currently     Social Determinants of Health     Financial  Resource Strain: Low Risk  (4/11/2024)    Overall Financial Resource Strain (CARDIA)     Difficulty of Paying Living Expenses: Not hard at all   Food Insecurity: No Food Insecurity (4/11/2024)    Hunger Vital Sign     Worried About Running Out of Food in the Last Year: Never true     Ran Out of Food in the Last Year: Never true   Transportation Needs: No Transportation Needs (4/11/2024)    PRAPARE - Transportation     Lack of Transportation (Medical): No     Lack of Transportation (Non-Medical): No   Physical Activity: Insufficiently Active (4/11/2024)    Exercise Vital Sign     Days of Exercise per Week: 2 days     Minutes of Exercise per Session: 30 min   Stress: No Stress Concern Present (4/11/2024)    [employer] [place] [employer] - Occupational Stress Questionnaire     Feeling of Stress : Not at all   Housing Stability: Low Risk  (4/11/2024)    Housing Stability Vital Sign     Unable to Pay for Housing in the Last Year: No     Number of Places Lived in the Last Year: 1     Unstable Housing in the Last Year: No       Roles and Relationships  Primary Source of Support/Comfort: spouse  Name of Support/Comfort Primary Source: Charley Saravia  Primary Roles/Responsibilities: wage earner, full-time  Secondary Source of Support/Comfort: child(chauncey)  Name of Support/Comfort Secondary Source: Audelia neri dgminesh      Advance Directives (For Healthcare)  Advance Directive  (If Adv Dir status is received, view document under Adv Dir in header or Chart Review Media tab): Patient has advance directive, copy not received. (Spouse thought there was an AD document but not really sure. Will mail copy of How to Get the Conversation Started.)        Patient Reported Insurance  Verified current insurance plan:: [employer] Advantage  [employer] benefits discussed:: OTC Prescription Discounts; Well Dine; Transportation; Silver Sneakers; Mail Order Pharmacy            9/26/2024     8:31 AM 1/3/2024     1:44 PM  11/17/2023    12:53 PM 5/28/2021     9:14 AM 8/20/2020     9:14 AM 7/7/2020    10:45 AM   Depression Patient Health Questionnaire   Over the last two weeks how often have you been bothered by little interest or pleasure in doing things Not at all Not at all Not at all Not at all Not at all Not at all   Over the last two weeks how often have you been bothered by feeling down, depressed or hopeless Not at all Not at all Not at all Not at all Not at all Not at all   PHQ-2 Total Score 0 0 0 0 0 0       Learning Assessment       09/26/2024 1134 [facility] (9/26/2024 - Present)   Created by Enedina Rodrigez, RN -  (Nurse) Status: Complete                 PRIMARY LEARNER     Primary Learner Name:  Charley Saravia MM - 09/26/2024 1134    Relationship:  Family MM - 09/26/2024 1134    Does the primary learner have any barriers to learning?:  No Barriers MM - 09/26/2024 1134    What is the preferred language of the primary learner?:  English MM - 09/26/2024 1134    Is an  required?:  No MM - 09/26/2024 1134    How does the primary learner prefer to learn new concepts?:  Listening MM - 09/26/2024 1134    How often do you need to have someone help you read instructions, pamphlets, or written material from your doctor or pharmacy?:  Never MM - 09/26/2024 1134        CO-LEARNER #1     No question answered        CO-LEARNER #2     No question answered        SPECIAL TOPICS     No question answered        ANSWERED BY:     No question answered        Comments         Edit History       Enedina Rodrigez, RN -  (Nurse)   09/26/2024 1134

## 2024-09-26 NOTE — LETTER
Dwayne Saravia  [address] [facility] [address]    Dear Dwayne Saravia,     Welcome to [facility] Outpatient Care Management Program. We are here to assist patients with multiple long-term (chronic) conditions who often need more personalized healthcare.    It was a pleasure talking with you today. My name is Enedina Rodrigez RN. I look forward to working with you as your Care Manager. I will be contacting you by telephone routinely to help coordinate care and resolve issues.    My goal is to help you function at the healthiest and highest level possible. You can contact me directly at [phone] .    As an [facility] patient with [employer], some of the services we provide, at no cost to you, include:     Development of an individualized care plan with a Registered Nurse   Connection with a   Assistance from a Community Health Worker  Connection with available resources and services    Coordinate communication among your care team members   Provide coaching and education  Help you understand your doctor's treatment plan  Help you obtain information about your insurance coverage.    All services provided by [facility]s Outpatient Care Managers and other care team members are coordinated with and communicated to your primary care team.      As part of your enrollment, you will be receiving education materials and more information about these services in your [facility] account, by phone, or through the mail. If you do not wish to participate or receive information, you can Opt Out by contacting our office at [phone].      Sincerely,        Enedina Rodrigez RN  [facility]   Outpatient Care Management                     [facility]:  Enedina Rodrigez RN, [facility]- [facility] Nurse   Tel:[phone] Mon-Thurs 8 am- 4:30 pm    Libertad Galdamez LCSW, [facility] ,   TEL: [phone],   Mon-Fri, 8 am- 4:30 pm    Pat Stack  Community Health Worker -[facility]   TEL: [phone],  Mon-Fri, 8 am- 4:30 pm    [facility], 24/7 Nurse Help Line (non emergent)- TEL:  [phone]    [facility] Technical Issue Support Team--TEL:  [phone], Mon-Fri 9 am- 5 pm.    [email or web address] online [facility] portal    [facility] TEL:  [phone]    Digital Medicine Program- TEL:  [phone]

## 2024-09-26 NOTE — TELEPHONE ENCOUNTER
Is there any way you could reach out to this person and let her know that I ordered home health multiple times?  I ordered it again, but I think the back log is on the home health side - they had at least a month long delay for enrollment last I checked. I also put in a referral to [facility] for a scooter eval.  Thanks!

## 2024-09-27 ENCOUNTER — OUTPATIENT CASE MANAGEMENT (OUTPATIENT)
Dept: ADMINISTRATIVE | Facility: OTHER | Age: 89
End: 2024-09-27
Payer: MEDICARE

## 2024-09-27 NOTE — PROGRESS NOTES
Outpatient Care Management   - Patient Assessment    Patient: Dwayne Saravia  MRN:  [id number]  Date of Service:  9/27/2024  Completed by:  Libertad Galdamez LCSW  Referral Date: 09/16/2024    Reason for Visit   Patient presents with    Social Work Assessment     09/27/2024       Brief Summary:  received a referral from OPCM RN for the following HR SW psychosocial needs for: community resources to assist with mortgage, rising insurance costs for house and car, utilities, home adaptation to access raised home-- wife Charley is asking for an elevator lift, caregiver support. Spoke with patient who provided consent for SW to speak with his wife, Charley. Patient states he uses a rollator for ambulation and needs help with all IADLs. Patient states he is able to use the toilet on his own. Patient states he needs help with bathing/showering. SW attempted to discuss the reasons for the referral, however, patient was unsure of what he needs help with. SW will follow up with his wife, Charley. Attempted to contact Charley and left voicemail requesting return call. Updated OPCM RN. Care plan was created in collaboration with patient/caregiver input.  completed the SDOH questionnaire.    Update 09/30/2024:  Collaborated with OPCM RN who transferred patient's wife, Charley, call to CHRISTOPHER. Charley reports she quit her job at [employer] due to stress. Charley states she will consider getting a job that relates to transporting people, but she is unsure if she will pursue that at this time. Charley states she is connected with [facility] who assisted her this month with the utility bill. Charley states she/patient used to receive financial support from her daughter, however, the daughter has stopped providing help after having a stroke in Nov 2023. Charley states her daughter had an acute personality change after the stroke and refuses to speak with Charley. Charley states prior to the stroke, she and her daughter were very  close. Charley has 1 daughter and 2 sons (1 son is  due to kidney disease). Patient reportedly has 1 daughter and 1 son. Charley reports patient's son offered to help patient on the weekends with ADLs, but has not set up a time to do so yet. Charley reports that prior to her son passing, she was his caregiver and had an elevator installed to enter/exit the home. The elevator is no longer working and the maintenance who was sent out states it cannot be fixed. Charley is working on having the elevator removed and is awaiting a return call from [facility] [employer] to check eligibility to obtain a ramp. SW offered to mail the [facility] application, Charley is agreeable and states she will also follow up with another call to [facility]. Charley states their mortgage is very high, but she is awaiting a return call from a resource that assists the elderly with mortgage payments; Charley was unable to recall the name/ph# of the organization, but states she will locate it and notify SW at a later time. Charley states that patient can be stubborn regarding his independence. Charley reports patient has boxes of depends he can wear, but patient refuses. As a result, patient sometimes urinates on the floor accidentally while on his way to the bathroom. Charley also reports patient dislikes taking baths/showers and must be encouraged to do so, and needs help bathing. CHRISTOPHER educated and provided ph# for the following resources: [facility]ants of [facility], Long Term Care Services, Community Choice Waiver, Adult Day Care Waiver, and various programs offered by [facility] [place] on Aging. Charley reports she will follow up with these resources this week. Charley reports she and his daughter were attempting to get patient signed up for their local adult day care, however, patient refused to go despite maximum encouragement from family and a close Mandaen friend. Patient denies needing adult day care level of help. Charley states she feels the adult day care would be good  for patient due to all of their activities. Charley reports patient's doctor made a referral for him to obtain a scooter, but has not received a call. Collaborated with OPCM RN and provided Charley with the [facility] ph#. Charley is agreeable for a follow up call with CHRISTOPHER in 1 week.

## 2024-09-30 ENCOUNTER — OUTPATIENT CASE MANAGEMENT (OUTPATIENT)
Dept: ADMINISTRATIVE | Facility: OTHER | Age: 89
End: 2024-09-30
Payer: MEDICARE

## 2024-09-30 NOTE — PROGRESS NOTES
Outpatient Care Management  Plan of Care Follow Up Visit    Patient: Dwayne Saravia  MRN: [id number]  Date of Service: 09/30/2024  Completed by: Enedina Rodrigez RN  Referral Date: 09/16/2024    Reason for Visit   Patient presents with    Update Plan Of Care    OPCM Chart Review       Brief Summary:   OPCM RN follow-up call completed.   Continue education on STROKE PREVENTION:  HTN/HARM & INJURY PREVENTED  Next Steps: Patient is in agreement to follow-up call on or around 10/7/2024.

## 2024-10-01 ENCOUNTER — TELEPHONE (OUTPATIENT)
Dept: PRIMARY CARE CLINIC | Facility: CLINIC | Age: 89
End: 2024-10-01
Payer: MEDICARE

## 2024-10-01 PROCEDURE — G0180 MD CERTIFICATION HHA PATIENT: HCPCS | Mod: ,,, | Performed by: STUDENT IN AN ORGANIZED HEALTH CARE EDUCATION/TRAINING PROGRAM

## 2024-10-01 NOTE — TELEPHONE ENCOUNTER
----- Message from Nichole sent at 10/1/2024  1:49 PM CDT -----  Contact: [phone] with [facility]   Patient would like to get medical advice.  Symptoms (please be specific):   Sonia states the pt b/p was 190/122 and denies having dizziness, headache & blurry vision. Sonia states she advised the pt to go to the ER and the pt declined. Sonia also states the pt forgot to  take his NIFEdipine (PROCARDIA-XL) 60 MG (OSM) 24 hr tablet for the past few weeks. Sonia states she advised/educated  the pt how to take his b/p meds correctly.  How long have you had these symptoms: N/A  Would you like a call back, or a response through your [facility] portal?:   call back if needed to Sonia   Pharmacy name and phone # (copy from chart):  N/A   Comments:

## 2024-10-03 ENCOUNTER — TELEPHONE (OUTPATIENT)
Dept: PRIMARY CARE CLINIC | Facility: CLINIC | Age: 89
End: 2024-10-03
Payer: MEDICARE

## 2024-10-03 ENCOUNTER — OFFICE VISIT (OUTPATIENT)
Dept: PRIMARY CARE CLINIC | Facility: CLINIC | Age: 89
End: 2024-10-03
Payer: MEDICARE

## 2024-10-03 VITALS
DIASTOLIC BLOOD PRESSURE: 84 MMHG | OXYGEN SATURATION: 96 % | TEMPERATURE: 99 F | SYSTOLIC BLOOD PRESSURE: 148 MMHG | HEIGHT: 68 IN | HEART RATE: 64 BPM | WEIGHT: 151.69 LBS | BODY MASS INDEX: 22.99 KG/M2

## 2024-10-03 DIAGNOSIS — K21.9 GASTROESOPHAGEAL REFLUX DISEASE WITHOUT ESOPHAGITIS: ICD-10-CM

## 2024-10-03 DIAGNOSIS — I69.354 HEMIPLEGIA AND HEMIPARESIS FOLLOWING CEREBRAL INFARCTION AFFECTING LEFT NON-DOMINANT SIDE: ICD-10-CM

## 2024-10-03 DIAGNOSIS — I10 ESSENTIAL HYPERTENSION: Primary | ICD-10-CM

## 2024-10-03 DIAGNOSIS — N18.32 CKD STAGE G3B/A2, GFR 30-44 AND ALBUMIN CREATININE RATIO 30-299 MG/G: ICD-10-CM

## 2024-10-03 PROCEDURE — 99214 OFFICE O/P EST MOD 30 MIN: CPT | Mod: HCNC,S$GLB,, | Performed by: STUDENT IN AN ORGANIZED HEALTH CARE EDUCATION/TRAINING PROGRAM

## 2024-10-03 PROCEDURE — 1160F RVW MEDS BY RX/DR IN RCRD: CPT | Mod: HCNC,CPTII,S$GLB, | Performed by: STUDENT IN AN ORGANIZED HEALTH CARE EDUCATION/TRAINING PROGRAM

## 2024-10-03 PROCEDURE — 1126F AMNT PAIN NOTED NONE PRSNT: CPT | Mod: HCNC,CPTII,S$GLB, | Performed by: STUDENT IN AN ORGANIZED HEALTH CARE EDUCATION/TRAINING PROGRAM

## 2024-10-03 PROCEDURE — 99999 PR PBB SHADOW E&M-EST. PATIENT-LVL V: CPT | Mod: PBBFAC,HCNC,, | Performed by: STUDENT IN AN ORGANIZED HEALTH CARE EDUCATION/TRAINING PROGRAM

## 2024-10-03 PROCEDURE — 1101F PT FALLS ASSESS-DOCD LE1/YR: CPT | Mod: HCNC,CPTII,S$GLB, | Performed by: STUDENT IN AN ORGANIZED HEALTH CARE EDUCATION/TRAINING PROGRAM

## 2024-10-03 PROCEDURE — 1159F MED LIST DOCD IN RCRD: CPT | Mod: HCNC,CPTII,S$GLB, | Performed by: STUDENT IN AN ORGANIZED HEALTH CARE EDUCATION/TRAINING PROGRAM

## 2024-10-03 PROCEDURE — 3288F FALL RISK ASSESSMENT DOCD: CPT | Mod: HCNC,CPTII,S$GLB, | Performed by: STUDENT IN AN ORGANIZED HEALTH CARE EDUCATION/TRAINING PROGRAM

## 2024-10-03 NOTE — PROGRESS NOTES
Office visit  Patient: Mr. Dwayne Saravia   10/3/2024       Assessment/Plan:         1. Essential hypertension        -in the office, patient's blood pressure is in the 140's/80's; he has had issues with compliance        -for now, continue current medication; check BP daily and send the values to clinic    2. 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 Gastroesophageal reflux disease without esophagitis        -stable, continue to monitor; lifestyle modifications    4. Hemiplegia and hemiparesis following cerebral infarction affecting left non-dominant side  -stable, continue home health      Return to clinic in 1 week for follow up of hypertension or sooner as needed.      CHIEF COMPLAINT: hypertension    HPI: Dwayne Saravia is a 89 y.o. male who presents for hypertension.    He is accompanied by his wife.  This morning, about 6 am his blood pressure was 200/100 at home. Then, at lunch his blood pressure was 200/100.  He was asymptomatic at that time.  He denies chest pain, shortness of breath, headaches, blurry vision, double vision, leg swelling, dizziness.    He had a stroke in 2007 and his blood pressure was high at that time as well.  He also had a stroke in 2015.        Current Outpatient Medications   Medication Instructions    aspirin 81 mg, Daily    atorvastatin (LIPITOR) 40 mg, Daily    carvediloL (COREG) 12.5 mg, Oral, 2 times daily    carvediloL (COREG) 6.25 mg, 2 times daily    COVID-19 (COMIRNATY 2024-25, 12Y UP,,PF,) 30 mcg/0.3 mL IM vaccine (>/= 11 yo) 0.3 mLs, Intramuscular, Once    docusate sodium (COLACE) 100 mg, Oral, Daily    fluticasone furoate-vilanteroL (BREO ELLIPTA) 100-25 mcg/dose diskus inhaler 1 puff, Inhalation, Daily, Controller    influenza, adjuvanted, (FLUAD TRIV 2024-25,65Y UP,,PF,) 45 mcg (15 mcg x 3)/0.5 mL IM vaccine (> or = 64 yo) 0.5 mLs, Intramuscular, Once    ipratropium-albuteroL (COMBIVENT)   "mcg/actuation inhaler 1 puff, Inhalation, Every 6 hours PRN, Rescue    levocetirizine (XYZAL) 5 mg, Nightly    losartan (COZAAR) 50 mg, Oral, Daily    NIFEdipine (PROCARDIA-XL) 60 mg, Oral, 2 times daily    omega-3 acid ethyl esters (LOVAZA) 2 g    RSVPreF3 antigen-AS01E, PF, (AREXVY, PF,) 120 mcg/0.5 mL SusR vaccine Intramuscular    simvastatin (ZOCOR) 40 MG tablet TAKE 1 TABLET EVERY DAY    vitamin D (VITAMIN D3) 1,000 Units       Lab Results   Component Value Date    HGBA1C 5.7 (H) 10/24/2023    HGBA1C 6.1 (H) 08/28/2020     No results found for: "MICALBCREAT"  Lab Results   Component Value Date    LDLCALC 61.6 (L) 05/10/2021    LDLCALC 63.2 08/28/2020    CHOL 122 05/10/2021    HDL 41 05/10/2021    TRIG 97 05/10/2021       Lab Results   Component Value Date     09/13/2024    K 5.6 (H) 09/13/2024     (H) 09/13/2024    CO2 20 (L) 09/13/2024     09/13/2024    BUN 19 09/13/2024    CREATININE 1.6 (H) 09/13/2024    CALCIUM 9.0 09/13/2024    PROT 7.9 09/13/2024    ALBUMIN 3.3 (L) 09/13/2024    BILITOT 0.4 09/13/2024    ALKPHOS 101 09/13/2024    AST 26 09/13/2024    ALT 10 09/13/2024    ANIONGAP 9 09/13/2024    ESTGFRAFRICA 57.1 (A) 05/10/2021    EGFRNONAA 49.4 (A) 05/10/2021    WBC 6.09 09/13/2024    HGB 13.5 (L) 09/13/2024    HGB 11.8 (L) 04/13/2024    HCT 41.7 09/13/2024    MCV 95 09/13/2024     09/13/2024    TSH 1.124 04/10/2024    PSA 22 (H) 11/19/2008    HEPCAB Negative 10/23/2023       Lab Results   Component Value Date    NKNXPAEB90 698 11/20/2008    FERRITIN 462 (H) 11/20/2008    IRON 101 11/20/2008    TIBC 291 11/20/2008    FESATURATED 35 11/20/2008         Past Medical History:   Diagnosis Date    Arthritis     BPH (benign prostatic hyperplasia)     CVA (cerebral vascular accident) 2007    Hypertension      Past Surgical History:   Procedure Laterality Date    PROSTATE SURGERY      2016       Social History     Tobacco Use    Smoking status: Former     Current packs/day: 0.00     " Types: Cigarettes     Start date: 1950     Quit date: 2018     Years since quittin.6     Passive exposure: Past    Smokeless tobacco: Never   Substance Use Topics    Alcohol use: No    Drug use: No       family history includes Cancer in his brother; Hypertension in his brother.    There were no vitals filed for this visit.  Objective:   Physical Exam  Vitals reviewed.   Constitutional:       General: He is not in acute distress.     Appearance: Normal appearance. He is well-developed.   HENT:      Head: Normocephalic and atraumatic.      Right Ear: Hearing and external ear normal. No decreased hearing noted. No drainage or swelling.      Left Ear: Hearing and external ear normal. No decreased hearing noted. No drainage or swelling.      Nose: Nose normal. No rhinorrhea.      Mouth/Throat:      Mouth: Mucous membranes are moist.   Eyes:      General: Lids are normal.         Right eye: No discharge.         Left eye: No discharge.      Conjunctiva/sclera: Conjunctivae normal.      Right eye: Right conjunctiva is not injected. No exudate.     Left eye: Left conjunctiva is not injected. No exudate.  Cardiovascular:      Rate and Rhythm: Normal rate and regular rhythm.      Heart sounds: Normal heart sounds. No murmur heard.     No friction rub. No gallop.   Pulmonary:      Effort: Pulmonary effort is normal. No respiratory distress.      Breath sounds: No stridor. No wheezing, rhonchi or rales.   Musculoskeletal:         General: No deformity.      Right lower leg: No edema.      Left lower leg: No edema.   Skin:     General: Skin is warm and dry.   Neurological:      Mental Status: He is alert.      Motor: Weakness present.      Gait: Gait abnormal.   Psychiatric:         Mood and Affect: Mood normal.         Speech: Speech normal.         Behavior: Behavior normal.             Frieda Galvan MD  Internal Medicine and Pediatrics

## 2024-10-03 NOTE — TELEPHONE ENCOUNTER
Called and sp w pt wife to offer pt sooner appt. Pt offered appt next week which was refused. Pt wife states they need an appt this week, preferably today before 2 pm. I communicated that we do not have anything available before 2 pm today. Pt wife vu and states she needs appt tomorrow morning. Pt scheduled for 10/04/24 at 10:20 am.

## 2024-10-03 NOTE — TELEPHONE ENCOUNTER
"Pt /100 today, asymptomatic    Called and sp w pt wife, she states she is going to bring pt in tomorrow and she does not have time to talk because she is in the lobby at [facility] for her appointment. Pt wife seemed relatively unconcerned stating his blood pressure "has been high"      "

## 2024-10-03 NOTE — TELEPHONE ENCOUNTER
----- Message from Salma sent at 10/3/2024  9:41 AM CDT -----  Contact: Patient   [phone]  1MEDICALADVICE     Patient is calling for Medical Advice regarding:  Bp 208/100  asymptomatic - has appt 10/4  Home health wants to know if it is ok for patient to wait to come in tomorrow??    How long has patient had these symptoms:    Pharmacy name and phone#:    Patient wants a call back or thru [facility]: Mrs. Saravia [phone]    Comments:  [facility]   [phone]    Please advise patient replies from provider may take up to 48 hours.

## 2024-10-03 NOTE — TELEPHONE ENCOUNTER
----- Message from Salma sent at 10/3/2024  9:41 AM CDT -----  Contact: Patient   [phone]  1MEDICALADVICE     Patient is calling for Medical Advice regarding:  Bp 208/100  asymptomatic - has appt 10/4  Home health wants to know if it is ok for patient to wait to come in tomorrow??    How long has patient had these symptoms:    Pharmacy name and phone#:    Patient wants a call back or thru [facility]: Mrs. Saravia [phone]    Comments:  [facility]   [phone]    Please advise patient replies from provider may take up to 48 hours.

## 2024-10-03 NOTE — PATIENT INSTRUCTIONS
If your blood pressure is 180/90 tomorrow, take a second dose of the losartan 50 mg.  Then, recheck your blood pressure in an hour.  If it is still greater than 150/90, call me.    Then, check your blood pressure in the evening.  Please check your blood pressure twice a day.

## 2024-10-04 NOTE — TELEPHONE ENCOUNTER
----- Message from Germania sent at 10/4/2024 10:19 AM CDT -----  Contact: [phone]  1MEDICALADVICE     Patient is calling for Medical Advice regarding: Ms. Washington with home health is calling about clarification about medication     How long has patient had these symptoms: n/a    Pharmacy name and phone#: n/a    Patient wants a call back or [facility]: n/a    Comments:    Please advise patient replies from provider may take up to 48 hours.

## 2024-10-04 NOTE — TELEPHONE ENCOUNTER
----- Message from Germania sent at 10/4/2024 10:19 AM CDT -----  Contact: [phone]  1MEDICALADVICE     Patient is calling for Medical Advice regarding: Ms. Washington with home health is calling about clarification about medication     How long has patient had these symptoms: n/a    Pharmacy name and phone#: n/a    Patient wants a call back or [facility]: n/a    Comments:    Please advise patient replies from provider may take up to 48 hours.

## 2024-10-04 NOTE — TELEPHONE ENCOUNTER
Called and adalberto GARAY, Ms. Washington states she need to clarify which Carvedilol pt is taking and whether or not he is taking Naproxen and Simvastatin. Meds were reviewed with pt and wife yesterday and she stated that pt is taking Naproxen and Simvastatin.

## 2024-10-04 NOTE — TELEPHONE ENCOUNTER
Called and sp with Ms Washington from , Dr. Galvan confirmed pt should me taking naproxen as needed, continue simvastatin, and should be taking carvedilol 6.25 mg.

## 2024-10-07 ENCOUNTER — OUTPATIENT CASE MANAGEMENT (OUTPATIENT)
Dept: ADMINISTRATIVE | Facility: OTHER | Age: 89
End: 2024-10-07
Payer: MEDICARE

## 2024-10-07 NOTE — PROGRESS NOTES
10/14/2024  2nd attempt to complete Follow-Up for Outpatient Care Management, left message. Collaborated with OPCM RN.    10/7/2024  1st attempt to complete Follow-Up for Outpatient Care Management, left message. Attempt letter sent.

## 2024-10-07 NOTE — LETTER
Dwayne Saravia  [address] [facility] [address]      Dear Dwayne Saravia,     I am writing from the Outpatient Care Management Department at [facility]. I have been unsuccessful at reaching you since we spoke on Monday 9/30/2024.  I hope you found the assistance previously provided to you helpful.     Please contact me at [phone] from 8:00AM to 4:30 PM on Monday thru Friday.     As you know, [facility] also has a program with a nurse available 24/7 to answer questions or provide medical advice.  [facility] can be reached at [phone].    Sincerely,       Libertad Galdamez LCSW  Outpatient Care Management

## 2024-10-08 ENCOUNTER — OUTPATIENT CASE MANAGEMENT (OUTPATIENT)
Dept: ADMINISTRATIVE | Facility: OTHER | Age: 89
End: 2024-10-08
Payer: MEDICARE

## 2024-10-09 DIAGNOSIS — I10 PRIMARY HYPERTENSION: ICD-10-CM

## 2024-10-09 RX ORDER — LOSARTAN POTASSIUM 50 MG/1
50 TABLET ORAL DAILY
Qty: 28 TABLET | Refills: 11 | Status: CANCELLED | OUTPATIENT
Start: 2024-10-09 | End: 2025-10-09

## 2024-10-09 RX ORDER — CARVEDILOL 6.25 MG/1
6.25 TABLET ORAL 2 TIMES DAILY
Qty: 28 TABLET | Refills: 11 | Status: CANCELLED | OUTPATIENT
Start: 2024-10-09

## 2024-10-09 NOTE — PROGRESS NOTES
Outpatient Care Management  Plan of Care Follow Up Visit    Patient: Dwayne Saravia  MRN: [id number]  Date of Service: 10/08/2024  Completed by: Enedina Rodrigez RN  Referral Date: 09/16/2024    Reason for Visit   Patient presents with    Update Plan Of Care    OPCM Chart Review       Brief Summary:   OPCM RN follow-up call completed.   Continue education on Stroke/Safety. Provided option to spouse to have Dwayne's pills packed at [facility].  Next Steps: Patient is in agreement to follow-up call on or around 10/22/2024.

## 2024-10-21 NOTE — PROGRESS NOTES
Outpatient Care Management   - Care Plan Follow Up    Patient: Dwayne Saravia  MRN:  [id number]  Date of Service:  10/21/2024  Completed by:  Libertad Galdamez LCSW  Referral Date: 09/16/2024    Reason for Visit   Patient presents with    Other     10/14/2024  2nd attempt to complete Follow-Up for Outpatient Care Management, left message. Collaborated with OPCM RN.    10/7/2024  1st attempt to complete Follow-Up for Outpatient Care Management, left message. Attempt letter sent.    OPCM SW Follow Up Call     10/21/2024       Brief Summary: Spoke with patient's spouse, Charley, who confirms she received the mailed resources. Charley states she will begin reviewing the information and plans on calling to apply for the state waiver programs. SW encouraged Charley to call should she have any questions regarding the resources, Charley verbalized understanding. Patient has an appt with [facility] on Wednesday 10/30/24. Charley is agreeable for a follow up call in 2 weeks.    Complex Care Plan     Care plan was discussed and completed today with input from patient and/or caregiver.    Patient Instructions     No follow-ups on file.

## 2024-10-22 ENCOUNTER — OUTPATIENT CASE MANAGEMENT (OUTPATIENT)
Dept: ADMINISTRATIVE | Facility: OTHER | Age: 89
End: 2024-10-22
Payer: MEDICARE

## 2024-11-04 ENCOUNTER — OUTPATIENT CASE MANAGEMENT (OUTPATIENT)
Dept: ADMINISTRATIVE | Facility: OTHER | Age: 89
End: 2024-11-04
Payer: MEDICARE

## 2024-11-04 RX ORDER — NIFEDIPINE 60 MG/1
60 TABLET, EXTENDED RELEASE ORAL 2 TIMES DAILY
Qty: 60 TABLET | Refills: 1 | Status: SHIPPED | OUTPATIENT
Start: 2024-11-04

## 2024-11-04 NOTE — LETTER
Dwayne Saravia  [address] [facility] [address]      Dear Dwayne Saravia,     I am writing from the Outpatient Care Management Department at [facility]. I have been unsuccessful at reaching you since we spoke on Monday 10/21/2024.  I hope you found the assistance previously provided to you helpful.     Please contact me at [phone] from 8:00AM to 4:30 PM on Monday thru Friday.     As you know, [facility] also has a program with a nurse available 24/7 to answer questions or provide medical advice.  [facility] can be reached at [phone].    Sincerely,       Libertad Galdamez LCSW  Outpatient Care Management

## 2024-11-04 NOTE — PROGRESS NOTES
11/4/2024  1st attempt to complete Follow-Up for Outpatient Care Management, left message. Attempt letter sent.

## 2024-11-06 ENCOUNTER — EXTERNAL HOME HEALTH (OUTPATIENT)
Dept: HOME HEALTH SERVICES | Facility: HOSPITAL | Age: 89
End: 2024-11-06
Payer: MEDICARE

## 2024-11-11 NOTE — PROGRESS NOTES
Outpatient Care Management   - Care Plan Follow Up    Patient: Dwayne Saravia  MRN:  [id number]  Date of Service:  11/11/2024  Completed by:  Libertad Galdamez LCSW  Referral Date: 09/16/2024    Reason for Visit   Patient presents with    Other     11/4/2024  1st attempt to complete Follow-Up for Outpatient Care Management, left message. Attempt letter sent.    OPCM SW Follow Up Call     11/11/2024    Case Closure     11/11/2024       Brief Summary: Spoke with patient's spouse, Charley, who reports she had sent in an application for a ramp to [facility] [employer] for [employer]. Patient is reportedly on their waiting list. Charley reports she plans to call today to check his place on the list. SW encouraged Charely to call the state waiver programs to check patient's eligibility for more help at home. Charley reports she prefers to focus on getting the ramp built first before moving on to the next resource. Charley denies any further needs or questions at this time. SW will close case and notify OPCM RN.    Complex Care Plan     Care plan was discussed and completed today with input from patient and/or caregiver.    Patient Instructions     No follow-ups on file.

## 2024-11-13 ENCOUNTER — IMMUNIZATION (OUTPATIENT)
Dept: INTERNAL MEDICINE | Facility: CLINIC | Age: 89
End: 2024-11-13
Payer: MEDICARE

## 2024-11-13 ENCOUNTER — OFFICE VISIT (OUTPATIENT)
Dept: INTERNAL MEDICINE | Facility: CLINIC | Age: 89
End: 2024-11-13
Payer: MEDICARE

## 2024-11-13 ENCOUNTER — DOCUMENTATION ONLY (OUTPATIENT)
Dept: INTERNAL MEDICINE | Facility: CLINIC | Age: 89
End: 2024-11-13
Payer: MEDICARE

## 2024-11-13 VITALS
HEIGHT: 68 IN | SYSTOLIC BLOOD PRESSURE: 140 MMHG | WEIGHT: 152.13 LBS | HEART RATE: 63 BPM | BODY MASS INDEX: 23.05 KG/M2 | DIASTOLIC BLOOD PRESSURE: 84 MMHG

## 2024-11-13 DIAGNOSIS — N18.32 CKD STAGE G3B/A2, GFR 30-44 AND ALBUMIN CREATININE RATIO 30-299 MG/G: ICD-10-CM

## 2024-11-13 DIAGNOSIS — R26.89 GAIT, ANTALGIC: ICD-10-CM

## 2024-11-13 DIAGNOSIS — Z00.00 ENCOUNTER FOR PREVENTIVE HEALTH EXAMINATION: Primary | ICD-10-CM

## 2024-11-13 DIAGNOSIS — R80.1 PERSISTENT PROTEINURIA: ICD-10-CM

## 2024-11-13 DIAGNOSIS — I10 ESSENTIAL HYPERTENSION: ICD-10-CM

## 2024-11-13 DIAGNOSIS — G63 POLYNEUROPATHY IN DISEASES CLASSIFIED ELSEWHERE: ICD-10-CM

## 2024-11-13 DIAGNOSIS — R26.0 ATAXIC GAIT: ICD-10-CM

## 2024-11-13 DIAGNOSIS — N28.1 ACQUIRED CYST OF KIDNEY: ICD-10-CM

## 2024-11-13 DIAGNOSIS — I73.9 PERIPHERAL VASCULAR DISEASE, UNSPECIFIED: ICD-10-CM

## 2024-11-13 DIAGNOSIS — M81.0 AGE-RELATED OSTEOPOROSIS WITHOUT CURRENT PATHOLOGICAL FRACTURE: ICD-10-CM

## 2024-11-13 DIAGNOSIS — I42.9 CARDIOMYOPATHY, UNSPECIFIED TYPE: ICD-10-CM

## 2024-11-13 DIAGNOSIS — K21.9 GASTROESOPHAGEAL REFLUX DISEASE WITHOUT ESOPHAGITIS: ICD-10-CM

## 2024-11-13 DIAGNOSIS — N40.0 BENIGN PROSTATIC HYPERPLASIA WITHOUT LOWER URINARY TRACT SYMPTOMS: ICD-10-CM

## 2024-11-13 DIAGNOSIS — R26.9 ABNORMALITY OF GAIT AND MOBILITY: ICD-10-CM

## 2024-11-13 DIAGNOSIS — J43.8 OTHER EMPHYSEMA: ICD-10-CM

## 2024-11-13 DIAGNOSIS — I69.354 HEMIPARESIS AFFECTING LEFT SIDE AS LATE EFFECT OF STROKE: ICD-10-CM

## 2024-11-13 DIAGNOSIS — Z86.73 H/O: CVA (CEREBROVASCULAR ACCIDENT): ICD-10-CM

## 2024-11-13 DIAGNOSIS — Z23 NEED FOR VACCINATION: Primary | ICD-10-CM

## 2024-11-13 DIAGNOSIS — R27.8 OTHER LACK OF COORDINATION: ICD-10-CM

## 2024-11-13 DIAGNOSIS — Z87.891 FORMER TOBACCO USE: ICD-10-CM

## 2024-11-13 DIAGNOSIS — I69.354 HEMIPLEGIA AND HEMIPARESIS FOLLOWING CEREBRAL INFARCTION AFFECTING LEFT NON-DOMINANT SIDE: ICD-10-CM

## 2024-11-13 DIAGNOSIS — Z91.81 HISTORY OF FALLING: ICD-10-CM

## 2024-11-13 PROCEDURE — 1158F ADVNC CARE PLAN TLK DOCD: CPT | Mod: HCNC,CPTII,S$GLB,

## 2024-11-13 PROCEDURE — 3288F FALL RISK ASSESSMENT DOCD: CPT | Mod: HCNC,CPTII,S$GLB,

## 2024-11-13 PROCEDURE — 99999 PR PBB SHADOW E&M-EST. PATIENT-LVL III: CPT | Mod: PBBFAC,HCNC,,

## 2024-11-13 PROCEDURE — 1159F MED LIST DOCD IN RCRD: CPT | Mod: HCNC,CPTII,S$GLB,

## 2024-11-13 PROCEDURE — 1101F PT FALLS ASSESS-DOCD LE1/YR: CPT | Mod: HCNC,CPTII,S$GLB,

## 2024-11-13 PROCEDURE — 90653 IIV ADJUVANT VACCINE IM: CPT | Mod: HCNC,S$GLB,, | Performed by: INTERNAL MEDICINE

## 2024-11-13 PROCEDURE — 1170F FXNL STATUS ASSESSED: CPT | Mod: HCNC,CPTII,S$GLB,

## 2024-11-13 PROCEDURE — 1160F RVW MEDS BY RX/DR IN RCRD: CPT | Mod: HCNC,CPTII,S$GLB,

## 2024-11-13 PROCEDURE — G0008 ADMIN INFLUENZA VIRUS VAC: HCPCS | Mod: HCNC,S$GLB,, | Performed by: INTERNAL MEDICINE

## 2024-11-13 PROCEDURE — G0439 PPPS, SUBSEQ VISIT: HCPCS | Mod: HCNC,S$GLB,,

## 2024-11-13 PROCEDURE — 1126F AMNT PAIN NOTED NONE PRSNT: CPT | Mod: HCNC,CPTII,S$GLB,

## 2024-11-13 NOTE — PATIENT INSTRUCTIONS
Counseling and Referral of Other Preventative  (Italic type indicates deductible and co-insurance are waived)    Patient Name: Dwayne Saravia  Today's Date: 11/13/2024    Health Maintenance       Date Due Completion Date    TETANUS VACCINE Never done ---    COVID-19 Vaccine (11 - 2024-25 season) 09/01/2024 11/30/2022    Lipid Panel 09/22/2027 9/22/2022        No orders of the defined types were placed in this encounter.      The following information is provided to all patients.  This information is to help you find resources for any of the problems found today that may be affecting your health:                  Living healthy guide: www.[facility].[place].[facility]      Understanding Diabetes: www.diabetes.org      Eating healthy: www.cdc.gov/healthyweight      [facility] home safety checklist: www.cdc.gov/steadi/patient.html      Agency on Aging: www.goea.[place].[facility]      Alcoholics anonymous (AA): www.aa.org      Physical Activity: www.melissa.nih.gov/gc2nyrz      Tobacco use: www.quitwithusla.org

## 2024-11-13 NOTE — PROGRESS NOTES
"  Dwayne Saravia presented for a follow-up Medicare AWV today. The following components were reviewed and updated:    Medical history  Family History  Social history  Allergies and Current Medications  Health Risk Assessment  Health Maintenance  Care Team    **See Completed Assessments for Annual Wellness visit with in the encounter summary    The following assessments were completed:  Depression Screening  Cognitive function Screening  Timed Get Up Test  Whisper Test      Opioid documentation:      Patient does not have a current opioid prescription.          Vitals:    11/13/24 1117   BP: (!) 140/84   BP Location: Left arm   Patient Position: Sitting   Pulse: 63   Weight: 69 kg (152 lb 1.9 oz)   Height: 5' 8" (1.727 m)     Body mass index is 23.13 kg/m².       Physical Exam  Constitutional:       General: He is not in acute distress.     Appearance: Normal appearance. He is normal weight. He is not ill-appearing or toxic-appearing.   HENT:      Head: Normocephalic and atraumatic.      Right Ear: Tympanic membrane and ear canal normal.      Left Ear: Tympanic membrane and ear canal normal.      Nose: Nose normal.      Mouth/Throat:      Mouth: Mucous membranes are moist.      Pharynx: Oropharynx is clear.   Eyes:      Extraocular Movements: Extraocular movements intact.      Pupils: Pupils are equal, round, and reactive to light.   Cardiovascular:      Rate and Rhythm: Normal rate.      Pulses: Normal pulses.   Pulmonary:      Effort: Pulmonary effort is normal.      Breath sounds: Normal breath sounds.   Abdominal:      General: There is no distension.      Palpations: Abdomen is soft.      Tenderness: There is no abdominal tenderness.   Musculoskeletal:      Cervical back: Normal range of motion and neck supple.      Right lower leg: No edema.      Left lower leg: No edema.   Neurological:      Mental Status: He is alert and oriented to person, place, and time.           Diagnoses and health risks identified " today and associated recommendations/orders:  1. Encounter for preventive health examination  All age-related screenings and Hm measures discussed with patient.     2. H/O: CVA (cerebrovascular accident)  Stable. Cont coreg, losartan, nifedipine, zocor, aspirin as prescribed.    3. Hemiparesis affecting left side as late effect of stroke  Stable. Recommend regular exercise as tolerated, use assistive devices for ambulation.    4. Hemiplegia and hemiparesis following cerebral infarction affecting left non-dominant side  Stable. Regular activity as tolerated, use assistive devices for ambulation.    5. Polyneuropathy in diseases classified elsewhere  Stable. No meds.    6. Ataxic gait  Stable. Using rollator    7. Other lack of coordination  Stable. Using rollator.    8. Other emphysema  Stable. On combivent    9. Essential hypertension  Stable. On coreg, losartan, nifedipine.    10. Cardiomyopathy, unspecified type  Stable. F/w card    11. Peripheral vascular disease, unspecified  Stable. Recommend compression socks, regular activity as tolerated.     12. Benign prostatic hyperplasia without lower urinary tract symptoms  Stable. No meds.    13. Acquired cyst of kidney  Stable. F/w Uro    14. Persistent proteinuria  Stable. F/w Neph    15. 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 F/w neph    16. Age-related osteoporosis without current pathological fracture  Stable. On d3. Regular exercise as tolerated.    17. Gastroesophageal reflux disease without esophagitis  Stable. No meds.    18. History of falling  Stable. Regular exercise as tolerated, use assistive devices    19. Gait, antalgic  Stable. Regular activity as tolerated. Use assistive devices.     20. Former tobacco use  Stable. Encouraged to cont cessation    21. Abnormality of gait and mobility  Stable. Regular activity as tolerated, use assistive devices.      Provided Dwayne with a 5-10 year written screening schedule and  personal prevention plan. Recommendations were developed using the USPSTF age appropriate recommendations. Education, counseling, and referrals were provided as needed.  After Visit Summary printed and given to patient which includes a list of additional screenings\tests needed.    Follow up in about 1 year (around 11/13/2025) for Annual Medicare Wellness Visit.      Violet Bello NP  I offered to discuss advanced care planning, including how to pick a person who would make decisions for you if you were unable to make them for yourself, called a health care power of , and what kind of decisions you might make such as use of life sustaining treatments such as ventilators and tube feeding when faced with a life limiting illness recorded on a living will that they will need to know. (How you want to be cared for as you near the end of your natural life)     X Patient is interested in learning more about how to make advanced directives.  I provided them paperwork and offered to discuss this with them.

## 2024-11-26 ENCOUNTER — OUTPATIENT CASE MANAGEMENT (OUTPATIENT)
Dept: ADMINISTRATIVE | Facility: OTHER | Age: 89
End: 2024-11-26
Payer: MEDICARE

## 2024-11-26 DIAGNOSIS — I10 PRIMARY HYPERTENSION: ICD-10-CM

## 2024-11-26 DIAGNOSIS — J30.89 ENVIRONMENTAL AND SEASONAL ALLERGIES: ICD-10-CM

## 2024-11-26 DIAGNOSIS — M81.0 AGE-RELATED OSTEOPOROSIS WITHOUT CURRENT PATHOLOGICAL FRACTURE: ICD-10-CM

## 2024-11-26 DIAGNOSIS — J44.9 CHRONIC OBSTRUCTIVE PULMONARY DISEASE, UNSPECIFIED COPD TYPE: ICD-10-CM

## 2024-11-26 DIAGNOSIS — I69.30 HISTORY OF STROKE WITH RESIDUAL DEFICIT: Primary | ICD-10-CM

## 2024-11-26 NOTE — TELEPHONE ENCOUNTER
----- Message from  Enedina sent at 11/26/2024  3:49 PM CST -----  Regarding: Request for PCP to send all rxs to [facility] for pill packing  Dr. Frieda Galvan, PCP/Staff:    Mr. Saravia's spouse, Charley Saravia started process with the [facility] to have patient's meds pill packed.  What is needed though is for the PCP to send all rxs to the [facility] to fill.     Please advise. Thank you.     Enedina Rodrigez, BSN, RN, CCM [facility]  [email or web address]  TEL:  [phone]

## 2024-11-26 NOTE — PROGRESS NOTES
Outpatient Care Management  Plan of Care Follow Up Visit    Patient: Dwayne Saravia  MRN: [id number]  Date of Service: 11/26/2024  Completed by: Enedina Rodrigez RN  Referral Date: 09/16/2024    Reason for Visit   Patient presents with    Update Plan Of Care    OPCM Chart Review       Brief Summary:   OPCM RN follow-up call completed.   Continue education on Stroke.   Next Steps: Patient is in agreement to follow-up call on or around 12/3/2024.

## 2024-11-26 NOTE — TELEPHONE ENCOUNTER
"Please see outpatient care msg, pt and wife started pill packing process, requesting all Rxs be sent to [facility] to be packed    LOV 10/03/24    NOV 12/18/24    Called pt and wife to verify which meds pt is taking, pt picked up the phone but interrupted my introduction by yelling "I'm on a special call" and hung up the phone.  "

## 2024-11-26 NOTE — TELEPHONE ENCOUNTER
No care due was identified.  [facility] Embedded Care Due Messages. Reference number: [id number].   11/26/2024 4:29:43 PM CST

## 2024-11-30 PROCEDURE — G0179 MD RECERTIFICATION HHA PT: HCPCS | Mod: ,,, | Performed by: STUDENT IN AN ORGANIZED HEALTH CARE EDUCATION/TRAINING PROGRAM

## 2024-12-05 RX ORDER — CARVEDILOL 6.25 MG/1
6.25 TABLET ORAL 2 TIMES DAILY
Qty: 180 TABLET | Refills: 0 | Status: SHIPPED | OUTPATIENT
Start: 2024-12-05

## 2024-12-05 RX ORDER — NIFEDIPINE 60 MG/1
60 TABLET, EXTENDED RELEASE ORAL 2 TIMES DAILY
Qty: 60 TABLET | Refills: 0 | Status: SHIPPED | OUTPATIENT
Start: 2024-12-05

## 2024-12-05 RX ORDER — CHOLECALCIFEROL (VITAMIN D3) 25 MCG
1000 TABLET ORAL DAILY
Qty: 90 TABLET | Refills: 0 | Status: SHIPPED | OUTPATIENT
Start: 2024-12-05

## 2024-12-05 RX ORDER — LEVOCETIRIZINE DIHYDROCHLORIDE 5 MG/1
5 TABLET, FILM COATED ORAL NIGHTLY
Qty: 90 TABLET | Refills: 0 | Status: SHIPPED | OUTPATIENT
Start: 2024-12-05

## 2024-12-05 RX ORDER — SIMVASTATIN 40 MG/1
40 TABLET, FILM COATED ORAL DAILY
Qty: 90 TABLET | Refills: 0 | Status: SHIPPED | OUTPATIENT
Start: 2024-12-05

## 2024-12-05 RX ORDER — LOSARTAN POTASSIUM 50 MG/1
50 TABLET ORAL DAILY
Qty: 90 TABLET | Refills: 0 | Status: SHIPPED | OUTPATIENT
Start: 2024-12-05 | End: 2025-03-05

## 2024-12-05 RX ORDER — NAPROXEN SODIUM 220 MG/1
81 TABLET, FILM COATED ORAL DAILY
Qty: 90 TABLET | Refills: 0 | Status: SHIPPED | OUTPATIENT
Start: 2024-12-05

## 2024-12-11 ENCOUNTER — OUTPATIENT CASE MANAGEMENT (OUTPATIENT)
Dept: ADMINISTRATIVE | Facility: OTHER | Age: 89
End: 2024-12-11
Payer: MEDICARE

## 2024-12-19 ENCOUNTER — TELEPHONE (OUTPATIENT)
Dept: PRIMARY CARE CLINIC | Facility: CLINIC | Age: 89
End: 2024-12-19
Payer: MEDICARE

## 2024-12-19 ENCOUNTER — OUTPATIENT CASE MANAGEMENT (OUTPATIENT)
Dept: ADMINISTRATIVE | Facility: OTHER | Age: 89
End: 2024-12-19
Payer: MEDICARE

## 2024-12-19 NOTE — TELEPHONE ENCOUNTER
----- Message from  Enedina sent at 12/19/2024  2:56 PM CST -----  Regarding: Need to reschedule missed visit  Dr. Frieda Galvan, PCP/Staff:    Unfortunately, Mr. Saravia's spouse, Charley was unaware of pt's appt 12/18/2024. Her cell phone ([phone]) has not been working and they are not on [facility].     Please advise on rescheduling the missed appt.   Charley Saravia can be reached on the land line [phone].     The visit was important in order to for PCP to see patient before sending rxs [facility] to be pill [facility].     Thank you,  DEJA CotterN, RN, CCM [facility]  [email or web address]  TEL:  [phone]

## 2024-12-20 NOTE — PROGRESS NOTES
Outpatient Care Management  Plan of Care Follow Up Visit    Patient: Dwayne Saravia  MRN: [id number]  Date of Service: 12/19/2024  Completed by: Enedina Rodrigez RN  Referral Date: 09/16/2024    Reason for Visit   Patient presents with    Update Plan Of Care    OPCM Chart Review       Brief Summary:   OPCM RN follow-up call completed.   Continue education on Stroke Prevention.  Next Steps: Patient is in agreement to follow-up call on or around 1/7/2025.

## 2024-12-27 ENCOUNTER — OFFICE VISIT (OUTPATIENT)
Dept: PRIMARY CARE CLINIC | Facility: CLINIC | Age: 89
End: 2024-12-27
Payer: MEDICARE

## 2024-12-27 VITALS
HEIGHT: 68 IN | WEIGHT: 153.25 LBS | BODY MASS INDEX: 23.22 KG/M2 | SYSTOLIC BLOOD PRESSURE: 104 MMHG | HEART RATE: 91 BPM | RESPIRATION RATE: 18 BRPM | DIASTOLIC BLOOD PRESSURE: 78 MMHG | OXYGEN SATURATION: 92 %

## 2024-12-27 DIAGNOSIS — J30.89 ENVIRONMENTAL AND SEASONAL ALLERGIES: ICD-10-CM

## 2024-12-27 DIAGNOSIS — R26.89 DECREASED MOBILITY: Primary | ICD-10-CM

## 2024-12-27 DIAGNOSIS — I69.30 HISTORY OF STROKE WITH RESIDUAL DEFICIT: ICD-10-CM

## 2024-12-27 DIAGNOSIS — I10 PRIMARY HYPERTENSION: ICD-10-CM

## 2024-12-27 PROCEDURE — 1160F RVW MEDS BY RX/DR IN RCRD: CPT | Mod: HCNC,CPTII,S$GLB, | Performed by: STUDENT IN AN ORGANIZED HEALTH CARE EDUCATION/TRAINING PROGRAM

## 2024-12-27 PROCEDURE — 99999 PR PBB SHADOW E&M-EST. PATIENT-LVL IV: CPT | Mod: PBBFAC,HCNC,, | Performed by: STUDENT IN AN ORGANIZED HEALTH CARE EDUCATION/TRAINING PROGRAM

## 2024-12-27 PROCEDURE — 1126F AMNT PAIN NOTED NONE PRSNT: CPT | Mod: HCNC,CPTII,S$GLB, | Performed by: STUDENT IN AN ORGANIZED HEALTH CARE EDUCATION/TRAINING PROGRAM

## 2024-12-27 PROCEDURE — 99214 OFFICE O/P EST MOD 30 MIN: CPT | Mod: HCNC,S$GLB,, | Performed by: STUDENT IN AN ORGANIZED HEALTH CARE EDUCATION/TRAINING PROGRAM

## 2024-12-27 PROCEDURE — 1159F MED LIST DOCD IN RCRD: CPT | Mod: HCNC,CPTII,S$GLB, | Performed by: STUDENT IN AN ORGANIZED HEALTH CARE EDUCATION/TRAINING PROGRAM

## 2024-12-27 RX ORDER — LOSARTAN POTASSIUM 50 MG/1
50 TABLET ORAL DAILY
Qty: 90 TABLET | Refills: 1 | Status: SHIPPED | OUTPATIENT
Start: 2024-12-27

## 2024-12-27 RX ORDER — SIMVASTATIN 40 MG/1
40 TABLET, FILM COATED ORAL DAILY
Qty: 90 TABLET | Refills: 1 | Status: SHIPPED | OUTPATIENT
Start: 2024-12-27

## 2024-12-27 RX ORDER — NIFEDIPINE 60 MG/1
60 TABLET, EXTENDED RELEASE ORAL 2 TIMES DAILY
Qty: 180 TABLET | Refills: 0 | Status: SHIPPED | OUTPATIENT
Start: 2024-12-27

## 2024-12-27 RX ORDER — NAPROXEN SODIUM 220 MG/1
81 TABLET, FILM COATED ORAL DAILY
Qty: 90 TABLET | Refills: 0 | Status: CANCELLED | OUTPATIENT
Start: 2024-12-27

## 2024-12-27 RX ORDER — CARVEDILOL 6.25 MG/1
6.25 TABLET ORAL 2 TIMES DAILY
Qty: 180 TABLET | Refills: 1 | Status: SHIPPED | OUTPATIENT
Start: 2024-12-27

## 2024-12-27 RX ORDER — LEVOCETIRIZINE DIHYDROCHLORIDE 5 MG/1
5 TABLET, FILM COATED ORAL NIGHTLY
Qty: 90 TABLET | Refills: 1 | Status: SHIPPED | OUTPATIENT
Start: 2024-12-27

## 2024-12-27 NOTE — PROGRESS NOTES
Office visit  Patient: Mr. Dwayne Saravia   12/27/2024       Assessment/Plan:       1. Decreased mobility  -     Ambulatory referral/consult to Physical Medicine Rehab; Future; Expected date: 01/03/2025        -needs to reschedule motor scooter evaluation with PM and R    2. Primary hypertension  -     carvediloL (COREG) 6.25 MG tablet; Take 1 tablet (6.25 mg total) by mouth 2 (two) times daily.  Dispense: 180 tablet; Refill: 1  -     losartan (COZAAR) 50 MG tablet; Take 1 tablet (50 mg total) by mouth once daily.  Dispense: 90 tablet; Refill: 1  -     NIFEdipine (PROCARDIA-XL) 60 MG (OSM) 24 hr tablet; Take 1 tablet (60 mg total) by mouth 2 (two) times a day.  Dispense: 180 tablet; Refill: 0        -stable, continue current medication    3. History of stroke with residual deficit       -stable, continue to monitor    4. Environmental and seasonal allergies  -     levocetirizine (XYZAL) 5 MG tablet; Take 1 tablet (5 mg total) by mouth every evening.  Dispense: 90 tablet; Refill: 1    Other orders  -     simvastatin (ZOCOR) 40 MG tablet; Take 1 tablet (40 mg total) by mouth once daily.  Dispense: 90 tablet; Refill: 1      Return to clinic in 3 months or sooner as needed.        CHIEF COMPLAINT: follow up     HPI: Dwayne Saravia is a 89 y.o. male who presents for follow up of hypertension, decreased mobility, and seasonal allergies.  His last appointment with me was on 10/03/2024. He was supposed to see me last week, but he missed that appointment because his wife had a fall.    He has been working with [facility] to walk up and down the stairs better.    He is accompanied by his wife. She states that he breathes hard with exertion. Was supposed to be on oxygen at night but he refused to use it.      Review of Systems   Respiratory:  Positive for shortness of breath. Negative for cough and wheezing.    Cardiovascular:  Negative for chest pain and palpitations.   Gastrointestinal:  Negative for abdominal pain,  "constipation, diarrhea, nausea and vomiting.   Neurological:  Positive for weakness.         Current Outpatient Medications   Medication Instructions    aspirin 81 mg, Oral, Daily    carvediloL (COREG) 6.25 mg, Oral, 2 times daily    fluticasone furoate-vilanteroL (BREO ELLIPTA) 100-25 mcg/dose diskus inhaler 1 puff, Inhalation, Daily, Controller    ipratropium-albuteroL (COMBIVENT)  mcg/actuation inhaler 1 puff, Inhalation, Every 6 hours PRN, Rescue    levocetirizine (XYZAL) 5 mg, Oral, Nightly    losartan (COZAAR) 50 mg, Oral, Daily    NIFEdipine (PROCARDIA-XL) 60 mg, Oral, 2 times daily    omega-3 acid ethyl esters (LOVAZA) 2 g    simvastatin (ZOCOR) 40 mg, Oral, Daily    vitamin D (VITAMIN D3) 1,000 Units, Oral, Daily       Lab Results   Component Value Date    HGBA1C 5.7 (H) 10/24/2023    HGBA1C 6.1 (H) 08/28/2020     No results found for: "MICALBCREAT"  Lab Results   Component Value Date    LDLCALC 61.6 (L) 05/10/2021    LDLCALC 63.2 08/28/2020    CHOL 122 05/10/2021    HDL 41 05/10/2021    TRIG 97 05/10/2021       Lab Results   Component Value Date     09/13/2024    K 5.6 (H) 09/13/2024     (H) 09/13/2024    CO2 20 (L) 09/13/2024     09/13/2024    BUN 19 09/13/2024    CREATININE 1.6 (H) 09/13/2024    CALCIUM 9.0 09/13/2024    PROT 7.9 09/13/2024    ALBUMIN 3.3 (L) 09/13/2024    BILITOT 0.4 09/13/2024    ALKPHOS 101 09/13/2024    AST 26 09/13/2024    ALT 10 09/13/2024    ANIONGAP 9 09/13/2024    ESTGFRAFRICA 57.1 (A) 05/10/2021    EGFRNONAA 49.4 (A) 05/10/2021    WBC 6.09 09/13/2024    HGB 13.5 (L) 09/13/2024    HGB 11.8 (L) 04/13/2024    HCT 41.7 09/13/2024    MCV 95 09/13/2024     09/13/2024    TSH 1.124 04/10/2024    PSA 22 (H) 11/19/2008    HEPCAB Negative 10/23/2023       Lab Results   Component Value Date    OLRQNGYA84 698 11/20/2008    FERRITIN 462 (H) 11/20/2008    IRON 101 11/20/2008    TIBC 291 11/20/2008    FESATURATED 35 11/20/2008         Past Medical History: " "  Diagnosis Date    Arthritis     BPH (benign prostatic hyperplasia)     CVA (cerebral vascular accident) 2007    Hypertension      Past Surgical History:   Procedure Laterality Date    PROSTATE SURGERY      2016       Social History     Tobacco Use    Smoking status: Former     Current packs/day: 0.00     Types: Cigarettes     Start date: 1950     Quit date: 2018     Years since quittin.9     Passive exposure: Past    Smokeless tobacco: Never   Substance Use Topics    Alcohol use: No    Drug use: No       family history includes Cancer in his brother; Hypertension in his brother.    Vitals:    24 1025   BP: 104/78   Pulse: 91   Resp: 18   SpO2: (!) 92%   Weight: 69.5 kg (153 lb 3.5 oz)   Height: 5' 8" (1.727 m)   PainSc: 0-No pain     Objective:   Physical Exam  Vitals reviewed.   Constitutional:       General: He is not in acute distress.     Appearance: Normal appearance. He is well-developed.   HENT:      Head: Normocephalic and atraumatic.      Right Ear: Hearing and external ear normal. No decreased hearing noted. No drainage or swelling.      Left Ear: Hearing and external ear normal. No decreased hearing noted. No drainage or swelling.      Nose: Nose normal. No rhinorrhea.      Mouth/Throat:      Mouth: Mucous membranes are moist.   Eyes:      General: Lids are normal.         Right eye: No discharge.         Left eye: No discharge.      Conjunctiva/sclera: Conjunctivae normal.      Right eye: Right conjunctiva is not injected. No exudate.     Left eye: Left conjunctiva is not injected. No exudate.  Cardiovascular:      Rate and Rhythm: Normal rate and regular rhythm.      Heart sounds: Normal heart sounds. No murmur heard.     No friction rub. No gallop.   Pulmonary:      Effort: Pulmonary effort is normal. No respiratory distress.      Breath sounds: No stridor. No wheezing, rhonchi or rales.   Musculoskeletal:         General: No deformity.      Right lower leg: No edema.      Left " lower leg: No edema.   Skin:     General: Skin is warm and dry.   Neurological:      Mental Status: He is alert.   Psychiatric:         Mood and Affect: Mood normal.         Speech: Speech normal.         Behavior: Behavior normal.             Frieda Galvan MD  Internal Medicine and Pediatrics

## 2025-01-07 ENCOUNTER — OUTPATIENT CASE MANAGEMENT (OUTPATIENT)
Dept: ADMINISTRATIVE | Facility: OTHER | Age: OVER 89
End: 2025-01-07
Payer: MEDICARE

## 2025-01-09 ENCOUNTER — OUTPATIENT CASE MANAGEMENT (OUTPATIENT)
Dept: ADMINISTRATIVE | Facility: OTHER | Age: OVER 89
End: 2025-01-09
Payer: MEDICARE

## 2025-01-09 NOTE — PROGRESS NOTES
Outpatient Care Management  Plan of Care Follow Up Visit    Patient: Dwayne Saravia  MRN: [id number]  Date of Service: 01/09/2025  Completed by: Enedina Rodrigez RN  Referral Date: 09/16/2024    Reason for Visit   Patient presents with    Update Plan Of Care    OPCM Chart Review    Case Closure       Brief Summary:   OPCM RN follow-up call completed.   Completed education on Stroke/Safety.  Patient/spouse in agreement with case closure.   Case closed.

## 2025-01-09 NOTE — LETTER
January 9, 2025    Dwayne Saravia  [address] [place] [address]             [facility] [address] [place] [address] Dear Mr. And . Dwayne Saravia:    Enclosed are some possible resources that help Seniors with ramps & their installations.     If you have any questions or concerns, please don't hesitate to call me.     Sincerely,        Enedina Rodrigez RN Maria Molina, BSN, RN, CCM [facility]  [email or web address]  TEL:  [phone]

## 2025-01-10 ENCOUNTER — EXTERNAL HOME HEALTH (OUTPATIENT)
Dept: HOME HEALTH SERVICES | Facility: HOSPITAL | Age: OVER 89
End: 2025-01-10
Payer: MEDICARE

## 2025-02-05 ENCOUNTER — OFFICE VISIT (OUTPATIENT)
Dept: PHYSICAL MEDICINE AND REHAB | Facility: CLINIC | Age: OVER 89
End: 2025-02-05
Payer: COMMERCIAL

## 2025-02-05 VITALS — BODY MASS INDEX: 23.12 KG/M2 | HEIGHT: 68 IN | WEIGHT: 152.56 LBS | OXYGEN SATURATION: 98 %

## 2025-02-05 DIAGNOSIS — I69.354 HEMIPARESIS AFFECTING LEFT SIDE AS LATE EFFECT OF CEREBROVASCULAR ACCIDENT (CVA): ICD-10-CM

## 2025-02-05 DIAGNOSIS — Z99.81 HISTORY OF HOME OXYGEN THERAPY: ICD-10-CM

## 2025-02-05 DIAGNOSIS — R20.0 NUMBNESS OF FEET: ICD-10-CM

## 2025-02-05 DIAGNOSIS — J43.8 OTHER EMPHYSEMA: ICD-10-CM

## 2025-02-05 DIAGNOSIS — Z91.81 HISTORY OF FALLING: ICD-10-CM

## 2025-02-05 DIAGNOSIS — R20.0 NUMBNESS OF FINGERS OF BOTH HANDS: ICD-10-CM

## 2025-02-05 DIAGNOSIS — R26.9 GAIT DISORDER: Primary | ICD-10-CM

## 2025-02-05 DIAGNOSIS — R06.09 DOE (DYSPNEA ON EXERTION): ICD-10-CM

## 2025-02-05 DIAGNOSIS — I42.9 CARDIOMYOPATHY, UNSPECIFIED TYPE: ICD-10-CM

## 2025-02-05 DIAGNOSIS — R54 AGE-RELATED PHYSICAL DEBILITY: ICD-10-CM

## 2025-02-05 PROCEDURE — 99204 OFFICE O/P NEW MOD 45 MIN: CPT | Mod: S$GLB,,, | Performed by: PHYSICAL MEDICINE & REHABILITATION

## 2025-02-05 PROCEDURE — 3288F FALL RISK ASSESSMENT DOCD: CPT | Mod: CPTII,S$GLB,, | Performed by: PHYSICAL MEDICINE & REHABILITATION

## 2025-02-05 PROCEDURE — 99999 PR PBB SHADOW E&M-EST. PATIENT-LVL III: CPT | Mod: PBBFAC,,, | Performed by: PHYSICAL MEDICINE & REHABILITATION

## 2025-02-05 PROCEDURE — 1101F PT FALLS ASSESS-DOCD LE1/YR: CPT | Mod: CPTII,S$GLB,, | Performed by: PHYSICAL MEDICINE & REHABILITATION

## 2025-02-05 PROCEDURE — 1126F AMNT PAIN NOTED NONE PRSNT: CPT | Mod: CPTII,S$GLB,, | Performed by: PHYSICAL MEDICINE & REHABILITATION

## 2025-02-05 PROCEDURE — 1159F MED LIST DOCD IN RCRD: CPT | Mod: CPTII,S$GLB,, | Performed by: PHYSICAL MEDICINE & REHABILITATION

## 2025-02-05 NOTE — PROGRESS NOTES
Subjective:       Patient ID: Dwayne Saravia is a 89 y.o. male.    Chief Complaint: No chief complaint on file.      HPI    Mrs. Saravia is a 89-year-old male with multiple medical problems who is presenting to Physical Medicine Clinic for evaluation for a power mobility device.  His past medical history significant for hypertension, cardiomyopathy, emphysema, home oxygen therapy, CVA with left hemiparesis few years ago, peripheral neuropathy, osteoporosis.  He is coming today to the clinic accompanied by his daughter.    The patient lives with his wife in a single-story home with few steps to enter.  He is independent with feeding, dressing, toileting and bathing with a walk-in shower and shower chair but these activities take him a long time.  He ambulates with a Rollator walker.  He is restricted to about 20-30 feet due to shortness of breath, left lower extremity weakness, bilateral foot numbness.  Reports history of multiple near falls and a significant fall about 8 months ago but so far without serious injuries.  He can not propel a manual wheelchair due to shortness of breath, left arm weakness and bilateral hand numbness.  He reports trying a scooter in department stores and being able to ride them and maneuver them without significant problems.    Past Medical History:   Diagnosis Date    Arthritis     BPH (benign prostatic hyperplasia)     CVA (cerebral vascular accident) 2007    Hypertension         Review of patient's allergies indicates:   Allergen Reactions    Keflex [cephalexin]         Review of Systems   Constitutional:  Positive for fatigue.   Eyes:  Negative for visual disturbance.   Respiratory:  Positive for shortness of breath.    Cardiovascular:  Negative for chest pain.   Gastrointestinal:  Negative for nausea and vomiting.   Genitourinary:  Negative for bladder incontinence.   Musculoskeletal:  Positive for gait problem. Negative for arthralgias, back pain and neck pain.    Neurological:  Positive for weakness and numbness. Negative for dizziness and headaches.   Psychiatric/Behavioral:  Negative for behavioral problems.              Objective:      Physical Exam  Vitals reviewed.   Constitutional:       General: He is not in acute distress.     Appearance: He is well-developed.   HENT:      Head: Normocephalic and atraumatic.   Cardiovascular:      Rate and Rhythm: Normal rate and regular rhythm.      Heart sounds: Normal heart sounds.   Pulmonary:      Breath sounds: Normal breath sounds.   Abdominal:      Palpations: Abdomen is soft.   Musculoskeletal:      Cervical back: Normal range of motion.      Comments: BUE:  ROM:   RUE: full.   LUE: full.  Strength:    RUE: 4/5 at shoulder abduction, 5 elbow flexion, 5 elbow extension, 5 hand .   LUE: 4-/5 at shoulder abduction, 4 elbow flexion, 4 elbow extension, 4 hand .  Sensation to pinprick:   RUE: intact.   LUE: intact.  DTR:    RUE: +1 biceps, +1 triceps.   LUE:  +2 biceps, +2 triceps.      BLE:  ROM:   RLE: full.   LLE: full.  Knees:   RLE: +ve severe crepitus.   LLE: +ve severe crepitus.   Strength:    RLE: 4-/5 at hip flexion, 5 knee extension, 5 ankle DF, 5 ankle PF.   LLE: 3/5 at hip flexion, 5- knee extension, 5- ankle DF, 5- ankle PF.  Sensation to pinprick:     RLE: intact.      LLE: intact.   DTR:     RLE: +1 knee, +1 ankle.    LLE: +2 knee, +1 ankle.      -ve tenderness over lumbar spine.    Gait:  Slow mima, stooped posture, shuffling, using a Rollator walker     Skin:     Findings: No rash.   Neurological:      Mental Status: He is alert.   Psychiatric:         Behavior: Behavior normal.       Assessment:         ICD-10-CM ICD-9-CM   1. Gait disorder  R26.9 781.2   2. Other emphysema  J43.8 492.8   3. History of home oxygen therapy  Z99.81 V46.2   4. Cardiomyopathy, unspecified type  I42.9 425.4   5. MIDDLETON (dyspnea on exertion)  R06.09 786.09   6. Hemiparesis affecting left side as late effect of cerebrovascular  accident (CVA)  I69.354 438.20   7. Numbness of feet  R20.0 782.0   8. Numbness of fingers of both hands  R20.0 782.0   9. History of falling  Z91.81 V15.88   10. Age-related physical debility  R54 797         Summary/Plan:             - The patient was seen today for mobility evaluation for a power mobility device due to significant impairment at home.  - The patient has multifactorial gait impairment.  - The patient is not able to ambulate safely at home.  - The patient is unable to use a walker functional distances due to MIDDLETON because of emphysema and cardiomyopathy, left lower extremity weakness due to CVA, I had right proximal lower extremity weakness due to debility, bilateral foot numbness due to neuropathy..  - The patient is unable to use an optimally-configured manual wheelchair at home due to MIDDLETON because of emphysema cardiomyopathy, left upper extremity weakness due to CVA, bilateral hand numbness due to neuropathy.  - He reports history of near falls and occasional falls, which could be detrimental to his health due to osteoporosis.  - The patient has intact cognition and should be able to use a power mobility device well at home.  - A prescription for an electric scooter was generated.  - The patient has enough upper & lower extremity strength to be able to transfer to and from the power mobility device.  - The patient has enough range of motion & strength in BUE to allow functional operation of the tiller.  - The patient has good trunk balance and should be able to maintain a safe posture while operating a power mobility device.  He has been able to ride a scooter in department stores.  - This will allow the patient to go safely to the kitchen, dining room or living room for feeding & socialization.  It should also help with energy conservation and reducing the risk of falls.  - The patient is to return the Physical Medicine/Mobility clinic prn.        This note was partly generated with M*Modal voice  recognition software. I apologize for any possible typographical errors.

## 2025-03-24 DIAGNOSIS — Z00.00 ENCOUNTER FOR MEDICARE ANNUAL WELLNESS EXAM: ICD-10-CM

## 2025-03-27 ENCOUNTER — LAB VISIT (OUTPATIENT)
Dept: LAB | Facility: HOSPITAL | Age: OVER 89
End: 2025-03-27
Attending: STUDENT IN AN ORGANIZED HEALTH CARE EDUCATION/TRAINING PROGRAM
Payer: MEDICARE

## 2025-03-27 ENCOUNTER — OFFICE VISIT (OUTPATIENT)
Dept: PRIMARY CARE CLINIC | Facility: CLINIC | Age: OVER 89
End: 2025-03-27
Payer: MEDICARE

## 2025-03-27 VITALS
BODY MASS INDEX: 23.49 KG/M2 | DIASTOLIC BLOOD PRESSURE: 86 MMHG | HEIGHT: 68 IN | OXYGEN SATURATION: 93 % | WEIGHT: 155 LBS | SYSTOLIC BLOOD PRESSURE: 108 MMHG | RESPIRATION RATE: 19 BRPM | HEART RATE: 62 BPM

## 2025-03-27 DIAGNOSIS — N40.0 BENIGN PROSTATIC HYPERPLASIA WITHOUT LOWER URINARY TRACT SYMPTOMS: ICD-10-CM

## 2025-03-27 DIAGNOSIS — G63 POLYNEUROPATHY IN DISEASES CLASSIFIED ELSEWHERE: ICD-10-CM

## 2025-03-27 DIAGNOSIS — I50.32 CHRONIC DIASTOLIC HEART FAILURE: ICD-10-CM

## 2025-03-27 DIAGNOSIS — Z86.73 H/O: CVA (CEREBROVASCULAR ACCIDENT): ICD-10-CM

## 2025-03-27 DIAGNOSIS — R26.0 ATAXIC GAIT: ICD-10-CM

## 2025-03-27 DIAGNOSIS — N18.32 CKD STAGE G3B/A2, GFR 30-44 AND ALBUMIN CREATININE RATIO 30-299 MG/G: ICD-10-CM

## 2025-03-27 DIAGNOSIS — R26.89 DECREASED MOBILITY: ICD-10-CM

## 2025-03-27 DIAGNOSIS — I50.32 CHRONIC DIASTOLIC HEART FAILURE: Primary | ICD-10-CM

## 2025-03-27 DIAGNOSIS — I71.40 ABDOMINAL AORTIC ANEURYSM (AAA) 3.0 CM TO 5.5 CM IN DIAMETER IN MALE: ICD-10-CM

## 2025-03-27 DIAGNOSIS — J43.8 OTHER EMPHYSEMA: ICD-10-CM

## 2025-03-27 LAB
ABSOLUTE EOSINOPHIL (OHS): 0.06 K/UL
ABSOLUTE MONOCYTE (OHS): 0.97 K/UL (ref 0.3–1)
ABSOLUTE NEUTROPHIL COUNT (OHS): 2.4 K/UL (ref 1.8–7.7)
ALBUMIN SERPL BCP-MCNC: 3.1 G/DL (ref 3.5–5.2)
ALP SERPL-CCNC: 103 UNIT/L (ref 40–150)
ALT SERPL W/O P-5'-P-CCNC: 8 UNIT/L (ref 10–44)
ANION GAP (OHS): 7 MMOL/L (ref 8–16)
AST SERPL-CCNC: 19 UNIT/L (ref 11–45)
BASOPHILS # BLD AUTO: 0.01 K/UL
BASOPHILS NFR BLD AUTO: 0.2 %
BILIRUB SERPL-MCNC: 0.4 MG/DL (ref 0.1–1)
BUN SERPL-MCNC: 24 MG/DL (ref 8–23)
CALCIUM SERPL-MCNC: 8.7 MG/DL (ref 8.7–10.5)
CHLORIDE SERPL-SCNC: 110 MMOL/L (ref 95–110)
CHOLEST SERPL-MCNC: 106 MG/DL (ref 120–199)
CHOLEST/HDLC SERPL: 3.2 {RATIO} (ref 2–5)
CO2 SERPL-SCNC: 23 MMOL/L (ref 23–29)
CREAT SERPL-MCNC: 1.7 MG/DL (ref 0.5–1.4)
ERYTHROCYTE [DISTWIDTH] IN BLOOD BY AUTOMATED COUNT: 14.6 % (ref 11.5–14.5)
GFR SERPLBLD CREATININE-BSD FMLA CKD-EPI: 38 ML/MIN/1.73/M2
GLUCOSE SERPL-MCNC: 74 MG/DL (ref 70–110)
HCT VFR BLD AUTO: 37 % (ref 40–54)
HDLC SERPL-MCNC: 33 MG/DL (ref 40–75)
HDLC SERPL: 31.1 % (ref 20–50)
HGB BLD-MCNC: 11.8 GM/DL (ref 14–18)
IMM GRANULOCYTES # BLD AUTO: 0.02 K/UL (ref 0–0.04)
IMM GRANULOCYTES NFR BLD AUTO: 0.5 % (ref 0–0.5)
LDLC SERPL CALC-MCNC: 60.8 MG/DL (ref 63–159)
LYMPHOCYTES # BLD AUTO: 0.89 K/UL (ref 1–4.8)
MCH RBC QN AUTO: 30.5 PG (ref 27–50)
MCHC RBC AUTO-ENTMCNC: 31.9 G/DL (ref 32–36)
MCV RBC AUTO: 96 FL (ref 82–98)
NONHDLC SERPL-MCNC: 73 MG/DL
NUCLEATED RBC (/100WBC) (OHS): 0 /100 WBC
PLATELET # BLD AUTO: 258 K/UL (ref 150–450)
PMV BLD AUTO: 11.3 FL (ref 9.2–12.9)
POTASSIUM SERPL-SCNC: 5 MMOL/L (ref 3.5–5.1)
PROT SERPL-MCNC: 7.5 GM/DL (ref 6–8.4)
RBC # BLD AUTO: 3.87 M/UL (ref 4.6–6.2)
RELATIVE EOSINOPHIL (OHS): 1.4 %
RELATIVE LYMPHOCYTE (OHS): 20.5 % (ref 18–48)
RELATIVE MONOCYTE (OHS): 22.3 % (ref 4–15)
RELATIVE NEUTROPHIL (OHS): 55.1 % (ref 38–73)
SODIUM SERPL-SCNC: 140 MMOL/L (ref 136–145)
TRIGL SERPL-MCNC: 61 MG/DL (ref 30–150)
WBC # BLD AUTO: 4.35 K/UL (ref 3.9–12.7)

## 2025-03-27 PROCEDURE — 1159F MED LIST DOCD IN RCRD: CPT | Mod: HCNC,CPTII,S$GLB, | Performed by: STUDENT IN AN ORGANIZED HEALTH CARE EDUCATION/TRAINING PROGRAM

## 2025-03-27 PROCEDURE — 80061 LIPID PANEL: CPT | Mod: HCNC

## 2025-03-27 PROCEDURE — 85025 COMPLETE CBC W/AUTO DIFF WBC: CPT | Mod: HCNC

## 2025-03-27 PROCEDURE — 99999 PR PBB SHADOW E&M-EST. PATIENT-LVL IV: CPT | Mod: PBBFAC,HCNC,, | Performed by: STUDENT IN AN ORGANIZED HEALTH CARE EDUCATION/TRAINING PROGRAM

## 2025-03-27 PROCEDURE — 1160F RVW MEDS BY RX/DR IN RCRD: CPT | Mod: HCNC,CPTII,S$GLB, | Performed by: STUDENT IN AN ORGANIZED HEALTH CARE EDUCATION/TRAINING PROGRAM

## 2025-03-27 PROCEDURE — 80053 COMPREHEN METABOLIC PANEL: CPT | Mod: HCNC

## 2025-03-27 PROCEDURE — 36415 COLL VENOUS BLD VENIPUNCTURE: CPT | Mod: HCNC,PN

## 2025-03-27 PROCEDURE — 99214 OFFICE O/P EST MOD 30 MIN: CPT | Mod: HCNC,S$GLB,, | Performed by: STUDENT IN AN ORGANIZED HEALTH CARE EDUCATION/TRAINING PROGRAM

## 2025-03-27 PROCEDURE — 1126F AMNT PAIN NOTED NONE PRSNT: CPT | Mod: HCNC,CPTII,S$GLB, | Performed by: STUDENT IN AN ORGANIZED HEALTH CARE EDUCATION/TRAINING PROGRAM

## 2025-03-27 NOTE — PROGRESS NOTES
Office visit  Patient: Mr. Dwayne Saravia   3/27/2025       Assessment/Plan:       1. Chronic diastolic heart failure  -     CBC Auto Differential; Future; Expected date: 03/27/2025  -     HOSPITAL BED FOR HOME USE  -     Ambulatory referral/consult to Home Health; Future; Expected date: 03/28/2025        -currently compensated; continue to monitor    2. CKD stage G3b/A2, GFR 30-44 and albumin creatinine ratio  mg/g  -     Lipid Panel; Future; Expected date: 03/27/2025  -     Comprehensive Metabolic Panel; Future; Expected date: 03/27/2025        -continue to monitor; avoid nephrotoxins    3. Other emphysema  Overview:  Diffuse emphysema noted on imaging.  Hospitalized a few months ago concern for COPD exacerbation and he was started on home oxygen as well as inhalers.  After he returned home, he did not use the inhalers and has not been using his oxygen and says that he feels well with no shortness of breath and sees no need to use them.    Orders:  -     HOSPITAL BED FOR HOME USE    4. H/O: CVA (cerebrovascular accident)  Overview:  3/2008    Orders:  -     Ambulatory referral/consult to Home Health; Future; Expected date: 03/28/2025    5. Ataxic gait        -secondary to CVA; does have electric scooter ordered by PM&R    6. Polyneuropathy in diseases classified elsewhere  -stable, continue to monitor    7. Benign prostatic hyperplasia without lower urinary tract symptoms  Overview:  S/p suprapubic prostatectomy in 2/2012 by Dr. Granados      8. Abdominal aortic aneurysm (AAA) 3.0 cm to 5.5 cm in diameter in male  -     CBC Auto Differential; Future; Expected date: 03/27/2025        -will continue to monitor; seen on CTA in October 2023    9. Decreased mobility  -     HOSPITAL BED FOR HOME USE  -     Ambulatory referral/consult to Home Health; Future; Expected date: 03/28/2025        -will try to get a home health aid to come 2-3 times a week and help with ADLs      Return to clinic in 3 months or sooner as  "needed.        CHIEF COMPLAINT: follow up multiple medical problems    HPI: Dwayne Saravia is a 89 y.o. male who presents for follow up of multiple chronic medical conditions. He is accompanied by his wife.  He has no concerns today and states he's doing well.  Since his last visit with me, he had an appointment for a scooter evaluation with physical medicine and rehab.  He had home health but they stopped coming.  He has medications in blister packs, which helps.    He does have occasional shortness of breath, particularly with walking.  He denies any recent falls.    He reports he wakes up around 3 times a night to urinate.    His wife has multiple concerns. She reports that he needs a hospital bed.  He had home health for about 6 weeks and they helped his medications and took his blood pressure.        Current Outpatient Medications   Medication Instructions    aspirin 81 mg, Oral, Daily    carvediloL (COREG) 6.25 mg, Oral, 2 times daily    fluticasone furoate-vilanteroL (BREO ELLIPTA) 100-25 mcg/dose diskus inhaler 1 puff, Inhalation, Daily, Controller    ipratropium-albuteroL (COMBIVENT)  mcg/actuation inhaler 1 puff, Inhalation, Every 6 hours PRN, Rescue    levocetirizine (XYZAL) 5 mg, Oral, Nightly    losartan (COZAAR) 50 mg, Oral, Daily    NIFEdipine (PROCARDIA-XL) 60 mg, Oral, 2 times daily    omega-3 acid ethyl esters (LOVAZA) 2 g    simvastatin (ZOCOR) 40 mg, Oral, Daily    vitamin D (VITAMIN D3) 1,000 Units, Oral, Daily       Lab Results   Component Value Date    HGBA1C 5.7 (H) 10/24/2023    HGBA1C 6.1 (H) 08/28/2020     No results found for: "MICALBCREAT"  Lab Results   Component Value Date    LDLCALC 61.6 (L) 05/10/2021    LDLCALC 63.2 08/28/2020    CHOL 106 (L) 03/27/2025    HDL 33 (L) 03/27/2025    TRIG 61 03/27/2025       Lab Results   Component Value Date     03/27/2025    K 5.0 03/27/2025     03/27/2025    CO2 23 03/27/2025     09/13/2024    BUN 24 (H) 03/27/2025    " "CREATININE 1.7 (H) 03/27/2025    CALCIUM 8.7 03/27/2025    PROT 7.9 09/13/2024    ALBUMIN 3.1 (L) 03/27/2025    BILITOT 0.4 03/27/2025    ALKPHOS 103 03/27/2025    AST 19 03/27/2025    ALT 8 (L) 03/27/2025    ANIONGAP 7 (L) 03/27/2025    ESTGFRAFRICA 57.1 (A) 05/10/2021    EGFRNONAA 49.4 (A) 05/10/2021    WBC 4.35 03/27/2025    HGB 11.8 (L) 03/27/2025    HGB 13.5 (L) 09/13/2024    HCT 37.0 (L) 03/27/2025    MCV 96 03/27/2025     03/27/2025    TSH 1.124 04/10/2024    PSA 22 (H) 11/19/2008    HEPCAB Negative 10/23/2023       Lab Results   Component Value Date    PZEPDYNI41 698 11/20/2008    FERRITIN 462 (H) 11/20/2008    IRON 101 11/20/2008    TIBC 291 11/20/2008    FESATURATED 35 11/20/2008         Past Medical History:   Diagnosis Date    Arthritis     BPH (benign prostatic hyperplasia)     CVA (cerebral vascular accident) 2007    Hypertension      Past Surgical History:   Procedure Laterality Date    PROSTATE SURGERY      2016       Social History[1]    family history includes Cancer in his brother; Hypertension in his brother.    Vitals:    03/27/25 1010   BP: 108/86   Pulse: 62   Resp: 19   SpO2: (!) 93%   Weight: 70.3 kg (154 lb 15.7 oz)   Height: 5' 8" (1.727 m)   PainSc: 0-No pain       Body mass index is 23.57 kg/m².      Objective:   Physical Exam  Vitals reviewed.   Constitutional:       General: He is not in acute distress.     Appearance: Normal appearance. He is well-developed.   HENT:      Head: Normocephalic and atraumatic.      Right Ear: Hearing and external ear normal. No decreased hearing noted. No drainage or swelling.      Left Ear: Hearing and external ear normal. No decreased hearing noted. No drainage or swelling.      Nose: Nose normal. No rhinorrhea.      Mouth/Throat:      Mouth: Mucous membranes are moist.   Eyes:      General: Lids are normal.         Right eye: No discharge.         Left eye: No discharge.      Conjunctiva/sclera: Conjunctivae normal.      Right eye: Right " conjunctiva is not injected. No exudate.     Left eye: Left conjunctiva is not injected. No exudate.  Cardiovascular:      Rate and Rhythm: Normal rate and regular rhythm.      Heart sounds: Normal heart sounds. No murmur heard.     No friction rub. No gallop.   Pulmonary:      Effort: Pulmonary effort is normal. No respiratory distress.      Breath sounds: No stridor. No wheezing, rhonchi or rales.   Musculoskeletal:         General: No deformity.      Right lower leg: No edema.      Left lower leg: No edema.   Skin:     General: Skin is warm and dry.   Neurological:      Mental Status: He is alert and oriented to person, place, and time.      Sensory: Sensory deficit (hands) present.      Motor: Weakness (left side) present.   Psychiatric:         Mood and Affect: Mood normal.         Speech: Speech normal.         Behavior: Behavior normal.         Thought Content: Thought content normal.             Frieda Galvan MD  Internal Medicine and Pediatrics                                 [1]   Social History  Tobacco Use    Smoking status: Former     Current packs/day: 0.00     Types: Cigarettes     Start date: 1950     Quit date: 2018     Years since quittin.1     Passive exposure: Past    Smokeless tobacco: Never   Substance Use Topics    Alcohol use: No    Drug use: No

## 2025-03-28 ENCOUNTER — RESULTS FOLLOW-UP (OUTPATIENT)
Dept: PRIMARY CARE CLINIC | Facility: CLINIC | Age: OVER 89
End: 2025-03-28
Payer: MEDICARE

## 2025-03-31 ENCOUNTER — TELEPHONE (OUTPATIENT)
Dept: PRIMARY CARE CLINIC | Facility: CLINIC | Age: OVER 89
End: 2025-03-31
Payer: MEDICARE

## 2025-04-16 ENCOUNTER — OUTPATIENT CASE MANAGEMENT (OUTPATIENT)
Dept: ADMINISTRATIVE | Facility: OTHER | Age: OVER 89
End: 2025-04-16
Payer: MEDICARE

## 2025-04-17 NOTE — PROGRESS NOTES
Outpatient Care Management   - Patient Assessment    Patient: Dwayne Saravia  MRN:  [id number]  Date of Service:  4/17/2025  Completed by:  Libertad Galdamez LCSW  Referral Date: 09/16/2024    Reason for Visit   Patient presents with    Social Work Assessment     4/16/2025       Brief Summary:  received a referral from self-referral for the following Low/Mod SW psychosocial needs: ramp resources and caregiver support. Care plan was created in collaboration with patient/caregiver input. Received call from patient's spouse, Charley. Spoke with patient who provided consent for SW to continue speaking with Charley regarding his care. Patient does report he is grieving the very recent loss of his sister who was buried a few days ago. Charley states their  has been coming over regularly to pray and provide comfort to patient; patient attends [employer] every week. SW will continue to follow up with patient regarding his grieving process and offer additional resources if patient is agreeable. Charley states they have been receiving utility help from [employer]; states she also checked with [facility], but they were out of funds to assist with utilities. Charley states their main issue right now is their broken elevator to help patient up/down the stairs to the home; reports patient has a scooter that they cannot bring outside due to the 6 steps. Charley reports she has tried [employer], but they are not accepting applications at this time. CHRISTOPHER informed of the possibility of help from [employer] and provided ph#. Also discussed private contractors, Charley is unsure if they would be able to afford that. Charley reports she is 80 years old and feels that in the future she may need help with caring for patient. CHRISTOPHER educated regarding the state waiver programs and Charley prefers for SW to mail the information to review. Charley states her voicemail does not always work and inquired if SW would be able to text if unable  to get in contact with her; informed that while SW cannot text, if patient sets up his [facility] My[facility], SW can send messages through the portal. SW sent activation email to patient, Charley states she will help patient set it up. Charley states they have been utilizing a local food pantry at [facility] [employer]; denies any urgent financial strain due to receiving community resources and transportation issues at this time. Charley is agreeable for CHRISTOPHER to follow up in 1 week.

## 2025-04-23 ENCOUNTER — OUTPATIENT CASE MANAGEMENT (OUTPATIENT)
Dept: ADMINISTRATIVE | Facility: OTHER | Age: OVER 89
End: 2025-04-23
Payer: MEDICARE

## 2025-04-23 NOTE — LETTER
Dwayne Saravia  [address] [address]      Dear Dwayne Saravia,     I am writing from the Outpatient Care Management Department at [facility]. I have been unsuccessful at reaching you since we spoke on Wednesday 4/16/2025. I hope you found the assistance previously provided to you helpful.     Please contact me at [phone] from 8:00AM to 4:30 PM on Monday thru Friday.     As you know, [facility] also has a program with a nurse available 24/7 to answer questions or provide medical advice. [facility] can be reached at [phone].    Sincerely,       Libertad Galdamez LCSW  Outpatient Care Management

## 2025-04-23 NOTE — LETTER
Dwayne Saravia  [address] [address]      Dear Dwayne Saravia,     I am writing from the Outpatient Care Management Department at [facility]. I have been unsuccessful at reaching you since we spoke on Wednesday 4/16/2025. I hope you found the assistance previously provided to you helpful.     Please contact me at [phone] from 8:00AM to 4:30 PM on Monday through Friday.     As you know, [facility] also has a program with a nurse available 24/7 to answer questions or provide medical advice. [facility] can be reached at [phone].    Sincerely,       Libertad Galdamez LCSW  Outpatient Care Management

## 2025-04-23 NOTE — PROGRESS NOTES
5/7/2025  3rd and final attempt to complete Follow-Up for [facility], left message.    4/30/2025  2nd attempt to complete Follow-Up for [facility], left message. Attempt letter sent.    4/23/2025  1st attempt to complete Follow-Up for [facility], left message. Attempt letter sent.

## 2025-05-07 DIAGNOSIS — I10 PRIMARY HYPERTENSION: ICD-10-CM

## 2025-05-07 DIAGNOSIS — M81.0 AGE-RELATED OSTEOPOROSIS WITHOUT CURRENT PATHOLOGICAL FRACTURE: ICD-10-CM

## 2025-05-07 DIAGNOSIS — I69.30 HISTORY OF STROKE WITH RESIDUAL DEFICIT: ICD-10-CM

## 2025-05-07 RX ORDER — NIFEDIPINE 60 MG/1
60 TABLET, EXTENDED RELEASE ORAL 2 TIMES DAILY
Qty: 180 TABLET | Refills: 3 | Status: SHIPPED | OUTPATIENT
Start: 2025-05-07

## 2025-05-07 NOTE — TELEPHONE ENCOUNTER
Care Due:                  Date            Visit Type   Department     Provider  --------------------------------------------------------------------------------                                EP -                              PRIMARY      [facility] PR[facility]  Last Visit: 03-      CARE ([facility])   NIRANJAN Galvan  Next Visit: None Scheduled  None         None Found                                                            Last  Test          Frequency    Reason                     Performed    Due Date  --------------------------------------------------------------------------------    Vitamin D...  12 months..  vitamin..................  Not Found    Overdue    [employer] Embedded Care Due Messages. Reference number: [id number].   5/07/2025 5:17:28 PM CDT

## 2025-05-07 NOTE — TELEPHONE ENCOUNTER
Refill Decision Note   Dwayne Saravia  is requesting a refill authorization.  Brief Assessment and Rationale for Refill:  Approve     Medication Therapy Plan:         Comments:     Note composed:5:41 PM 05/07/2025             Appointments     Last Visit   3/27/2025 Frieda Galvan MD   Next Visit   Visit date not found Frieda Galvan MD

## 2025-05-07 NOTE — TELEPHONE ENCOUNTER
No care due was identified.  [facility] Embedded Care Due Messages. Reference number: [id number].   5/07/2025 5:17:59 PM CDT

## 2025-05-07 NOTE — TELEPHONE ENCOUNTER
Refill Routing Note   Medication(s) are not appropriate for processing by [facility] for the following reason(s):        Outside of protocol  No active prescription written by provider    [facility] action(s):  Route     Requires labs : Yes             Appointments  past 12m or future 3m with PCP    Date Provider   Last Visit   3/27/2025 Frieda Galvan MD   Next Visit   5/7/2025 Frieda Galvan MD   ED visits in past 90 days: 0        Note composed:5:40 PM 05/07/2025

## 2025-05-08 RX ORDER — CHOLECALCIFEROL (VITAMIN D3) 25 MCG
1000 TABLET ORAL DAILY
Qty: 90 TABLET | Refills: 0 | Status: SHIPPED | OUTPATIENT
Start: 2025-05-08

## 2025-05-08 RX ORDER — NAPROXEN SODIUM 220 MG/1
81 TABLET, FILM COATED ORAL DAILY
Qty: 90 TABLET | Refills: 0 | Status: SHIPPED | OUTPATIENT
Start: 2025-05-08

## 2025-06-06 ENCOUNTER — TELEPHONE (OUTPATIENT)
Dept: PRIMARY CARE CLINIC | Facility: CLINIC | Age: OVER 89
End: 2025-06-06
Payer: MEDICARE

## 2025-06-06 NOTE — TELEPHONE ENCOUNTER
I called and sw pt spouse to schedule their appt with respective PCPs. Appt confirmed for 06/12/25 w Dr. Galvan and wife w PCP Dr. Colón in July.

## 2025-06-13 ENCOUNTER — TELEPHONE (OUTPATIENT)
Dept: PRIMARY CARE CLINIC | Facility: CLINIC | Age: OVER 89
End: 2025-06-13
Payer: MEDICARE

## 2025-06-13 NOTE — TELEPHONE ENCOUNTER
Copied from CRM #[id number]. Topic: Appointments - Appointment Rescheduling  >> Jun 12, 2025  5:08 PM Med Assistant Becka wrote:  Type:  Sooner Apoointment Request     Caller is requesting a sooner appointment.  Caller declined first available appointment listed below.  Caller will not accept being placed on the waitlist and is requesting a message be sent to doctor.  Name of Caller:pt   When is the first available appointment?07.02   Would the patient rather a call back or a response via [facility]? Call   Best Call Back Number: [phone]  Additional Information: Pt would like morning apt.

## 2025-07-10 ENCOUNTER — OFFICE VISIT (OUTPATIENT)
Dept: PRIMARY CARE CLINIC | Facility: CLINIC | Age: OVER 89
End: 2025-07-10
Payer: MEDICARE

## 2025-07-10 ENCOUNTER — HOSPITAL ENCOUNTER (OUTPATIENT)
Dept: RADIOLOGY | Facility: HOSPITAL | Age: OVER 89
Discharge: HOME | End: 2025-07-10
Attending: STUDENT IN AN ORGANIZED HEALTH CARE EDUCATION/TRAINING PROGRAM
Payer: MEDICARE

## 2025-07-10 VITALS
DIASTOLIC BLOOD PRESSURE: 76 MMHG | WEIGHT: 154.56 LBS | BODY MASS INDEX: 23.43 KG/M2 | HEART RATE: 65 BPM | HEIGHT: 68 IN | SYSTOLIC BLOOD PRESSURE: 130 MMHG | OXYGEN SATURATION: 91 %

## 2025-07-10 DIAGNOSIS — R13.10 DYSPHAGIA, UNSPECIFIED TYPE: ICD-10-CM

## 2025-07-10 DIAGNOSIS — K21.9 GASTROESOPHAGEAL REFLUX DISEASE WITHOUT ESOPHAGITIS: ICD-10-CM

## 2025-07-10 DIAGNOSIS — I69.354 HEMIPLEGIA AND HEMIPARESIS FOLLOWING CEREBRAL INFARCTION AFFECTING LEFT NON-DOMINANT SIDE: ICD-10-CM

## 2025-07-10 DIAGNOSIS — I42.9 CARDIOMYOPATHY, UNSPECIFIED TYPE: ICD-10-CM

## 2025-07-10 DIAGNOSIS — I10 ESSENTIAL HYPERTENSION: Primary | ICD-10-CM

## 2025-07-10 DIAGNOSIS — R05.9 COUGH, UNSPECIFIED TYPE: ICD-10-CM

## 2025-07-10 DIAGNOSIS — J43.8 OTHER EMPHYSEMA: ICD-10-CM

## 2025-07-10 DIAGNOSIS — I50.30 HEART FAILURE WITH PRESERVED EJECTION FRACTION, UNSPECIFIED HF CHRONICITY: ICD-10-CM

## 2025-07-10 PROCEDURE — 71046 X-RAY EXAM CHEST 2 VIEWS: CPT | Mod: 26,,, | Performed by: RADIOLOGY

## 2025-07-10 PROCEDURE — 1159F MED LIST DOCD IN RCRD: CPT | Mod: CPTII,S$GLB,, | Performed by: STUDENT IN AN ORGANIZED HEALTH CARE EDUCATION/TRAINING PROGRAM

## 2025-07-10 PROCEDURE — 71046 X-RAY EXAM CHEST 2 VIEWS: CPT | Mod: TC,PN

## 2025-07-10 PROCEDURE — 99214 OFFICE O/P EST MOD 30 MIN: CPT | Mod: S$GLB,,, | Performed by: STUDENT IN AN ORGANIZED HEALTH CARE EDUCATION/TRAINING PROGRAM

## 2025-07-10 PROCEDURE — 1160F RVW MEDS BY RX/DR IN RCRD: CPT | Mod: CPTII,S$GLB,, | Performed by: STUDENT IN AN ORGANIZED HEALTH CARE EDUCATION/TRAINING PROGRAM

## 2025-07-10 PROCEDURE — 99999 PR PBB SHADOW E&M-EST. PATIENT-LVL V: CPT | Mod: PBBFAC,,, | Performed by: STUDENT IN AN ORGANIZED HEALTH CARE EDUCATION/TRAINING PROGRAM

## 2025-07-10 PROCEDURE — 1101F PT FALLS ASSESS-DOCD LE1/YR: CPT | Mod: CPTII,S$GLB,, | Performed by: STUDENT IN AN ORGANIZED HEALTH CARE EDUCATION/TRAINING PROGRAM

## 2025-07-10 PROCEDURE — 1126F AMNT PAIN NOTED NONE PRSNT: CPT | Mod: CPTII,S$GLB,, | Performed by: STUDENT IN AN ORGANIZED HEALTH CARE EDUCATION/TRAINING PROGRAM

## 2025-07-10 PROCEDURE — 3288F FALL RISK ASSESSMENT DOCD: CPT | Mod: CPTII,S$GLB,, | Performed by: STUDENT IN AN ORGANIZED HEALTH CARE EDUCATION/TRAINING PROGRAM

## 2025-07-10 RX ORDER — AMMONIUM LACTATE 12 G/100G
LOTION TOPICAL
COMMUNITY
Start: 2025-06-17

## 2025-07-10 RX ORDER — MUPIROCIN 20 MG/G
OINTMENT TOPICAL
COMMUNITY
Start: 2025-06-17

## 2025-07-10 NOTE — PATIENT INSTRUCTIONS
[facility] at [phone]   [place] Medicaid Community Choices Waiver   Ask about ramps or stair lifts Home Modifications - i.e., wheelchair ramps, stair lifts, roll-in showers, pedestal sinks   They also have people who can help with ADL's - bathing, dressing, etc

## 2025-07-10 NOTE — PROGRESS NOTES
Office visit  Patient: Mr. Dwayne Saravia   7/10/2025       Assessment/Plan:       1. Essential hypertension  -     TSH; Future; Expected date: 07/10/2025        -stable, continue current medication    2. Other emphysema  Overview:  Diffuse emphysema noted on imaging.  Was recommended for home oxygen at one point for hypoxia with exertion, but he declined    Orders:  -     TSH; Future; Expected date: 07/10/2025  -     Comprehensive Metabolic Panel; Future; Expected date: 07/10/2025  -     Cancel: CBC Auto Differential; Future; Expected date: 07/10/2025  -     Ambulatory referral/consult to Outpatient Case Management    3. Hemiplegia and hemiparesis following cerebral infarction affecting left non-dominant side  Overview:  Stable, continue to monitor  Needs assistance at home - wife is full time caregiver, which is difficult for one person  Consult case management    Orders:  -     TSH; Future; Expected date: 07/10/2025  -     Cancel: Lipid Panel; Future; Expected date: 07/10/2025  -     Comprehensive Metabolic Panel; Future; Expected date: 07/10/2025  -     Cancel: CBC Auto Differential; Future; Expected date: 07/10/2025  -     Ambulatory referral/consult to Outpatient Case Management    4. Gastroesophageal reflux disease without esophagitis  -     TSH; Future; Expected date: 07/10/2025  -     Comprehensive Metabolic Panel; Future; Expected date: 07/10/2025  -     Cancel: CBC Auto Differential; Future; Expected date: 07/10/2025  -     Ambulatory referral/consult to Outpatient Case Management        -stable, continue to monitor    5. Cardiomyopathy, unspecified type  -     TSH; Future; Expected date: 07/10/2025        -continue to monitor    6. Dysphagia, unspecified type  -     Ambulatory Referral/Consult to Speech Therapy    7. Heart failure with preserved ejection fraction, unspecified HF chronicity       -stable, continue current medication    8. Cough, unspecified type  -     X-Ray Chest PA And Lateral; Future;  "Expected date: 07/10/2025      Return to clinic in 3 months or sooner as needed.          CHIEF COMPLAINT: follow up     HPI: Dwayne Saravia is a 89 y.o. male who presents for follow up.  He reports that he is feeling good.  He has no concerns.  He is accompanied by his wife.  She is concerned because she needs help caring for him.  It is difficult for her to care for herself and him.  She is working as an [employer]  to make [employer] up.  She spoke with an insurance lady who stated that his wife could become his official caregiver in order to get some financial assistance.  She has to shave patient, dress patient, changes his incontinence pads when  he has an accident.  He is incontinent of bowel and bladder.    He received a motorized scooter at home about 2 months ago.    ROS    Current Outpatient Medications   Medication Instructions    ammonium lactate (LAC-HYDRIN) 12 % lotion Apply topically.    aspirin 81 mg, Oral, Daily    carvediloL (COREG) 6.25 mg, Oral, 2 times daily    fluticasone furoate-vilanteroL (BREO ELLIPTA) 100-25 mcg/dose diskus inhaler 1 puff, Inhalation, Daily, Controller    ipratropium-albuteroL (COMBIVENT)  mcg/actuation inhaler 1 puff, Inhalation, Every 6 hours PRN, Rescue    levocetirizine (XYZAL) 5 mg, Oral, Nightly    losartan (COZAAR) 50 mg, Oral, Daily    mupirocin (BACTROBAN) 2 % ointment Apply topically.    NIFEdipine (PROCARDIA-XL) 60 mg, Oral, 2 times daily    omega-3 acid ethyl esters (LOVAZA) 2 g    simvastatin (ZOCOR) 40 mg, Oral, Daily    vitamin D (VITAMIN D3) 1,000 Units, Oral, Daily       Lab Results   Component Value Date    HGBA1C 5.7 (H) 10/24/2023    HGBA1C 6.1 (H) 08/28/2020     No results found for: "MICALBCREAT"  Lab Results   Component Value Date    LDLCALC 60.8 (L) 03/27/2025    LDLCALC 61.6 (L) 05/10/2021    CHOL 106 (L) 03/27/2025    HDL 33 (L) 03/27/2025    TRIG 61 03/27/2025       Lab Results   Component Value Date     07/10/2025    K 4.6 07/10/2025    " " (H) 07/10/2025    CO2 21 (L) 07/10/2025     07/10/2025    BUN 20 07/10/2025    CREATININE 1.8 (H) 07/10/2025    CALCIUM 8.3 (L) 07/10/2025    PROT 7.3 07/10/2025    ALBUMIN 3.4 (L) 07/10/2025    BILITOT 0.3 07/10/2025    ALKPHOS 115 07/10/2025    AST 13 07/10/2025    ALT 9 (L) 07/10/2025    ANIONGAP 8 07/10/2025    ESTGFRAFRICA 57.1 (A) 05/10/2021    EGFRNONAA 49.4 (A) 05/10/2021    WBC 4.35 03/27/2025    HGB 11.8 (L) 03/27/2025    HGB 13.5 (L) 09/13/2024    HCT 37.0 (L) 03/27/2025    MCV 96 03/27/2025     03/27/2025    TSH 2.201 07/10/2025    PSA 22 (H) 11/19/2008    HEPCAB Negative 10/23/2023       Lab Results   Component Value Date    [id number] 698 11/20/2008    FERRITIN 462 (H) 11/20/2008    IRON 101 11/20/2008    TIBC 291 11/20/2008    FESATURATED 35 11/20/2008         Past Medical History:   Diagnosis Date    Arthritis     BPH (benign prostatic hyperplasia)     CVA (cerebral vascular accident) 2007    Hypertension      Past Surgical History:   Procedure Laterality Date    PROSTATE SURGERY      2016       Social History[1]    family history includes Cancer in his brother; Hypertension in his brother.    Vitals:    07/10/25 1033   BP: 130/76   Pulse: 65   SpO2: (!) 91%   Weight: 70.1 kg (154 lb 8.7 oz)   Height: 5' 8" (1.727 m)   PainSc: 0-No pain       Body mass index is 23.5 kg/m².      Objective:   Physical Exam  Vitals reviewed.   Constitutional:       General: He is not in acute distress.     Appearance: He is well-developed.      Comments: Thin, frail-appearing   HENT:      Head: Normocephalic and atraumatic.      Right Ear: Hearing and external ear normal. No decreased hearing noted. No drainage or swelling.      Left Ear: Hearing and external ear normal. No decreased hearing noted. No drainage or swelling.      Nose: Nose normal. No rhinorrhea.      Mouth/Throat:      Mouth: Mucous membranes are moist.   Eyes:      General: Lids are normal.         Right eye: No discharge.         " Left eye: No discharge.      Conjunctiva/sclera: Conjunctivae normal.      Right eye: Right conjunctiva is not injected. No exudate.     Left eye: Left conjunctiva is not injected. No exudate.  Cardiovascular:      Rate and Rhythm: Normal rate and regular rhythm.      Heart sounds: Normal heart sounds. No murmur heard.     No friction rub. No gallop.   Pulmonary:      Effort: Pulmonary effort is normal. No respiratory distress.      Breath sounds: No stridor. Rales (heard in left lower lung field) present. No wheezing or rhonchi.   Musculoskeletal:         General: No deformity.      Right lower leg: No edema.      Left lower leg: No edema.   Skin:     General: Skin is warm and dry.   Neurological:      General: No focal deficit present.      Mental Status: He is alert and oriented to person, place, and time.   Psychiatric:         Mood and Affect: Mood normal.         Speech: Speech normal.         Behavior: Behavior normal.           Frieda Galvan MD  Internal Medicine and Pediatrics                                 [1]   Social History  Tobacco Use    Smoking status: Former     Current packs/day: 0.00     Types: Cigarettes     Start date: 1950     Quit date: 2018     Years since quittin.4     Passive exposure: Past    Smokeless tobacco: Never   Substance Use Topics    Alcohol use: No    Drug use: No

## 2025-07-11 ENCOUNTER — TELEPHONE (OUTPATIENT)
Dept: PRIMARY CARE CLINIC | Facility: CLINIC | Age: OVER 89
End: 2025-07-11
Payer: MEDICARE

## 2025-07-11 NOTE — TELEPHONE ENCOUNTER
----- Message from Frieda Galvan MD sent at 7/11/2025  8:32 AM CDT -----  Please let patient know that his chest x-ray was normal.  His labs show decreased kidney function, but it is relatively stable. We should recheck this in 3 months.  His thyroid function is normal.  ----- Message -----  From: Jackie, Rad Results In  Sent: 7/10/2025  12:34 PM CDT  To: Frieda Galvan MD

## 2025-07-22 ENCOUNTER — PATIENT OUTREACH (OUTPATIENT)
Dept: ADMINISTRATIVE | Facility: OTHER | Age: OVER 89
End: 2025-07-22
Payer: MEDICARE

## 2025-07-25 NOTE — PROGRESS NOTES
CHW - Initial Contact    Ibis Pham, Community Health Worker completed the Social Determinant of Health questionnaire with patient and his wife Charley Saravia via telephone today.    Pt identified barriers of most importance are: Mrs. Saravia stated that at this time they do not need any community resources.  Mr. Saravia receives SNAP benefits and they receive food from [employer] twice a month.  Mrs. Saravia said that the [facility] and [employer] paid their utility bill. CHW faxed referral to [facility] for case management.   Referrals to community agencies completed with patient/caregiver consent outside of [facility] include: None   Referrals were put through [facility] - no:   Other information discussed the patient needs / wants help with: None at this time.    Case Closure - Due: 7/25/2025

## 2025-08-25 ENCOUNTER — PATIENT OUTREACH (OUTPATIENT)
Dept: ADMINISTRATIVE | Facility: OTHER | Age: OVER 89
End: 2025-08-25
Payer: MEDICARE

## 2025-09-02 ENCOUNTER — TELEPHONE (OUTPATIENT)
Dept: PRIMARY CARE CLINIC | Facility: CLINIC | Age: OVER 89
End: 2025-09-02
Payer: MEDICARE

## 2025-09-02 DIAGNOSIS — F03.C0 SEVERE DEMENTIA WITHOUT BEHAVIORAL DISTURBANCE, PSYCHOTIC DISTURBANCE, MOOD DISTURBANCE, OR ANXIETY, UNSPECIFIED DEMENTIA TYPE: Primary | ICD-10-CM
